# Patient Record
Sex: MALE | Race: WHITE | NOT HISPANIC OR LATINO | Employment: OTHER | URBAN - METROPOLITAN AREA
[De-identification: names, ages, dates, MRNs, and addresses within clinical notes are randomized per-mention and may not be internally consistent; named-entity substitution may affect disease eponyms.]

---

## 2023-06-26 ENCOUNTER — OFFICE VISIT (OUTPATIENT)
Dept: PRIMARY CARE | Facility: CLINIC | Age: 73
End: 2023-06-26
Payer: MEDICARE

## 2023-06-26 VITALS
HEIGHT: 77 IN | OXYGEN SATURATION: 98 % | BODY MASS INDEX: 29.4 KG/M2 | SYSTOLIC BLOOD PRESSURE: 129 MMHG | DIASTOLIC BLOOD PRESSURE: 76 MMHG | WEIGHT: 249 LBS | HEART RATE: 73 BPM | RESPIRATION RATE: 16 BRPM

## 2023-06-26 DIAGNOSIS — E78.49 OTHER HYPERLIPIDEMIA: Chronic | ICD-10-CM

## 2023-06-26 DIAGNOSIS — Z00.00 MEDICARE ANNUAL WELLNESS VISIT, SUBSEQUENT: Primary | ICD-10-CM

## 2023-06-26 DIAGNOSIS — R97.20 PSA ELEVATION: ICD-10-CM

## 2023-06-26 DIAGNOSIS — E03.9 ACQUIRED HYPOTHYROIDISM: Chronic | ICD-10-CM

## 2023-06-26 DIAGNOSIS — E11.9 CONTROLLED TYPE 2 DIABETES MELLITUS WITHOUT COMPLICATION, WITHOUT LONG-TERM CURRENT USE OF INSULIN (MULTI): Chronic | ICD-10-CM

## 2023-06-26 DIAGNOSIS — Z23 NEED FOR PROPHYLACTIC VACCINATION AGAINST STREPTOCOCCUS PNEUMONIAE (PNEUMOCOCCUS): ICD-10-CM

## 2023-06-26 DIAGNOSIS — I48.0 AF (PAROXYSMAL ATRIAL FIBRILLATION) (MULTI): Chronic | ICD-10-CM

## 2023-06-26 DIAGNOSIS — J30.2 SEASONAL ALLERGIES: ICD-10-CM

## 2023-06-26 PROBLEM — E78.5 HYPERLIPIDEMIA: Status: ACTIVE | Noted: 2023-06-26

## 2023-06-26 PROBLEM — E78.5 HYPERLIPIDEMIA: Chronic | Status: ACTIVE | Noted: 2023-06-26

## 2023-06-26 LAB
ALANINE AMINOTRANSFERASE (SGPT) (U/L) IN SER/PLAS: 17 U/L (ref 10–52)
ALBUMIN (G/DL) IN SER/PLAS: 4 G/DL (ref 3.4–5)
ALBUMIN (MG/L) IN URINE: 12.8 MG/L
ALBUMIN/CREATININE (UG/MG) IN URINE: 14.4 UG/MG CRT (ref 0–30)
ALKALINE PHOSPHATASE (U/L) IN SER/PLAS: 87 U/L (ref 33–136)
ANION GAP IN SER/PLAS: 11 MMOL/L (ref 10–20)
ANION GAP IN SER/PLAS: 9 MMOL/L (ref 10–20)
ASPARTATE AMINOTRANSFERASE (SGOT) (U/L) IN SER/PLAS: 14 U/L (ref 9–39)
BILIRUBIN TOTAL (MG/DL) IN SER/PLAS: 0.8 MG/DL (ref 0–1.2)
CALCIUM (MG/DL) IN SER/PLAS: 8.7 MG/DL (ref 8.6–10.3)
CALCIUM (MG/DL) IN SER/PLAS: 8.8 MG/DL (ref 8.6–10.3)
CARBON DIOXIDE, TOTAL (MMOL/L) IN SER/PLAS: 27 MMOL/L (ref 21–32)
CARBON DIOXIDE, TOTAL (MMOL/L) IN SER/PLAS: 28 MMOL/L (ref 21–32)
CHLORIDE (MMOL/L) IN SER/PLAS: 107 MMOL/L (ref 98–107)
CHLORIDE (MMOL/L) IN SER/PLAS: 107 MMOL/L (ref 98–107)
CHOLESTEROL (MG/DL) IN SER/PLAS: 115 MG/DL (ref 0–199)
CHOLESTEROL (MG/DL) IN SER/PLAS: 116 MG/DL (ref 0–199)
CHOLESTEROL IN HDL (MG/DL) IN SER/PLAS: 32.3 MG/DL
CHOLESTEROL IN HDL (MG/DL) IN SER/PLAS: 32.8 MG/DL
CHOLESTEROL/HDL RATIO: 3.5
CHOLESTEROL/HDL RATIO: 3.6
CREATININE (MG/DL) IN SER/PLAS: 1.01 MG/DL (ref 0.5–1.3)
CREATININE (MG/DL) IN SER/PLAS: 1.06 MG/DL (ref 0.5–1.3)
CREATININE (MG/DL) IN URINE: 88.7 MG/DL (ref 20–370)
ESTIMATED AVERAGE GLUCOSE FOR HBA1C: 189 MG/DL
ESTIMATED AVERAGE GLUCOSE FOR HBA1C: 192 MG/DL
GFR MALE: 73 ML/MIN/1.73M2
GFR MALE: 77 ML/MIN/1.73M2
GLUCOSE (MG/DL) IN SER/PLAS: 139 MG/DL (ref 74–99)
GLUCOSE (MG/DL) IN SER/PLAS: 140 MG/DL (ref 74–99)
HEMOGLOBIN A1C/HEMOGLOBIN TOTAL IN BLOOD: 8.2 %
HEMOGLOBIN A1C/HEMOGLOBIN TOTAL IN BLOOD: 8.3 %
LDL: 42 MG/DL (ref 0–99)
LDL: 43 MG/DL (ref 0–99)
NON HDL CHOLESTEROL: 83 MG/DL
NON HDL CHOLESTEROL: 83 MG/DL
POTASSIUM (MMOL/L) IN SER/PLAS: 4 MMOL/L (ref 3.5–5.3)
POTASSIUM (MMOL/L) IN SER/PLAS: 4 MMOL/L (ref 3.5–5.3)
PROTEIN TOTAL: 6.4 G/DL (ref 6.4–8.2)
SODIUM (MMOL/L) IN SER/PLAS: 140 MMOL/L (ref 136–145)
SODIUM (MMOL/L) IN SER/PLAS: 141 MMOL/L (ref 136–145)
THYROTROPIN (MIU/L) IN SER/PLAS BY DETECTION LIMIT <= 0.05 MIU/L: 1.8 MIU/L (ref 0.44–3.98)
TRIGLYCERIDE (MG/DL) IN SER/PLAS: 203 MG/DL (ref 0–149)
TRIGLYCERIDE (MG/DL) IN SER/PLAS: 204 MG/DL (ref 0–149)
UREA NITROGEN (MG/DL) IN SER/PLAS: 13 MG/DL (ref 6–23)
UREA NITROGEN (MG/DL) IN SER/PLAS: 14 MG/DL (ref 6–23)
VLDL: 41 MG/DL (ref 0–40)
VLDL: 41 MG/DL (ref 0–40)

## 2023-06-26 PROCEDURE — 1160F RVW MEDS BY RX/DR IN RCRD: CPT | Performed by: STUDENT IN AN ORGANIZED HEALTH CARE EDUCATION/TRAINING PROGRAM

## 2023-06-26 PROCEDURE — 3074F SYST BP LT 130 MM HG: CPT | Performed by: STUDENT IN AN ORGANIZED HEALTH CARE EDUCATION/TRAINING PROGRAM

## 2023-06-26 PROCEDURE — G0439 PPPS, SUBSEQ VISIT: HCPCS | Performed by: STUDENT IN AN ORGANIZED HEALTH CARE EDUCATION/TRAINING PROGRAM

## 2023-06-26 PROCEDURE — 1170F FXNL STATUS ASSESSED: CPT | Performed by: STUDENT IN AN ORGANIZED HEALTH CARE EDUCATION/TRAINING PROGRAM

## 2023-06-26 PROCEDURE — G0009 ADMIN PNEUMOCOCCAL VACCINE: HCPCS | Performed by: STUDENT IN AN ORGANIZED HEALTH CARE EDUCATION/TRAINING PROGRAM

## 2023-06-26 PROCEDURE — 1036F TOBACCO NON-USER: CPT | Performed by: STUDENT IN AN ORGANIZED HEALTH CARE EDUCATION/TRAINING PROGRAM

## 2023-06-26 PROCEDURE — 1159F MED LIST DOCD IN RCRD: CPT | Performed by: STUDENT IN AN ORGANIZED HEALTH CARE EDUCATION/TRAINING PROGRAM

## 2023-06-26 PROCEDURE — 3078F DIAST BP <80 MM HG: CPT | Performed by: STUDENT IN AN ORGANIZED HEALTH CARE EDUCATION/TRAINING PROGRAM

## 2023-06-26 PROCEDURE — 99214 OFFICE O/P EST MOD 30 MIN: CPT | Performed by: STUDENT IN AN ORGANIZED HEALTH CARE EDUCATION/TRAINING PROGRAM

## 2023-06-26 PROCEDURE — 90677 PCV20 VACCINE IM: CPT | Performed by: STUDENT IN AN ORGANIZED HEALTH CARE EDUCATION/TRAINING PROGRAM

## 2023-06-26 PROCEDURE — 99397 PER PM REEVAL EST PAT 65+ YR: CPT | Performed by: STUDENT IN AN ORGANIZED HEALTH CARE EDUCATION/TRAINING PROGRAM

## 2023-06-26 PROCEDURE — 4010F ACE/ARB THERAPY RXD/TAKEN: CPT | Performed by: STUDENT IN AN ORGANIZED HEALTH CARE EDUCATION/TRAINING PROGRAM

## 2023-06-26 RX ORDER — EMPAGLIFLOZIN 10 MG/1
10 TABLET, FILM COATED ORAL DAILY
COMMUNITY
End: 2023-12-04 | Stop reason: ALTCHOICE

## 2023-06-26 RX ORDER — SOTALOL HYDROCHLORIDE 120 MG/1
TABLET ORAL 2 TIMES DAILY
COMMUNITY
End: 2023-08-11 | Stop reason: SDUPTHER

## 2023-06-26 RX ORDER — ATORVASTATIN CALCIUM 40 MG/1
40 TABLET, FILM COATED ORAL NIGHTLY
COMMUNITY
End: 2024-03-08

## 2023-06-26 RX ORDER — INSULIN GLARGINE 100 [IU]/ML
INJECTION, SOLUTION SUBCUTANEOUS
COMMUNITY
End: 2024-02-19

## 2023-06-26 RX ORDER — GLIPIZIDE 10 MG/1
10 TABLET ORAL 2 TIMES DAILY
COMMUNITY
End: 2024-02-19

## 2023-06-26 RX ORDER — METFORMIN HYDROCHLORIDE 750 MG/1
750 TABLET, EXTENDED RELEASE ORAL 2 TIMES DAILY
COMMUNITY
End: 2023-11-29

## 2023-06-26 RX ORDER — LISINOPRIL 2.5 MG/1
2.5 TABLET ORAL DAILY
COMMUNITY
Start: 2023-04-21 | End: 2023-10-25 | Stop reason: SDUPTHER

## 2023-06-26 RX ORDER — LEVOTHYROXINE SODIUM 150 UG/1
150 TABLET ORAL DAILY
COMMUNITY
End: 2024-05-24

## 2023-06-26 RX ORDER — PIOGLITAZONEHYDROCHLORIDE 45 MG/1
45 TABLET ORAL DAILY
COMMUNITY
End: 2023-11-28 | Stop reason: SDUPTHER

## 2023-06-26 ASSESSMENT — ENCOUNTER SYMPTOMS
FATIGUE: 0
UNEXPECTED WEIGHT CHANGE: 0
OCCASIONAL FEELINGS OF UNSTEADINESS: 0
APPETITE CHANGE: 0
SHORTNESS OF BREATH: 0
COUGH: 0
DYSPHORIC MOOD: 0
PALPITATIONS: 0
LOSS OF SENSATION IN FEET: 0
DIZZINESS: 0
CONFUSION: 1
DEPRESSION: 0
ACTIVITY CHANGE: 0
LIGHT-HEADEDNESS: 0
ARTHRALGIAS: 1
DYSURIA: 0
WHEEZING: 0

## 2023-06-26 ASSESSMENT — ACTIVITIES OF DAILY LIVING (ADL)
MANAGING_FINANCES: INDEPENDENT
BATHING: INDEPENDENT
DOING_HOUSEWORK: INDEPENDENT
GROCERY_SHOPPING: INDEPENDENT
DRESSING: INDEPENDENT
TAKING_MEDICATION: INDEPENDENT

## 2023-06-26 ASSESSMENT — PATIENT HEALTH QUESTIONNAIRE - PHQ9
2. FEELING DOWN, DEPRESSED OR HOPELESS: NOT AT ALL
1. LITTLE INTEREST OR PLEASURE IN DOING THINGS: NOT AT ALL
SUM OF ALL RESPONSES TO PHQ9 QUESTIONS 1 AND 2: 0

## 2023-06-26 NOTE — ASSESSMENT & PLAN NOTE
PNEUMONIA vaccine- Ordered prevnar 20  SHINGLES vaccine- Believes completed will look up results  Screening tests:  Colon cancer screening--> Up to date  Prostate Cancer Screening--> Ordered  During the course of the visit the patient was educated and counseled about age appropriate screening and preventive services. Completed preventive screenings were documented in the chart and orders were placed for outstanding screenings/procedures as documented in the Assessment and Plan.  Patient Instructions (the written plan) was given to the patient at check out.

## 2023-06-26 NOTE — ASSESSMENT & PLAN NOTE
Est with endocrinology   On ACE  On Statin  GLipizide 10mg, jardiance 10mg, Lantus, metformin 750mg BID, Actos  Eye exam ordered  Will update labs

## 2023-06-26 NOTE — ASSESSMENT & PLAN NOTE
Dr. Marsh 2022-recommendations ILR, could not get insurance coverage  On sotalol  Negative Echocardiogram  Not on anticoagulation

## 2023-06-26 NOTE — PATIENT INSTRUCTIONS

## 2023-06-26 NOTE — PROGRESS NOTES
"Subjective   Reason for Visit: Juan Varela is an 73 y.o. male here for a Medicare Wellness visit.     Past Medical, Surgical, and Family History reviewed and updated in chart.    Reviewed all medications by prescribing practitioner or clinical pharmacist (such as prescriptions, OTCs, herbal therapies and supplements) and documented in the medical record.    HPI    74 yo male presents to [facility] care     Has been having some dizziness in the last year with some falls  Went to cardiology and endo for workup  Echo normal  Holter normal  Carotid US normal   Started on lisinopril with improvement in symptoms.    Lives with son and grandson    No real concerns today     Patient Care Team:  Roseanna Durbin DO as PCP - General (Family Medicine)  ABBY Anders as PCP - [employer] PCP     Review of Systems   Constitutional:  Negative for activity change, appetite change, fatigue and unexpected weight change.   Eyes:  Negative for visual disturbance.   Respiratory:  Negative for cough, shortness of breath and wheezing.    Cardiovascular:  Negative for chest pain, palpitations and leg swelling.   Genitourinary:  Negative for dysuria.   Musculoskeletal:  Positive for arthralgias.   Allergic/Immunologic: Negative for immunocompromised state.   Neurological:  Negative for dizziness and light-headedness.   Psychiatric/Behavioral:  Positive for confusion. Negative for dysphoric mood.      Objective   Vitals:  /76   Pulse 73   Resp 16   Ht 1.956 m (6' 5\")   Wt 113 kg (249 lb)   SpO2 98%   BMI 29.53 kg/m²       Physical Exam  Constitutional:       General: He is not in acute distress.     Appearance: Normal appearance. He is not ill-appearing.   HENT:      Head: Normocephalic and atraumatic.      Right Ear: Hearing normal.      Left Ear: Hearing normal.      Mouth/Throat:      Pharynx: No oropharyngeal exudate or posterior oropharyngeal erythema.   Eyes:      Extraocular Movements: Extraocular " movements intact.   Cardiovascular:      Rate and Rhythm: Normal rate and regular rhythm.   Pulmonary:      Effort: Pulmonary effort is normal. No respiratory distress.   Abdominal:      Tenderness: There is no abdominal tenderness.   Musculoskeletal:      Cervical back: No tenderness.      Right lower leg: No edema.      Left lower leg: No edema.   Skin:     General: Skin is warm and dry.   Neurological:      General: No focal deficit present.      Mental Status: He is alert and oriented to person, place, and time.   Psychiatric:         Mood and Affect: Mood normal.         Behavior: Behavior normal.     Assessment/Plan   Problem List Items Addressed This Visit       AF (paroxysmal atrial fibrillation) (CMS/[facility]) (Chronic)    Current Assessment & Plan     Dr. Marsh 2022-recommendations ILR, could not get insurance coverage  On sotalol  Negative Echocardiogram  Not on anticoagulation         Relevant Medications    sotalol ([place]) 120 mg tablet    Diabetes type 2, controlled (CMS/[facility]) (Chronic)    Current Assessment & Plan     Est with endocrinology   On ACE  On Statin  GLipizide 10mg, jardiance 10mg, Lantus, metformin 750mg BID, Actos  Eye exam ordered  Will update labs         Relevant Orders    Hemoglobin A1C    Lipid Panel    Albumin , Urine Random    Comprehensive Metabolic Panel    Referral to Ophthalmology    Hyperlipidemia (Chronic)    Relevant Orders    Lipid Panel    Hypothyroidism (Chronic)    Current Assessment & Plan     Continue levothyroxine, labs up to date          Relevant Orders    Lipid Panel    PSA elevation    Relevant Orders    PSA, total and free    Medicare annual wellness visit, subsequent - Primary    Current Assessment & Plan     PNEUMONIA vaccine- Ordered prevnar 20  SHINGLES vaccine- Believes completed will look up results  Screening tests:  Colon cancer screening--> Up to date  Prostate Cancer Screening--> Ordered  During the course of the visit the patient was educated and  counseled about age appropriate screening and preventive services. Completed preventive screenings were documented in the chart and orders were placed for outstanding screenings/procedures as documented in the Assessment and Plan.  Patient Instructions (the written plan) was given to the patient at check out.           Relevant Orders    Follow Up In [facility] - PCP    Seasonal allergies    Current Assessment & Plan     OTC medications, avoidance of triggers          Other Visit Diagnoses       Need for prophylactic vaccination against Streptococcus pneumoniae (pneumococcus)        Relevant Orders    Pneumococcal conjugate vaccine, 20-valent, adult (PREVNAR 20) (Completed)             Patient was identified as a fall risk. Risk prevention instructions provided.

## 2023-08-11 DIAGNOSIS — E78.49 OTHER HYPERLIPIDEMIA: Primary | Chronic | ICD-10-CM

## 2023-08-11 RX ORDER — SOTALOL HYDROCHLORIDE 120 MG/1
120 TABLET ORAL 2 TIMES DAILY
Qty: 60 TABLET | Refills: 1 | Status: SHIPPED | OUTPATIENT
Start: 2023-08-11 | End: 2023-12-04 | Stop reason: SDUPTHER

## 2023-09-05 DIAGNOSIS — E78.49 OTHER HYPERLIPIDEMIA: Chronic | ICD-10-CM

## 2023-09-06 RX ORDER — SOTALOL HYDROCHLORIDE 120 MG/1
120 TABLET ORAL 2 TIMES DAILY
Qty: 60 TABLET | Refills: 1 | OUTPATIENT
Start: 2023-09-06

## 2023-10-25 DIAGNOSIS — R80.9 MICROALBUMINURIA: Primary | ICD-10-CM

## 2023-10-27 RX ORDER — LISINOPRIL 2.5 MG/1
2.5 TABLET ORAL DAILY
Qty: 90 TABLET | Refills: 1 | Status: SHIPPED | OUTPATIENT
Start: 2023-10-27 | End: 2024-04-26

## 2023-10-27 NOTE — TELEPHONE ENCOUNTER
Patient called to follow up on the status of this request. Patient states he has been out of medication since 10/25/23 and his pharmacy has made several attempts to request refills.    Patient has been advised to called office directly as we do not fill request from pharmacy to avoid error.

## 2023-11-28 DIAGNOSIS — E11.9 CONTROLLED TYPE 2 DIABETES MELLITUS WITHOUT COMPLICATION, WITHOUT LONG-TERM CURRENT USE OF INSULIN (MULTI): Primary | Chronic | ICD-10-CM

## 2023-11-28 RX ORDER — PIOGLITAZONEHYDROCHLORIDE 45 MG/1
45 TABLET ORAL DAILY
Qty: 90 TABLET | Refills: 1 | Status: SHIPPED | OUTPATIENT
Start: 2023-11-28 | End: 2024-05-24

## 2023-11-29 DIAGNOSIS — E11.9 TYPE 2 DIABETES MELLITUS WITHOUT COMPLICATIONS (MULTI): ICD-10-CM

## 2023-11-29 RX ORDER — METFORMIN HYDROCHLORIDE 750 MG/1
750 TABLET, EXTENDED RELEASE ORAL 2 TIMES DAILY
Qty: 180 TABLET | Refills: 1 | Status: SHIPPED | OUTPATIENT
Start: 2023-11-29 | End: 2024-06-03

## 2023-12-04 ENCOUNTER — OFFICE VISIT (OUTPATIENT)
Dept: ENDOCRINOLOGY | Facility: CLINIC | Age: 73
End: 2023-12-04
Payer: MEDICARE

## 2023-12-04 VITALS
HEIGHT: 77 IN | HEART RATE: 80 BPM | WEIGHT: 244 LBS | DIASTOLIC BLOOD PRESSURE: 84 MMHG | SYSTOLIC BLOOD PRESSURE: 130 MMHG | BODY MASS INDEX: 28.81 KG/M2

## 2023-12-04 DIAGNOSIS — E11.9 TYPE 2 DIABETES MELLITUS WITHOUT COMPLICATION, WITHOUT LONG-TERM CURRENT USE OF INSULIN (MULTI): ICD-10-CM

## 2023-12-04 DIAGNOSIS — E03.9 ACQUIRED HYPOTHYROIDISM: Chronic | ICD-10-CM

## 2023-12-04 DIAGNOSIS — E11.9 TYPE 2 DIABETES MELLITUS WITHOUT COMPLICATION, UNSPECIFIED WHETHER LONG TERM INSULIN USE (MULTI): Primary | ICD-10-CM

## 2023-12-04 DIAGNOSIS — E78.49 OTHER HYPERLIPIDEMIA: Chronic | ICD-10-CM

## 2023-12-04 LAB
POC FINGERSTICK BLOOD GLUCOSE: 194 MG/DL (ref 70–100)
POC HEMOGLOBIN A1C: 8.2 % (ref 4.2–6.5)

## 2023-12-04 PROCEDURE — 3052F HG A1C>EQUAL 8.0%<EQUAL 9.0%: CPT | Performed by: INTERNAL MEDICINE

## 2023-12-04 PROCEDURE — 99214 OFFICE O/P EST MOD 30 MIN: CPT | Performed by: INTERNAL MEDICINE

## 2023-12-04 PROCEDURE — 1160F RVW MEDS BY RX/DR IN RCRD: CPT | Performed by: INTERNAL MEDICINE

## 2023-12-04 PROCEDURE — 83036 HEMOGLOBIN GLYCOSYLATED A1C: CPT | Performed by: INTERNAL MEDICINE

## 2023-12-04 PROCEDURE — 4010F ACE/ARB THERAPY RXD/TAKEN: CPT | Performed by: INTERNAL MEDICINE

## 2023-12-04 PROCEDURE — 3079F DIAST BP 80-89 MM HG: CPT | Performed by: INTERNAL MEDICINE

## 2023-12-04 PROCEDURE — 3075F SYST BP GE 130 - 139MM HG: CPT | Performed by: INTERNAL MEDICINE

## 2023-12-04 PROCEDURE — 1159F MED LIST DOCD IN RCRD: CPT | Performed by: INTERNAL MEDICINE

## 2023-12-04 PROCEDURE — 82962 GLUCOSE BLOOD TEST: CPT | Performed by: INTERNAL MEDICINE

## 2023-12-04 PROCEDURE — 1036F TOBACCO NON-USER: CPT | Performed by: INTERNAL MEDICINE

## 2023-12-04 RX ORDER — SOTALOL HYDROCHLORIDE 120 MG/1
60 TABLET ORAL 2 TIMES DAILY
Qty: 90 TABLET | Refills: 0 | Status: SHIPPED | OUTPATIENT
Start: 2023-12-04 | End: 2024-03-06

## 2023-12-04 NOTE — TELEPHONE ENCOUNTER
----- Message from Karly Riddle sent at 12/4/2023 10:13 AM EST -----  Needs a refill of sotalol, 120 mg tablets. He's looking for another year of refills. Please send to [facility] in [place].

## 2023-12-04 NOTE — PROGRESS NOTES
"Subjective   Juan Varela is a 73 y.o. male who presents for follow-up of Type 2 diabetes mellitus.     He has not had any major changes to his health since his last appointment.  He gets blurred vision occasionally.      Known complications due to diabetes included     Cardiovascular risk factors include advanced age (older than 55 for men, 65 for women), diabetes mellitus, male gender, and microalbuminuria. The patient is on an ACE inhibitor or angiotensin II receptor blocker.  The patient has not been previously hospitalized due to diabetic ketoacidosis.     Current symptoms/problems include none. His clinical course has been stable.     The patient is not currently checking the blood glucose.      Hypoglycemia frequency: Denies symptoms of hypoglycemia      Current Medication Regimen  Lantus 50 units SQ bedtime   Glipizide 10mg twice daily   Pioglitazone 45mg once daily   Metformin 750mg twice daily   Jardiance 10mg once daily   Atorvastatin 40mg once daily   Lisinopril 2.5 mg once daily     MEALS:   Brunch - breakfast items   Dinner - frozen dinner  Snacks - too many sweets, ice cream   Beverages - diet beverages, water, diet Snapple peach iced tea, apricot juice a few ounces,      Denies exercise regimen     Review of Systems   Constitutional:  Positive for fatigue. Diaphoresis: With exertion.  Genitourinary:         Nocturia x 1       Objective   /84 (BP Location: Left arm, Patient Position: Sitting, BP Cuff Size: Adult)   Pulse 80   Ht 1.956 m (6' 5\")   Wt 111 kg (244 lb)   BMI 28.93 kg/m²   Physical Exam  Vitals and nursing note reviewed.   Constitutional:       General: He is not in acute distress.     Appearance: Normal appearance. He is normal weight.   HENT:      Head: Normocephalic and atraumatic.      Nose: Nose normal.      Mouth/Throat:      Mouth: Mucous membranes are moist.   Eyes:      Extraocular Movements: Extraocular movements intact.   Cardiovascular:      Rate and Rhythm: " Normal rate and regular rhythm.   Pulmonary:      Effort: Pulmonary effort is normal.      Breath sounds: Normal breath sounds.   Musculoskeletal:         General: Normal range of motion.   Skin:     General: Skin is warm.   Neurological:      Mental Status: He is alert and oriented to person, place, and time.   Psychiatric:         Mood and Affect: Mood normal.       Lab Review  Glucose (mg/dL)   Date Value   06/26/2023 139 (H)   06/26/2023 140 (H)   01/09/2023 144 (H)     Hemoglobin A1C (%)   Date Value   06/26/2023 8.3 (A)   06/26/2023 8.2 (A)   08/09/2022 8.2 (A)     Bicarbonate (mmol/L)   Date Value   06/26/2023 27   06/26/2023 28   01/09/2023 28     Urea Nitrogen (mg/dL)   Date Value   06/26/2023 13   06/26/2023 14   01/09/2023 15     Creatinine (mg/dL)   Date Value   06/26/2023 1.01   06/26/2023 1.06   01/09/2023 0.97       Health Maintenance:   Foot Exam:  June 6, 2023  Eye Exam:  2022  Lipid Panel:  June 26, 2023  Urine Albumin: June 26, 2023    Assessment/Plan     73-year-old male presents for follow up for type 2 diabetes mellitus. His hemoglobin A1c was found to be 8.2% as of today. He does not check his blood sugars as insurance was not paying for testing supplies historically. His blood pressure is at goal. He is noted to be on a statin. He is noted to be on an ACE inhibitor as he was found to have microalbuminuria.     Type 2 diabetes mellitus (CMS/[facility])  To continue Glipizide 10mg twice daily   To increase Jardiance to 25mg once daily   To continue Metformin 750mg twie daily   To continue pioglitazone 45mg once daily   To continue Lantus 50 units SQ bedtime   Counseled that the goal A1C should be 7% or less  Counseled glycemic control is warranted to prevent microvascular complications      Hypothyroidism  To continue Levothyroxine 150mcg po daily   Take levothyroxine on an empty stomach with water alone, 30-60 minutes before eating or taking other medications, 4 hours before any calcium or iron  supplement.      Long-term insulin use (CMS/HCC)  Please rotate insulin injection sites    For follow up in 6 months

## 2023-12-04 NOTE — PATIENT INSTRUCTIONS
Thank you for choosing [facility]  for your health care needs.  If you have any questions, concerns or medical needs, please feel free to contact our office at [phone].    Please ensure you complete your blood work one week before the next scheduled appointment.    To continue Glipizide 10mg twice daily   To increase Jardiance to 25mg once daily   To continue Metformin 750mg twie daily   To continue pioglitazone 45mg once daily   To continue Lantus 50 units SQ bedtime   To continue Levothyroxine 150mcg po daily   Take levothyroxine on an empty stomach with water alone, 30-60 minutes before eating or taking other medications, 4 hours before any calcium or iron supplement.  For follow up in 6 months

## 2023-12-04 NOTE — TELEPHONE ENCOUNTER
Last appointment: 23 with Dr. Reynolds  Next appointment: 24 with Dr. Reynolds  Labs labs:   Hasn't seen APRN    Sotolol labs:     BMP: 23  CBC: 22  AST/ALT: 23  EK23    Ok to order EKG in office and above labs to be drawn since due every 6 months?    I called and spoke with patient to confirm how he is taking the medication bc last appt with Dr. Reynolds it showed 0.5 tab BID, but PCP office was refilling at 1 tab BID. He said he has been taking Sotalol 120 mg 1 tab PO once daily for years.

## 2023-12-06 PROBLEM — Z79.4 LONG-TERM INSULIN USE (MULTI): Status: ACTIVE | Noted: 2023-12-06

## 2023-12-06 ASSESSMENT — ENCOUNTER SYMPTOMS: FATIGUE: 1

## 2023-12-07 NOTE — ASSESSMENT & PLAN NOTE
To continue Glipizide 10mg twice daily   To increase Jardiance to 25mg once daily   To continue Metformin 750mg twie daily   To continue pioglitazone 45mg once daily   To continue Lantus 50 units SQ bedtime   Counseled that the goal A1C should be 7% or less  Counseled glycemic control is warranted to prevent microvascular complications

## 2023-12-07 NOTE — ASSESSMENT & PLAN NOTE
To continue Levothyroxine 150mcg po daily   Take levothyroxine on an empty stomach with water alone, 30-60 minutes before eating or taking other medications, 4 hours before any calcium or iron supplement.

## 2024-02-18 DIAGNOSIS — E11.9 TYPE 2 DIABETES MELLITUS WITHOUT COMPLICATIONS (MULTI): ICD-10-CM

## 2024-02-19 DIAGNOSIS — E11.9 TYPE 2 DIABETES MELLITUS WITHOUT COMPLICATIONS (MULTI): ICD-10-CM

## 2024-02-19 RX ORDER — GLIPIZIDE 10 MG/1
10 TABLET ORAL 2 TIMES DAILY
Qty: 180 TABLET | Refills: 2 | Status: SHIPPED | OUTPATIENT
Start: 2024-02-19

## 2024-02-19 RX ORDER — INSULIN GLARGINE 100 [IU]/ML
50 INJECTION, SOLUTION SUBCUTANEOUS NIGHTLY
Qty: 15 ML | Refills: 5 | Status: SHIPPED | OUTPATIENT
Start: 2024-02-19 | End: 2024-05-29

## 2024-02-27 DIAGNOSIS — E78.49 OTHER HYPERLIPIDEMIA: Chronic | ICD-10-CM

## 2024-03-06 RX ORDER — SOTALOL HYDROCHLORIDE 120 MG/1
TABLET ORAL
Qty: 90 TABLET | Refills: 1 | Status: SHIPPED | OUTPATIENT
Start: 2024-03-06

## 2024-03-08 DIAGNOSIS — E78.5 HYPERLIPIDEMIA, UNSPECIFIED: ICD-10-CM

## 2024-03-08 RX ORDER — ATORVASTATIN CALCIUM 40 MG/1
40 TABLET, FILM COATED ORAL NIGHTLY
Qty: 90 TABLET | Refills: 2 | Status: SHIPPED | OUTPATIENT
Start: 2024-03-08

## 2024-04-26 DIAGNOSIS — R80.9 MICROALBUMINURIA: ICD-10-CM

## 2024-04-26 RX ORDER — LISINOPRIL 2.5 MG/1
2.5 TABLET ORAL DAILY
Qty: 90 TABLET | Refills: 1 | Status: SHIPPED | OUTPATIENT
Start: 2024-04-26

## 2024-05-24 DIAGNOSIS — E03.9 HYPOTHYROIDISM, UNSPECIFIED: ICD-10-CM

## 2024-05-24 DIAGNOSIS — E11.9 CONTROLLED TYPE 2 DIABETES MELLITUS WITHOUT COMPLICATION, WITHOUT LONG-TERM CURRENT USE OF INSULIN (MULTI): Chronic | ICD-10-CM

## 2024-05-24 RX ORDER — PIOGLITAZONEHYDROCHLORIDE 45 MG/1
45 TABLET ORAL DAILY
Qty: 90 TABLET | Refills: 1 | Status: SHIPPED | OUTPATIENT
Start: 2024-05-24 | End: 2024-11-20

## 2024-05-24 RX ORDER — LEVOTHYROXINE SODIUM 150 UG/1
150 TABLET ORAL DAILY
Qty: 90 TABLET | Refills: 1 | Status: SHIPPED | OUTPATIENT
Start: 2024-05-24

## 2024-05-29 DIAGNOSIS — E11.9 TYPE 2 DIABETES MELLITUS WITHOUT COMPLICATIONS (MULTI): ICD-10-CM

## 2024-05-29 RX ORDER — INSULIN GLARGINE 100 [IU]/ML
50 INJECTION, SOLUTION SUBCUTANEOUS NIGHTLY
Qty: 15 ML | Refills: 5 | Status: SHIPPED | OUTPATIENT
Start: 2024-05-29 | End: 2024-06-04 | Stop reason: SDUPTHER

## 2024-05-31 ENCOUNTER — LAB (OUTPATIENT)
Dept: LAB | Facility: LAB | Age: 74
End: 2024-05-31
Payer: MEDICARE

## 2024-05-31 DIAGNOSIS — E03.9 ACQUIRED HYPOTHYROIDISM: Chronic | ICD-10-CM

## 2024-05-31 DIAGNOSIS — R97.20 PSA ELEVATION: ICD-10-CM

## 2024-05-31 DIAGNOSIS — E11.9 CONTROLLED TYPE 2 DIABETES MELLITUS WITHOUT COMPLICATION, WITHOUT LONG-TERM CURRENT USE OF INSULIN (MULTI): Chronic | ICD-10-CM

## 2024-05-31 DIAGNOSIS — E78.49 OTHER HYPERLIPIDEMIA: Chronic | ICD-10-CM

## 2024-05-31 LAB
ALBUMIN SERPL BCP-MCNC: 4.1 G/DL (ref 3.4–5)
ALP SERPL-CCNC: 83 U/L (ref 33–136)
ALT SERPL W P-5'-P-CCNC: 16 U/L (ref 10–52)
ANION GAP SERPL CALC-SCNC: 12 MMOL/L (ref 10–20)
AST SERPL W P-5'-P-CCNC: 13 U/L (ref 9–39)
BILIRUB SERPL-MCNC: 0.7 MG/DL (ref 0–1.2)
BUN SERPL-MCNC: 16 MG/DL (ref 6–23)
CALCIUM SERPL-MCNC: 8.7 MG/DL (ref 8.6–10.3)
CHLORIDE SERPL-SCNC: 105 MMOL/L (ref 98–107)
CHOLEST SERPL-MCNC: 111 MG/DL (ref 0–199)
CHOLESTEROL/HDL RATIO: 3.1
CO2 SERPL-SCNC: 28 MMOL/L (ref 21–32)
CREAT SERPL-MCNC: 1.02 MG/DL (ref 0.5–1.3)
EGFRCR SERPLBLD CKD-EPI 2021: 76 ML/MIN/1.73M*2
EST. AVERAGE GLUCOSE BLD GHB EST-MCNC: 180 MG/DL
GLUCOSE SERPL-MCNC: 164 MG/DL (ref 74–99)
HBA1C MFR BLD: 7.9 %
HDLC SERPL-MCNC: 36.1 MG/DL
LDLC SERPL CALC-MCNC: 48 MG/DL
NON HDL CHOLESTEROL: 75 MG/DL (ref 0–149)
POTASSIUM SERPL-SCNC: 4 MMOL/L (ref 3.5–5.3)
PROT SERPL-MCNC: 6.4 G/DL (ref 6.4–8.2)
SODIUM SERPL-SCNC: 141 MMOL/L (ref 136–145)
T4 SERPL-MCNC: 8.6 UG/DL (ref 4.5–11.1)
TRIGL SERPL-MCNC: 137 MG/DL (ref 0–149)
TSH SERPL-ACNC: 3.69 MIU/L (ref 0.44–3.98)
VLDL: 27 MG/DL (ref 0–40)

## 2024-05-31 PROCEDURE — 84443 ASSAY THYROID STIM HORMONE: CPT

## 2024-05-31 PROCEDURE — 80053 COMPREHEN METABOLIC PANEL: CPT

## 2024-05-31 PROCEDURE — 83036 HEMOGLOBIN GLYCOSYLATED A1C: CPT

## 2024-05-31 PROCEDURE — 84436 ASSAY OF TOTAL THYROXINE: CPT

## 2024-05-31 PROCEDURE — 36415 COLL VENOUS BLD VENIPUNCTURE: CPT

## 2024-05-31 PROCEDURE — 84153 ASSAY OF PSA TOTAL: CPT

## 2024-05-31 PROCEDURE — 84154 ASSAY OF PSA FREE: CPT

## 2024-05-31 PROCEDURE — 80061 LIPID PANEL: CPT

## 2024-06-02 DIAGNOSIS — E11.9 TYPE 2 DIABETES MELLITUS WITHOUT COMPLICATIONS (MULTI): ICD-10-CM

## 2024-06-02 LAB
PSA FREE MFR SERPL: 23 %
PSA FREE SERPL-MCNC: 1.4 NG/ML
PSA SERPL IA-MCNC: 6.1 NG/ML (ref 0–4)

## 2024-06-03 ENCOUNTER — TELEPHONE (OUTPATIENT)
Dept: PRIMARY CARE | Facility: CLINIC | Age: 74
End: 2024-06-03
Payer: MEDICARE

## 2024-06-03 RX ORDER — METFORMIN HYDROCHLORIDE 750 MG/1
750 TABLET, EXTENDED RELEASE ORAL 2 TIMES DAILY
Qty: 180 TABLET | Refills: 1 | Status: SHIPPED | OUTPATIENT
Start: 2024-06-03

## 2024-06-03 NOTE — TELEPHONE ENCOUNTER
----- Message from Roseanna Durbin DO sent at 6/3/2024  7:45 AM EDT -----  PSA is continuing to rise, referral is placed for urology.   A1c is starting to improve we went from 8.3 to 7.9

## 2024-06-04 ENCOUNTER — OFFICE VISIT (OUTPATIENT)
Dept: ENDOCRINOLOGY | Facility: CLINIC | Age: 74
End: 2024-06-04
Payer: MEDICARE

## 2024-06-04 VITALS
HEIGHT: 77 IN | WEIGHT: 238 LBS | SYSTOLIC BLOOD PRESSURE: 132 MMHG | BODY MASS INDEX: 28.1 KG/M2 | DIASTOLIC BLOOD PRESSURE: 80 MMHG | HEART RATE: 74 BPM

## 2024-06-04 DIAGNOSIS — E11.9 TYPE 2 DIABETES MELLITUS WITHOUT COMPLICATION, UNSPECIFIED WHETHER LONG TERM INSULIN USE (MULTI): ICD-10-CM

## 2024-06-04 DIAGNOSIS — E11.9 TYPE 2 DIABETES MELLITUS WITHOUT COMPLICATIONS (MULTI): ICD-10-CM

## 2024-06-04 DIAGNOSIS — E03.9 ACQUIRED HYPOTHYROIDISM: ICD-10-CM

## 2024-06-04 DIAGNOSIS — Z79.4 TYPE 2 DIABETES MELLITUS WITHOUT COMPLICATION, WITH LONG-TERM CURRENT USE OF INSULIN (MULTI): Primary | ICD-10-CM

## 2024-06-04 DIAGNOSIS — E11.9 TYPE 2 DIABETES MELLITUS WITHOUT COMPLICATION, WITH LONG-TERM CURRENT USE OF INSULIN (MULTI): Primary | ICD-10-CM

## 2024-06-04 LAB — POC FINGERSTICK BLOOD GLUCOSE: 101 MG/DL (ref 70–100)

## 2024-06-04 PROCEDURE — 1159F MED LIST DOCD IN RCRD: CPT | Performed by: INTERNAL MEDICINE

## 2024-06-04 PROCEDURE — 82962 GLUCOSE BLOOD TEST: CPT | Performed by: INTERNAL MEDICINE

## 2024-06-04 PROCEDURE — 1160F RVW MEDS BY RX/DR IN RCRD: CPT | Performed by: INTERNAL MEDICINE

## 2024-06-04 PROCEDURE — 99214 OFFICE O/P EST MOD 30 MIN: CPT | Performed by: INTERNAL MEDICINE

## 2024-06-04 RX ORDER — INSULIN GLARGINE 100 [IU]/ML
50 INJECTION, SOLUTION SUBCUTANEOUS NIGHTLY
Qty: 45 ML | Refills: 1 | Status: SHIPPED | OUTPATIENT
Start: 2024-06-04 | End: 2024-06-04

## 2024-06-04 RX ORDER — INSULIN GLARGINE 100 [IU]/ML
50 INJECTION, SOLUTION SUBCUTANEOUS NIGHTLY
Qty: 45 ML | Refills: 1 | Status: SHIPPED | OUTPATIENT
Start: 2024-06-04 | End: 2024-12-01

## 2024-06-04 RX ORDER — INSULIN GLARGINE 100 [IU]/ML
50 INJECTION, SOLUTION SUBCUTANEOUS NIGHTLY
Qty: 45 ML | Refills: 1 | Status: SHIPPED | OUTPATIENT
Start: 2024-06-04 | End: 2024-06-04 | Stop reason: SDUPTHER

## 2024-06-04 ASSESSMENT — ENCOUNTER SYMPTOMS
SHORTNESS OF BREATH: 0
BACK PAIN: 1

## 2024-06-04 NOTE — PROGRESS NOTES
"Subjective   Juan Varela is a 74 y.o. male who presents for follow-up of Type 2 diabetes mellitus.     He has not had any major changes to his health since his last appointment.  His dizziness has subsided.      Known complications due to diabetes included none.      Cardiovascular risk factors include advanced age (older than 55 for men, 65 for women), diabetes mellitus, male gender, and microalbuminuria. The patient is on an ACE inhibitor or angiotensin II receptor blocker.  The patient has not been previously hospitalized due to diabetic ketoacidosis.     Current symptoms/problems include none. His clinical course has been stable.     The patient is not currently checking the blood glucose.      Hypoglycemia frequency: Denies  Symptoms of hypoglycemia: N/A      Current Medication Regimen  Lantus 50 units SQ bedtime   Glipizide 10mg twice daily   Pioglitazone 45mg once daily   Metformin 750mg twice daily   Jardiance 25mg once daily      MEALS:  decreased quantity; calorie conscious   Brunch - breakfast items   Dinner - frozen dinner  Snacks - too many sweets, ice cream intake has decreased   Beverages - diet beverages, water, diet Snapple peach iced tea, apricot juice a few ounces,      Denies exercise regimen     His maximum weight was 250 lbs     Review of Systems   Respiratory:  Negative for shortness of breath.    Cardiovascular:  Negative for chest pain.   Musculoskeletal:  Positive for back pain.   All other systems reviewed and are negative.      Objective   /80 (BP Location: Left arm, Patient Position: Sitting, BP Cuff Size: Adult)   Pulse 74   Ht 1.956 m (6' 5\")   Wt 108 kg (238 lb)   BMI 28.22 kg/m²   Physical Exam  Vitals and nursing note reviewed.   Constitutional:       General: He is not in acute distress.     Appearance: Normal appearance. He is normal weight.   HENT:      Head: Normocephalic and atraumatic.      Nose: Nose normal.      Mouth/Throat:      Mouth: Mucous membranes are " moist.   Eyes:      Extraocular Movements: Extraocular movements intact.   Cardiovascular:      Rate and Rhythm: Normal rate and regular rhythm.   Pulmonary:      Effort: Pulmonary effort is normal.      Breath sounds: Normal breath sounds.   Musculoskeletal:         General: Normal range of motion.   Skin:     General: Skin is warm.   Neurological:      Mental Status: He is alert and oriented to person, place, and time.   Psychiatric:         Mood and Affect: Mood normal.       Lab Review  Glucose (mg/dL)   Date Value   05/31/2024 164 (H)   06/26/2023 139 (H)   06/26/2023 140 (H)   01/09/2023 144 (H)     POC HEMOGLOBIN A1c (%)   Date Value   12/04/2023 8.2 (A)     Hemoglobin A1C (%)   Date Value   05/31/2024 7.9 (H)   06/26/2023 8.3 (A)   06/26/2023 8.2 (A)   08/09/2022 8.2 (A)     Bicarbonate (mmol/L)   Date Value   05/31/2024 28   06/26/2023 27   06/26/2023 28   01/09/2023 28     Urea Nitrogen (mg/dL)   Date Value   05/31/2024 16   06/26/2023 13   06/26/2023 14   01/09/2023 15     Creatinine (mg/dL)   Date Value   05/31/2024 1.02   06/26/2023 1.01   06/26/2023 1.06   01/09/2023 0.97     Lab Results   Component Value Date    CHOL 111 05/31/2024    CHOL 116 06/26/2023    CHOL 115 06/26/2023     Lab Results   Component Value Date    HDL 36.1 05/31/2024    HDL 32.8 (A) 06/26/2023    HDL 32.3 (A) 06/26/2023     Lab Results   Component Value Date    LDLCALC 48 05/31/2024     Lab Results   Component Value Date    TRIG 137 05/31/2024    TRIG 203 (H) 06/26/2023    TRIG 204 (H) 06/26/2023     Lab Results   Component Value Date    TSH 3.69 05/31/2024    A8KXSFX 8.6 05/31/2024    THYROIDPAB >1000 (A) 01/09/2023       Health Maintenance:   Foot Exam:  June 6, 2023  Eye Exam:  2022  Urine Albumin: June 26, 2023    Assessment/Plan     74-year-old male presents for follow up for type 2 diabetes mellitus. He does not check his blood sugars as insurance was not paying for testing supplies historically. His blood pressure is at  goal. He is noted to be on a statin. He is noted to be on an ACE inhibitor as he was found to have microalbuminuria.     He has Hashimoto's thyroiditis.      Type 2 diabetes mellitus (Multi)  To continue Glipizide 10mg twice daily   To continue Jardiance 25mg once daily   To continue Metformin 750mg twie daily   To continue pioglitazone 45mg once daily   To continue Lantus 50 units SQ bedtime   Keep up with dietary diligence   To obtain blood tests before the next appointment     Long-term insulin use (Multi)  Please rotate insulin injection sites        Hypothyroidism  To continue Levothyroxine 150mcg po daily   Take levothyroxine on an empty stomach with water alone, 30-60 minutes before eating or taking other medications, 4 hours before any calcium or iron supplement    For follow up in 6 months

## 2024-06-04 NOTE — PATIENT INSTRUCTIONS
Thank you for choosing [facility]  for your health care needs.  If you have any questions, concerns or medical needs, please feel free to contact our office at [phone].    Please ensure you complete your blood work one week before the next scheduled appointment.    To continue Glipizide 10mg twice daily   To continue Jardiance 25mg once daily   To continue Metformin 750mg twie daily   To continue pioglitazone 45mg once daily   To continue Lantus 50 units SQ bedtime   Keep up with dietary diligence   To continue Levothyroxine 150mcg po daily   Take levothyroxine on an empty stomach with water alone, 30-60 minutes before eating or taking other medications, 4 hours before any calcium or iron supplement.  For follow up in 6 months

## 2024-06-07 NOTE — ASSESSMENT & PLAN NOTE
To continue Glipizide 10mg twice daily   To continue Jardiance 25mg once daily   To continue Metformin 750mg twie daily   To continue pioglitazone 45mg once daily   To continue Lantus 50 units SQ bedtime   Keep up with dietary diligence   To obtain blood tests before the next appointment

## 2024-06-07 NOTE — ASSESSMENT & PLAN NOTE
To continue Levothyroxine 150mcg po daily   Take levothyroxine on an empty stomach with water alone, 30-60 minutes before eating or taking other medications, 4 hours before any calcium or iron supplement    For follow up in 6 months

## 2024-06-22 NOTE — PROGRESS NOTES
Counseling:  The patient was counseled regarding diagnostic results, instructions for management, risk factor reductions, prognosis, patient and family education, impressions, risks and benefits of treatment options and importance of compliance with treatment.      Chief Complaint:   The patient presents today for annual followup of a-fib.     History Of Present Illness:    Juan Varela is a 74 year old male patient who presents today for annual followup of a-fib. His PMH is significant for atrial fibrillation, DM type 2, hyperlipidemia and hypothyroidism. Over the past year, the patient states that he has done well from a cardiac standpoint. He denies any CP, chest discomfort, SOB or palpitations. He denies any recurrences of a-fib. BP has been stable. EKG today shows that the patient is maintaining NSR. The patient is compliant with his prescribed medications.      Last Recorded Vitals:  Vitals:    06/24/24 1501   BP: 118/60   Pulse: 72   Weight: 109 kg (241 lb)       Past Surgical History:  He has a past surgical history that includes Other surgical history (09/02/2021).      Social History:  He reports that he has never smoked. He has never been exposed to tobacco smoke. He has never used smokeless tobacco. He reports that he does not drink alcohol and does not use drugs.    Family History:  No family history on file.     Allergies:  Patient has no known allergies.    Outpatient Medications:  Current Outpatient Medications   Medication Instructions    atorvastatin (LIPITOR) 40 mg, oral, Nightly    empagliflozin (JARDIANCE) 25 mg, oral, Daily    glipiZIDE (GLUCOTROL) 10 mg, oral, 2 times daily    Lantus U-100 Insulin 50 Units, subcutaneous, Nightly    levothyroxine (SYNTHROID, LEVOXYL) 150 mcg, oral, Daily, as directed    lisinopril 2.5 mg, oral, Daily    metFORMIN XR (GLUCOPHAGE-XR) 750 mg, oral, 2 times daily    pioglitazone (ACTOS) 45 mg, oral, Daily    sotalol (Betapace) 120 mg tablet TAKE 1/2 TABLET (60  MG) BY MOUTH 2 TIMES A DAY.     Review of Systems   All other systems reviewed and are negative.     Physical Exam:  Constitutional:       Appearance: Healthy appearance. Not in distress.   Neck:      Vascular: No JVR. JVD normal.   Pulmonary:      Effort: Pulmonary effort is normal.      Breath sounds: Normal breath sounds. No wheezing. No rhonchi. No rales.   Chest:      Chest wall: Not tender to palpatation.   Cardiovascular:      PMI at left midclavicular line. Normal rate. Regular rhythm. Normal S1. Normal S2.       Murmurs: There is no murmur.      No gallop.  No click. No rub.   Pulses:     Intact distal pulses.   Edema:     Peripheral edema absent.   Abdominal:      General: Bowel sounds are normal.      Palpations: Abdomen is soft.      Tenderness: There is no abdominal tenderness.   Musculoskeletal: Normal range of motion.         General: No tenderness. Skin:     General: Skin is warm and dry.   Neurological:      General: No focal deficit present.      Mental Status: Alert and oriented to person, place and time.          Last Labs:  CBC -  Lab Results   Component Value Date    WBC 6.3 08/09/2022    HGB 15.1 08/09/2022    HCT 43.6 08/09/2022    MCV 84 08/09/2022     08/09/2022       CMP -  Lab Results   Component Value Date    CALCIUM 8.7 05/31/2024    PROT 6.4 05/31/2024    ALBUMIN 4.1 05/31/2024    AST 13 05/31/2024    ALT 16 05/31/2024    ALKPHOS 83 05/31/2024    BILITOT 0.7 05/31/2024       LIPID PANEL -   Lab Results   Component Value Date    CHOL 111 05/31/2024    TRIG 137 05/31/2024    HDL 36.1 05/31/2024    CHHDL 3.1 05/31/2024    LDLF 43 06/26/2023    VLDL 27 05/31/2024    NHDL 75 05/31/2024       RENAL FUNCTION PANEL -   Lab Results   Component Value Date    GLUCOSE 164 (H) 05/31/2024     05/31/2024    K 4.0 05/31/2024     05/31/2024    CO2 28 05/31/2024    ANIONGAP 12 05/31/2024    BUN 16 05/31/2024    CREATININE 1.02 05/31/2024    GFRMALE 77 06/26/2023    CALCIUM 8.7  "05/31/2024    ALBUMIN 4.1 05/31/2024        Lab Results   Component Value Date    HGBA1C 7.9 (H) 05/31/2024    HGBA1C 8.2 (A) 12/04/2023       Last Cardiology Tests:  08/30/2022 - TTE  1. Left ventricular systolic function is normal.  2. Spectral Doppler shows an impaired relaxation pattern of left ventricular diastolic filling.  3. Moderately enlarged right ventricle.  4. There is low normal right ventricular systolic function.     08/30/2022 - Vascular Lab Carotid Artery Duplex U/S   1. Right Carotid: Findings are consistent with less than 50% stenosis of the right proximal ICA. Laminar flow seen by color Doppler. Right external carotid artery appears patent with no evidence of stenosis. No evidence of hemodynamically significant stenosis of the right common carotid artery. The right vertebral artery is patent with antegrade flow. No evidence of hemodynamically significant stenosis in the right subclavian.  2. Left Carotid: Findings are consistent with less than 50% stenosis of the left proximal ICA. Laminar flow seen by color Doppler. Left external carotid artery appears patent with no evidence of stenosis. No evidence of hemodynamically significant stenosis of the left common carotid artery. The left vertebral artery is patent with antegrade flow. No evidence of hemodynamically significant stenosis in the left subclavian.    Lab review: I have personally reviewed the laboratory result(s).    Assessment/Plan   1) Dizziness and Lightheadedness  Negative Carotid Doppler  Holter negative for pauses  Started on lisinopril by endocrinologist for management of dizziness and balance issues with \"90-95%\" improvement in symptoms.     2) Paroxysmal A-Fib  On Sotalol 120 mg daily  Maintaining NSR  QTc ok  Echocardiogram negative  Saw Dr. Marsh 09/26/2022 who recommended ILR - insurance would not cover procedure   Denies CP, chest discomfort or SOB  Denies palpitations or episodes of a-fib  Continue current medical " Rx  F/U 1 year       Scribe Attestation  By signing my name below, I, Dilan Maloney   attest that this documentation has been prepared under the direction and in the presence of Rick Reynolds MD.    Topical Retinoid counseling:  Patient advised to apply a pea-sized amount only at bedtime and wait 30 minutes after washing their face before applying.  If too drying, patient may add a non-comedogenic moisturizer. The patient verbalized understanding of the proper use and possible adverse effects of retinoids.  All of the patient's questions and concerns were addressed.

## 2024-06-24 ENCOUNTER — OFFICE VISIT (OUTPATIENT)
Dept: CARDIOLOGY | Facility: CLINIC | Age: 74
End: 2024-06-24
Payer: MEDICARE

## 2024-06-24 VITALS
WEIGHT: 241 LBS | BODY MASS INDEX: 28.58 KG/M2 | SYSTOLIC BLOOD PRESSURE: 118 MMHG | DIASTOLIC BLOOD PRESSURE: 60 MMHG | HEART RATE: 72 BPM

## 2024-06-24 DIAGNOSIS — I48.0 AF (PAROXYSMAL ATRIAL FIBRILLATION) (MULTI): Primary | Chronic | ICD-10-CM

## 2024-06-24 PROBLEM — L30.9 ECZEMA OF HAND: Status: ACTIVE | Noted: 2024-06-24

## 2024-06-24 PROBLEM — E66.9 OBESITY: Status: ACTIVE | Noted: 2024-06-24

## 2024-06-24 PROBLEM — R26.89 IMPAIRMENT OF BALANCE: Status: ACTIVE | Noted: 2024-06-24

## 2024-06-24 PROBLEM — R55 PRE-SYNCOPE: Status: ACTIVE | Noted: 2024-06-24

## 2024-06-24 PROCEDURE — 99213 OFFICE O/P EST LOW 20 MIN: CPT | Performed by: INTERNAL MEDICINE

## 2024-06-24 PROCEDURE — 1159F MED LIST DOCD IN RCRD: CPT | Performed by: INTERNAL MEDICINE

## 2024-06-24 PROCEDURE — 1160F RVW MEDS BY RX/DR IN RCRD: CPT | Performed by: INTERNAL MEDICINE

## 2024-06-24 PROCEDURE — 93000 ELECTROCARDIOGRAM COMPLETE: CPT | Performed by: INTERNAL MEDICINE

## 2024-06-24 NOTE — LETTER
June 24, 2024     Roseanna Durbin DO  [address]  [place] [address]    Patient: Juan Varela   YOB: 1950   Date of Visit: 6/24/2024       Dear Dr. Roseanna Durbin DO:    Thank you for referring Juan Varela to me for evaluation. Below are my notes for this consultation.  If you have questions, please do not hesitate to call me. I look forward to following your patient along with you.       Sincerely,     Rick Reynolds MD      CC: No Recipients  ______________________________________________________________________________________    Counseling:  The patient was counseled regarding diagnostic results, instructions for management, risk factor reductions, prognosis, patient and family education, impressions, risks and benefits of treatment options and importance of compliance with treatment.      Chief Complaint:   The patient presents today for annual followup of a-fib.     History Of Present Illness:    Juan Varela is a 74 year old male patient who presents today for annual followup of a-fib. His PMH is significant for atrial fibrillation, DM type 2, hyperlipidemia and hypothyroidism. Over the past year, the patient states that he has done well from a cardiac standpoint. He denies any CP, chest discomfort, SOB or palpitations. He denies any recurrences of a-fib. BP has been stable. EKG today shows that the patient is maintaining NSR. The patient is compliant with his prescribed medications.      Last Recorded Vitals:  Vitals:    06/24/24 1501   BP: 118/60   Pulse: 72   Weight: 109 kg (241 lb)       Past Surgical History:  He has a past surgical history that includes Other surgical history (09/02/2021).      Social History:  He reports that he has never smoked. He has never been exposed to tobacco smoke. He has never used smokeless tobacco. He reports that he does not drink alcohol and does not use drugs.    Family History:  No family history on  file.     Allergies:  Patient has no known allergies.    Outpatient Medications:  Current Outpatient Medications   Medication Instructions   • atorvastatin (LIPITOR) 40 mg, oral, Nightly   • empagliflozin (JARDIANCE) 25 mg, oral, Daily   • glipiZIDE (GLUCOTROL) 10 mg, oral, 2 times daily   • Lantus U-100 Insulin 50 Units, subcutaneous, Nightly   • levothyroxine (SYNTHROID, LEVOXYL) 150 mcg, oral, Daily, as directed   • lisinopril 2.5 mg, oral, Daily   • metFORMIN XR (GLUCOPHAGE-XR) 750 mg, oral, 2 times daily   • pioglitazone (ACTOS) 45 mg, oral, Daily   • sotalol (Betapace) 120 mg tablet TAKE 1/2 TABLET (60 MG) BY MOUTH 2 TIMES A DAY.     Review of Systems   All other systems reviewed and are negative.     Physical Exam:  Constitutional:       Appearance: Healthy appearance. Not in distress.   Neck:      Vascular: No JVR. JVD normal.   Pulmonary:      Effort: Pulmonary effort is normal.      Breath sounds: Normal breath sounds. No wheezing. No rhonchi. No rales.   Chest:      Chest wall: Not tender to palpatation.   Cardiovascular:      PMI at left midclavicular line. Normal rate. Regular rhythm. Normal S1. Normal S2.       Murmurs: There is no murmur.      No gallop.  No click. No rub.   Pulses:     Intact distal pulses.   Edema:     Peripheral edema absent.   Abdominal:      General: Bowel sounds are normal.      Palpations: Abdomen is soft.      Tenderness: There is no abdominal tenderness.   Musculoskeletal: Normal range of motion.         General: No tenderness. Skin:     General: Skin is warm and dry.   Neurological:      General: No focal deficit present.      Mental Status: Alert and oriented to person, place and time.          Last Labs:  CBC -  Lab Results   Component Value Date    WBC 6.3 08/09/2022    HGB 15.1 08/09/2022    HCT 43.6 08/09/2022    MCV 84 08/09/2022     08/09/2022       CMP -  Lab Results   Component Value Date    CALCIUM 8.7 05/31/2024    PROT 6.4 05/31/2024    ALBUMIN 4.1  05/31/2024    AST 13 05/31/2024    ALT 16 05/31/2024    ALKPHOS 83 05/31/2024    BILITOT 0.7 05/31/2024       LIPID PANEL -   Lab Results   Component Value Date    CHOL 111 05/31/2024    TRIG 137 05/31/2024    HDL 36.1 05/31/2024    CHHDL 3.1 05/31/2024    LDLF 43 06/26/2023    VLDL 27 05/31/2024    NHDL 75 05/31/2024       RENAL FUNCTION PANEL -   Lab Results   Component Value Date    GLUCOSE 164 (H) 05/31/2024     05/31/2024    K 4.0 05/31/2024     05/31/2024    CO2 28 05/31/2024    ANIONGAP 12 05/31/2024    BUN 16 05/31/2024    CREATININE 1.02 05/31/2024    GFRMALE 77 06/26/2023    CALCIUM 8.7 05/31/2024    ALBUMIN 4.1 05/31/2024        Lab Results   Component Value Date    HGBA1C 7.9 (H) 05/31/2024    HGBA1C 8.2 (A) 12/04/2023       Last Cardiology Tests:  08/30/2022 - TTE  1. Left ventricular systolic function is normal.  2. Spectral Doppler shows an impaired relaxation pattern of left ventricular diastolic filling.  3. Moderately enlarged right ventricle.  4. There is low normal right ventricular systolic function.     08/30/2022 - Vascular Lab Carotid Artery Duplex U/S   1. Right Carotid: Findings are consistent with less than 50% stenosis of the right proximal ICA. Laminar flow seen by color Doppler. Right external carotid artery appears patent with no evidence of stenosis. No evidence of hemodynamically significant stenosis of the right common carotid artery. The right vertebral artery is patent with antegrade flow. No evidence of hemodynamically significant stenosis in the right subclavian.  2. Left Carotid: Findings are consistent with less than 50% stenosis of the left proximal ICA. Laminar flow seen by color Doppler. Left external carotid artery appears patent with no evidence of stenosis. No evidence of hemodynamically significant stenosis of the left common carotid artery. The left vertebral artery is patent with antegrade flow. No evidence of hemodynamically significant stenosis in the  "left subclavian.    Lab review: I have personally reviewed the laboratory result(s).    Assessment/Plan  1) Dizziness and Lightheadedness  Negative Carotid Doppler  Holter negative for pauses  Started on lisinopril by endocrinologist for management of dizziness and balance issues with \"90-95%\" improvement in symptoms.     2) Paroxysmal A-Fib  On Sotalol 120 mg daily  Maintaining NSR  QTc ok  Echocardiogram negative  Saw Dr. Marsh 09/26/2022 who recommended ILR - insurance would not cover procedure   Denies CP, chest discomfort or SOB  Denies palpitations or episodes of a-fib  Continue current medical Rx  F/U 1 year       Scribe Attestation  By signing my name below, I, Dilan Maloney   attest that this documentation has been prepared under the direction and in the presence of Rick Reynolds MD.     "

## 2024-06-26 ENCOUNTER — APPOINTMENT (OUTPATIENT)
Dept: PRIMARY CARE | Facility: CLINIC | Age: 74
End: 2024-06-26
Payer: MEDICARE

## 2024-06-26 VITALS
WEIGHT: 241 LBS | HEART RATE: 64 BPM | DIASTOLIC BLOOD PRESSURE: 80 MMHG | HEIGHT: 77 IN | OXYGEN SATURATION: 97 % | BODY MASS INDEX: 28.46 KG/M2 | SYSTOLIC BLOOD PRESSURE: 126 MMHG

## 2024-06-26 DIAGNOSIS — R97.20 PSA ELEVATION: Chronic | ICD-10-CM

## 2024-06-26 DIAGNOSIS — Z00.00 MEDICARE ANNUAL WELLNESS VISIT, SUBSEQUENT: Primary | ICD-10-CM

## 2024-06-26 DIAGNOSIS — E11.9 TYPE 2 DIABETES MELLITUS WITHOUT COMPLICATION, WITHOUT LONG-TERM CURRENT USE OF INSULIN (MULTI): Chronic | ICD-10-CM

## 2024-06-26 DIAGNOSIS — I48.0 AF (PAROXYSMAL ATRIAL FIBRILLATION) (MULTI): Chronic | ICD-10-CM

## 2024-06-26 PROCEDURE — 99397 PER PM REEVAL EST PAT 65+ YR: CPT | Performed by: STUDENT IN AN ORGANIZED HEALTH CARE EDUCATION/TRAINING PROGRAM

## 2024-06-26 PROCEDURE — 1158F ADVNC CARE PLAN TLK DOCD: CPT | Performed by: STUDENT IN AN ORGANIZED HEALTH CARE EDUCATION/TRAINING PROGRAM

## 2024-06-26 PROCEDURE — G0439 PPPS, SUBSEQ VISIT: HCPCS | Performed by: STUDENT IN AN ORGANIZED HEALTH CARE EDUCATION/TRAINING PROGRAM

## 2024-06-26 PROCEDURE — 1123F ACP DISCUSS/DSCN MKR DOCD: CPT | Performed by: STUDENT IN AN ORGANIZED HEALTH CARE EDUCATION/TRAINING PROGRAM

## 2024-06-26 PROCEDURE — 1159F MED LIST DOCD IN RCRD: CPT | Performed by: STUDENT IN AN ORGANIZED HEALTH CARE EDUCATION/TRAINING PROGRAM

## 2024-06-26 PROCEDURE — 99214 OFFICE O/P EST MOD 30 MIN: CPT | Performed by: STUDENT IN AN ORGANIZED HEALTH CARE EDUCATION/TRAINING PROGRAM

## 2024-06-26 PROCEDURE — 1160F RVW MEDS BY RX/DR IN RCRD: CPT | Performed by: STUDENT IN AN ORGANIZED HEALTH CARE EDUCATION/TRAINING PROGRAM

## 2024-06-26 PROCEDURE — 1036F TOBACCO NON-USER: CPT | Performed by: STUDENT IN AN ORGANIZED HEALTH CARE EDUCATION/TRAINING PROGRAM

## 2024-06-26 PROCEDURE — 1170F FXNL STATUS ASSESSED: CPT | Performed by: STUDENT IN AN ORGANIZED HEALTH CARE EDUCATION/TRAINING PROGRAM

## 2024-06-26 ASSESSMENT — ENCOUNTER SYMPTOMS
PALPITATIONS: 0
SHORTNESS OF BREATH: 0
DEPRESSION: 0
OCCASIONAL FEELINGS OF UNSTEADINESS: 0
FEVER: 0
WHEEZING: 0
LOSS OF SENSATION IN FEET: 0
FATIGUE: 0
DECREASED CONCENTRATION: 0
CONFUSION: 0
COUGH: 0

## 2024-06-26 ASSESSMENT — ACTIVITIES OF DAILY LIVING (ADL)
DOING_HOUSEWORK: INDEPENDENT
TAKING_MEDICATION: INDEPENDENT
GROCERY_SHOPPING: INDEPENDENT
MANAGING_FINANCES: INDEPENDENT
DRESSING: INDEPENDENT
BATHING: INDEPENDENT

## 2024-06-26 ASSESSMENT — PATIENT HEALTH QUESTIONNAIRE - PHQ9
1. LITTLE INTEREST OR PLEASURE IN DOING THINGS: NOT AT ALL
SUM OF ALL RESPONSES TO PHQ9 QUESTIONS 1 AND 2: 0
2. FEELING DOWN, DEPRESSED OR HOPELESS: NOT AT ALL

## 2024-06-26 NOTE — ASSESSMENT & PLAN NOTE
PNEUMONIA vaccine- Completed  SHINGLES vaccine- Recommend at pharmacy   Screening tests:  Colon cancer screening--> Not indicated  Prostate Cancer Screening--> Elevated, UTD, setting up with urology  During the course of the visit the patient was educated and counseled about age appropriate screening and preventive services. Completed preventive screenings were documented in the chart and orders were placed for outstanding screenings/procedures as documented in the Assessment and Plan.    Patient Instructions (the written plan) was given to the patient at check out that include any community based lifestyle interventions.    Other risk factors and conditions for which interventions are recommended are addressed as the other tagged diagnoses in this encounter.

## 2024-06-26 NOTE — ASSESSMENT & PLAN NOTE
Patient declines Urology referral, states his issue is his nasal spray  He is agreeable to rechecking in 6 months, if still high will go see specialist

## 2024-06-26 NOTE — ASSESSMENT & PLAN NOTE
A1c improving 8.3 to 7.9  Seeing Dr. Jamee Stover 25mg, Glipizide 10mg BID, Metformin 750mg BID, actos 45mg, Lantus 50 units at bedtime  He is on an ACE and statin  Recommend annual eye exam - he declines referral, will shop around looking for new provider

## 2024-06-26 NOTE — PROGRESS NOTES
Subjective   Reason for Visit: Juan Varela is an 74 y.o. male here for a Medicare Wellness visit.     Past Medical, Surgical, and Family History reviewed and updated in chart.    Reviewed all medications by prescribing practitioner or clinical pharmacist (such as prescriptions, OTCs, herbal therapies and supplements) and documented in the medical record.    HPI    73 yo male presents for follow up and medicare   No questions or concerns today   A1c 7.9 from 8.3 improving   PSA increasing 4.8 to 6.1    Patient Care Team:  Roseanna Durbin DO as PCP - General (Family Medicine)  Genesis Mancuso MD as Endocrinologist (Endocrinology)  Rick Reynolds MD as Consulting Physician (Cardiology)     History reviewed. No pertinent past medical history.  Past Surgical History:   Procedure Laterality Date    OTHER SURGICAL HISTORY  09/02/2021    Cholecystectomy     Family History   Family history unknown: Yes     Body mass index is 28.58 kg/m².    Tobacco/Alcohol/Opioid use, as well as Illicit Drug Use was screened for/reviewed and documented in Social History section and medication list as appropriate    Medications and Supplements  prescribed by me and other practitioners or clinical pharmacist (such as prescriptions, OTC's, herbal therapies and supplements) were reviewed and documented in the medical record.    Tobacco/Alcohol/Opioid use, as well as Illicit Drug Use was screened for/reviewed and documented in Social History section and medication list as appropriate    Review of Systems   Constitutional:  Negative for fatigue and fever.   Eyes:  Negative for visual disturbance.   Respiratory:  Negative for cough, shortness of breath and wheezing.    Cardiovascular:  Negative for chest pain, palpitations and leg swelling.   Musculoskeletal:  Negative for gait problem.   Allergic/Immunologic: Positive for environmental allergies.   Psychiatric/Behavioral:  Negative for confusion and decreased concentration.   "    Objective   Vitals:  /80 (BP Location: Left arm, Patient Position: Sitting)   Pulse 64   Ht 1.956 m (6' 5\")   Wt 109 kg (241 lb)   SpO2 97%   BMI 28.58 kg/m²       Physical Exam  Constitutional:       General: He is not in acute distress.     Appearance: Normal appearance. He is not ill-appearing.   HENT:      Head: Normocephalic and atraumatic.      Right Ear: Hearing normal.      Left Ear: Hearing normal.      Mouth/Throat:      Pharynx: No oropharyngeal exudate or posterior oropharyngeal erythema.   Eyes:      Extraocular Movements: Extraocular movements intact.   Cardiovascular:      Rate and Rhythm: Normal rate and regular rhythm.   Pulmonary:      Effort: Pulmonary effort is normal. No respiratory distress.   Abdominal:      Tenderness: There is no abdominal tenderness.   Musculoskeletal:      Cervical back: No tenderness.      Right lower leg: No edema.      Left lower leg: No edema.   Skin:     General: Skin is warm and dry.   Neurological:      General: No focal deficit present.      Mental Status: He is alert and oriented to person, place, and time.   Psychiatric:         Mood and Affect: Mood normal.         Behavior: Behavior normal.     Assessment/Plan   Problem List Items Addressed This Visit       AF (paroxysmal atrial fibrillation) (Multi) (Chronic)    Current Assessment & Plan     Anticoagulation: No   Any concerns for bleeding: no  Rate Control: No  Rhythm Control: Yes, describe: Sotalol  Est with Dr. Marsh              Type 2 diabetes mellitus (Multi) (Chronic)    Current Assessment & Plan     A1c improving 8.3 to 7.9  Seeing Dr. Jamee Stover 25mg, Glipizide 10mg BID, Metformin 750mg BID, actos 45mg, Lantus 50 units at bedtime  He is on an ACE and statin  Recommend annual eye exam - he declines referral, will shop around looking for new provider          Relevant Orders    Follow Up In [facility] - PCP - Established    PSA elevation (Chronic)    Current " Assessment & Plan     Patient declines Urology referral, states his issue is his nasal spray  He is agreeable to rechecking in 6 months, if still high will go see specialist           Relevant Orders    PSA    Medicare annual wellness visit, subsequent - Primary    Current Assessment & Plan     PNEUMONIA vaccine- Completed  SHINGLES vaccine- Recommend at pharmacy   Screening tests:  Colon cancer screening--> Not indicated  Prostate Cancer Screening--> Elevated, UTD, setting up with urology  During the course of the visit the patient was educated and counseled about age appropriate screening and preventive services. Completed preventive screenings were documented in the chart and orders were placed for outstanding screenings/procedures as documented in the Assessment and Plan.    Patient Instructions (the written plan) was given to the patient at check out that include any community based lifestyle interventions.    Other risk factors and conditions for which interventions are recommended are addressed as the other tagged diagnoses in this encounter.

## 2024-06-26 NOTE — ASSESSMENT & PLAN NOTE
Anticoagulation: No   Any concerns for bleeding: no  Rate Control: No  Rhythm Control: Yes, describe: Sotalol  Est with Dr. Marsh

## 2024-06-27 ENCOUNTER — HOSPITAL ENCOUNTER (OUTPATIENT)
Facility: HOSPITAL | Age: 74
Setting detail: OBSERVATION
Discharge: HOME | End: 2024-06-28
Attending: EMERGENCY MEDICINE | Admitting: INTERNAL MEDICINE
Payer: MEDICARE

## 2024-06-27 ENCOUNTER — APPOINTMENT (OUTPATIENT)
Dept: RADIOLOGY | Facility: HOSPITAL | Age: 74
End: 2024-06-27
Payer: MEDICARE

## 2024-06-27 ENCOUNTER — APPOINTMENT (OUTPATIENT)
Dept: CARDIOLOGY | Facility: HOSPITAL | Age: 74
End: 2024-06-27
Payer: MEDICARE

## 2024-06-27 DIAGNOSIS — E78.49 OTHER HYPERLIPIDEMIA: Chronic | ICD-10-CM

## 2024-06-27 DIAGNOSIS — I48.0 AF (PAROXYSMAL ATRIAL FIBRILLATION) (MULTI): Chronic | ICD-10-CM

## 2024-06-27 DIAGNOSIS — Z79.4 TYPE 2 DIABETES MELLITUS WITH HYPOGLYCEMIA WITHOUT COMA, WITH LONG-TERM CURRENT USE OF INSULIN (MULTI): Chronic | ICD-10-CM

## 2024-06-27 DIAGNOSIS — R55 NEAR SYNCOPE: ICD-10-CM

## 2024-06-27 DIAGNOSIS — R55 SYNCOPE AND COLLAPSE: ICD-10-CM

## 2024-06-27 DIAGNOSIS — R97.20 PSA ELEVATION: Chronic | ICD-10-CM

## 2024-06-27 DIAGNOSIS — J30.2 SEASONAL ALLERGIES: ICD-10-CM

## 2024-06-27 DIAGNOSIS — L30.9 ECZEMA OF HAND: ICD-10-CM

## 2024-06-27 DIAGNOSIS — Z79.4 LONG-TERM INSULIN USE (MULTI): ICD-10-CM

## 2024-06-27 DIAGNOSIS — E11.649 TYPE 2 DIABETES MELLITUS WITH HYPOGLYCEMIA WITHOUT COMA, WITH LONG-TERM CURRENT USE OF INSULIN (MULTI): Chronic | ICD-10-CM

## 2024-06-27 DIAGNOSIS — E03.9 ACQUIRED HYPOTHYROIDISM: Chronic | ICD-10-CM

## 2024-06-27 DIAGNOSIS — Z00.00 MEDICARE ANNUAL WELLNESS VISIT, SUBSEQUENT: ICD-10-CM

## 2024-06-27 DIAGNOSIS — R26.89 IMPAIRMENT OF BALANCE: ICD-10-CM

## 2024-06-27 DIAGNOSIS — E16.2 HYPOGLYCEMIA: Primary | ICD-10-CM

## 2024-06-27 LAB
ALBUMIN SERPL BCP-MCNC: 4.1 G/DL (ref 3.4–5)
ALP SERPL-CCNC: 70 U/L (ref 33–136)
ALT SERPL W P-5'-P-CCNC: 15 U/L (ref 10–52)
ANION GAP SERPL CALC-SCNC: 9 MMOL/L (ref 10–20)
AST SERPL W P-5'-P-CCNC: 17 U/L (ref 9–39)
BASOPHILS # BLD AUTO: 0.05 X10*3/UL (ref 0–0.1)
BASOPHILS NFR BLD AUTO: 0.5 %
BILIRUB SERPL-MCNC: 0.5 MG/DL (ref 0–1.2)
BUN SERPL-MCNC: 18 MG/DL (ref 6–23)
CALCIUM SERPL-MCNC: 8.6 MG/DL (ref 8.6–10.3)
CARDIAC TROPONIN I PNL SERPL HS: 4 NG/L (ref 0–20)
CARDIAC TROPONIN I PNL SERPL HS: 5 NG/L (ref 0–20)
CHLORIDE SERPL-SCNC: 107 MMOL/L (ref 98–107)
CO2 SERPL-SCNC: 27 MMOL/L (ref 21–32)
CREAT SERPL-MCNC: 0.99 MG/DL (ref 0.5–1.3)
EGFRCR SERPLBLD CKD-EPI 2021: 79 ML/MIN/1.73M*2
EOSINOPHIL # BLD AUTO: 0.17 X10*3/UL (ref 0–0.4)
EOSINOPHIL NFR BLD AUTO: 1.7 %
ERYTHROCYTE [DISTWIDTH] IN BLOOD BY AUTOMATED COUNT: 13.3 % (ref 11.5–14.5)
GLUCOSE BLD MANUAL STRIP-MCNC: 151 MG/DL (ref 74–99)
GLUCOSE BLD MANUAL STRIP-MCNC: 198 MG/DL (ref 74–99)
GLUCOSE BLD MANUAL STRIP-MCNC: 218 MG/DL (ref 74–99)
GLUCOSE BLD MANUAL STRIP-MCNC: 222 MG/DL (ref 74–99)
GLUCOSE BLD MANUAL STRIP-MCNC: 79 MG/DL (ref 74–99)
GLUCOSE BLD MANUAL STRIP-MCNC: 90 MG/DL (ref 74–99)
GLUCOSE BLD MANUAL STRIP-MCNC: 98 MG/DL (ref 74–99)
GLUCOSE SERPL-MCNC: 119 MG/DL (ref 74–99)
HCT VFR BLD AUTO: 41.1 % (ref 41–52)
HGB BLD-MCNC: 13.6 G/DL (ref 13.5–17.5)
IMM GRANULOCYTES # BLD AUTO: 0.04 X10*3/UL (ref 0–0.5)
IMM GRANULOCYTES NFR BLD AUTO: 0.4 % (ref 0–0.9)
LYMPHOCYTES # BLD AUTO: 0.94 X10*3/UL (ref 0.8–3)
LYMPHOCYTES NFR BLD AUTO: 9.2 %
MAGNESIUM SERPL-MCNC: 1.84 MG/DL (ref 1.6–2.4)
MCH RBC QN AUTO: 27.2 PG (ref 26–34)
MCHC RBC AUTO-ENTMCNC: 33.1 G/DL (ref 32–36)
MCV RBC AUTO: 82 FL (ref 80–100)
MONOCYTES # BLD AUTO: 0.69 X10*3/UL (ref 0.05–0.8)
MONOCYTES NFR BLD AUTO: 6.8 %
NEUTROPHILS # BLD AUTO: 8.31 X10*3/UL (ref 1.6–5.5)
NEUTROPHILS NFR BLD AUTO: 81.4 %
NRBC BLD-RTO: 0 /100 WBCS (ref 0–0)
PHOSPHATE SERPL-MCNC: 2.9 MG/DL (ref 2.5–4.9)
PLATELET # BLD AUTO: 188 X10*3/UL (ref 150–450)
POTASSIUM SERPL-SCNC: 4.3 MMOL/L (ref 3.5–5.3)
PROT SERPL-MCNC: 6.6 G/DL (ref 6.4–8.2)
RBC # BLD AUTO: 5 X10*6/UL (ref 4.5–5.9)
SODIUM SERPL-SCNC: 139 MMOL/L (ref 136–145)
WBC # BLD AUTO: 10.2 X10*3/UL (ref 4.4–11.3)

## 2024-06-27 PROCEDURE — 93005 ELECTROCARDIOGRAM TRACING: CPT

## 2024-06-27 PROCEDURE — 71045 X-RAY EXAM CHEST 1 VIEW: CPT

## 2024-06-27 PROCEDURE — 2500000004 HC RX 250 GENERAL PHARMACY W/ HCPCS (ALT 636 FOR OP/ED): Performed by: NURSE PRACTITIONER

## 2024-06-27 PROCEDURE — 36415 COLL VENOUS BLD VENIPUNCTURE: CPT | Performed by: EMERGENCY MEDICINE

## 2024-06-27 PROCEDURE — 84100 ASSAY OF PHOSPHORUS: CPT | Performed by: EMERGENCY MEDICINE

## 2024-06-27 PROCEDURE — 2500000001 HC RX 250 WO HCPCS SELF ADMINISTERED DRUGS (ALT 637 FOR MEDICARE OP): Performed by: INTERNAL MEDICINE

## 2024-06-27 PROCEDURE — 99222 1ST HOSP IP/OBS MODERATE 55: CPT | Performed by: INTERNAL MEDICINE

## 2024-06-27 PROCEDURE — 82947 ASSAY GLUCOSE BLOOD QUANT: CPT

## 2024-06-27 PROCEDURE — 80053 COMPREHEN METABOLIC PANEL: CPT | Performed by: EMERGENCY MEDICINE

## 2024-06-27 PROCEDURE — 84484 ASSAY OF TROPONIN QUANT: CPT | Mod: 91 | Performed by: EMERGENCY MEDICINE

## 2024-06-27 PROCEDURE — 83735 ASSAY OF MAGNESIUM: CPT | Performed by: EMERGENCY MEDICINE

## 2024-06-27 PROCEDURE — 85025 COMPLETE CBC W/AUTO DIFF WBC: CPT | Performed by: EMERGENCY MEDICINE

## 2024-06-27 PROCEDURE — 99285 EMERGENCY DEPT VISIT HI MDM: CPT

## 2024-06-27 PROCEDURE — G0378 HOSPITAL OBSERVATION PER HR: HCPCS

## 2024-06-27 PROCEDURE — 84484 ASSAY OF TROPONIN QUANT: CPT | Performed by: EMERGENCY MEDICINE

## 2024-06-27 PROCEDURE — 99223 1ST HOSP IP/OBS HIGH 75: CPT | Performed by: NURSE PRACTITIONER

## 2024-06-27 PROCEDURE — 82947 ASSAY GLUCOSE BLOOD QUANT: CPT | Mod: 91

## 2024-06-27 PROCEDURE — 71045 X-RAY EXAM CHEST 1 VIEW: CPT | Mod: FOREIGN READ | Performed by: RADIOLOGY

## 2024-06-27 PROCEDURE — 96360 HYDRATION IV INFUSION INIT: CPT

## 2024-06-27 PROCEDURE — 96361 HYDRATE IV INFUSION ADD-ON: CPT

## 2024-06-27 RX ORDER — ENOXAPARIN SODIUM 100 MG/ML
40 INJECTION SUBCUTANEOUS EVERY 24 HOURS
Status: DISCONTINUED | OUTPATIENT
Start: 2024-06-27 | End: 2024-06-28 | Stop reason: HOSPADM

## 2024-06-27 RX ORDER — DEXTROSE 50 % IN WATER (D50W) INTRAVENOUS SYRINGE
12.5
Status: DISCONTINUED | OUTPATIENT
Start: 2024-06-27 | End: 2024-06-28 | Stop reason: HOSPADM

## 2024-06-27 RX ORDER — PANTOPRAZOLE SODIUM 40 MG/1
40 TABLET, DELAYED RELEASE ORAL
Status: DISCONTINUED | OUTPATIENT
Start: 2024-06-28 | End: 2024-06-28 | Stop reason: HOSPADM

## 2024-06-27 RX ORDER — PANTOPRAZOLE SODIUM 40 MG/10ML
40 INJECTION, POWDER, LYOPHILIZED, FOR SOLUTION INTRAVENOUS
Status: DISCONTINUED | OUTPATIENT
Start: 2024-06-28 | End: 2024-06-28 | Stop reason: HOSPADM

## 2024-06-27 RX ORDER — DEXTROSE 50 % IN WATER (D50W) INTRAVENOUS SYRINGE
12.5 ONCE
Status: DISCONTINUED | OUTPATIENT
Start: 2024-06-27 | End: 2024-06-28 | Stop reason: HOSPADM

## 2024-06-27 RX ORDER — DEXTROSE 50 % IN WATER (D50W) INTRAVENOUS SYRINGE
25
Status: DISCONTINUED | OUTPATIENT
Start: 2024-06-27 | End: 2024-06-28 | Stop reason: HOSPADM

## 2024-06-27 RX ORDER — LEVOTHYROXINE SODIUM 75 UG/1
150 TABLET ORAL DAILY
Status: DISCONTINUED | OUTPATIENT
Start: 2024-06-27 | End: 2024-06-28 | Stop reason: HOSPADM

## 2024-06-27 RX ORDER — SOTALOL HYDROCHLORIDE 120 MG/1
60 TABLET ORAL EVERY 12 HOURS
Status: DISCONTINUED | OUTPATIENT
Start: 2024-06-27 | End: 2024-06-28 | Stop reason: HOSPADM

## 2024-06-27 RX ORDER — DEXTROSE, SODIUM CHLORIDE, SODIUM LACTATE, POTASSIUM CHLORIDE, AND CALCIUM CHLORIDE 5; .6; .31; .03; .02 G/100ML; G/100ML; G/100ML; G/100ML; G/100ML
75 INJECTION, SOLUTION INTRAVENOUS CONTINUOUS
Status: ACTIVE | OUTPATIENT
Start: 2024-06-27 | End: 2024-06-28

## 2024-06-27 RX ORDER — POLYETHYLENE GLYCOL 3350 17 G/17G
17 POWDER, FOR SOLUTION ORAL DAILY
Status: DISCONTINUED | OUTPATIENT
Start: 2024-06-27 | End: 2024-06-28 | Stop reason: HOSPADM

## 2024-06-27 RX ORDER — LISINOPRIL 2.5 MG/1
2.5 TABLET ORAL DAILY
Status: DISCONTINUED | OUTPATIENT
Start: 2024-06-27 | End: 2024-06-28 | Stop reason: HOSPADM

## 2024-06-27 RX ORDER — TALC
3 POWDER (GRAM) TOPICAL NIGHTLY PRN
Status: DISCONTINUED | OUTPATIENT
Start: 2024-06-27 | End: 2024-06-28 | Stop reason: HOSPADM

## 2024-06-27 RX ORDER — SOTALOL HYDROCHLORIDE 80 MG/1
120 TABLET ORAL DAILY
Status: CANCELLED | OUTPATIENT
Start: 2024-06-27

## 2024-06-27 RX ORDER — ATORVASTATIN CALCIUM 40 MG/1
40 TABLET, FILM COATED ORAL NIGHTLY
Status: DISCONTINUED | OUTPATIENT
Start: 2024-06-27 | End: 2024-06-28 | Stop reason: HOSPADM

## 2024-06-27 SDOH — ECONOMIC STABILITY: INCOME INSECURITY: IN THE LAST 12 MONTHS, WAS THERE A TIME WHEN YOU WERE NOT ABLE TO PAY THE MORTGAGE OR RENT ON TIME?: NO

## 2024-06-27 SDOH — ECONOMIC STABILITY: TRANSPORTATION INSECURITY
IN THE PAST 12 MONTHS, HAS LACK OF TRANSPORTATION KEPT YOU FROM MEETINGS, WORK, OR FROM GETTING THINGS NEEDED FOR DAILY LIVING?: NO

## 2024-06-27 SDOH — ECONOMIC STABILITY: HOUSING INSECURITY: IN THE LAST 12 MONTHS, HOW MANY PLACES HAVE YOU LIVED?: 1

## 2024-06-27 SDOH — ECONOMIC STABILITY: HOUSING INSECURITY
IN THE LAST 12 MONTHS, WAS THERE A TIME WHEN YOU DID NOT HAVE A STEADY PLACE TO SLEEP OR SLEPT IN A SHELTER (INCLUDING NOW)?: NO

## 2024-06-27 SDOH — SOCIAL STABILITY: SOCIAL INSECURITY: ABUSE: ADULT

## 2024-06-27 SDOH — ECONOMIC STABILITY: TRANSPORTATION INSECURITY
IN THE PAST 12 MONTHS, HAS THE LACK OF TRANSPORTATION KEPT YOU FROM MEDICAL APPOINTMENTS OR FROM GETTING MEDICATIONS?: NO

## 2024-06-27 SDOH — ECONOMIC STABILITY: INCOME INSECURITY: HOW HARD IS IT FOR YOU TO PAY FOR THE VERY BASICS LIKE FOOD, HOUSING, MEDICAL CARE, AND HEATING?: NOT VERY HARD

## 2024-06-27 SDOH — SOCIAL STABILITY: SOCIAL INSECURITY: WERE YOU ABLE TO COMPLETE ALL THE BEHAVIORAL HEALTH SCREENINGS?: YES

## 2024-06-27 SDOH — SOCIAL STABILITY: SOCIAL INSECURITY: HAVE YOU HAD THOUGHTS OF HARMING ANYONE ELSE?: NO

## 2024-06-27 ASSESSMENT — ENCOUNTER SYMPTOMS
HEMATURIA: 0
VOICE CHANGE: 0
LIGHT-HEADEDNESS: 0
DYSPHORIC MOOD: 0
HEADACHES: 1
NERVOUS/ANXIOUS: 0
SHORTNESS OF BREATH: 0
ABDOMINAL PAIN: 0
TREMORS: 0
ARTHRALGIAS: 0
CHILLS: 0
BRUISES/BLEEDS EASILY: 0
FREQUENCY: 0
SEIZURES: 0
DIAPHORESIS: 1
ADENOPATHY: 0
MYALGIAS: 0
WOUND: 0
WHEEZING: 0
VOMITING: 0
EYE DISCHARGE: 0
NUMBNESS: 0
NAUSEA: 0
FATIGUE: 1
PALPITATIONS: 0
TROUBLE SWALLOWING: 0
NECK PAIN: 0
DIZZINESS: 1
PHOTOPHOBIA: 0
SINUS PAIN: 0
FLANK PAIN: 0
DIFFICULTY URINATING: 0
BACK PAIN: 0
COLOR CHANGE: 0
POLYPHAGIA: 0
POLYDIPSIA: 0
BLOOD IN STOOL: 0
NECK STIFFNESS: 0
SPEECH DIFFICULTY: 0
CHEST TIGHTNESS: 0
CHOKING: 0
EYE PAIN: 0
CONFUSION: 0
APPETITE CHANGE: 0
ABDOMINAL DISTENTION: 0
FEVER: 0
SLEEP DISTURBANCE: 0
CONSTIPATION: 0
HALLUCINATIONS: 0
UNEXPECTED WEIGHT CHANGE: 0
APNEA: 0
SORE THROAT: 0
EYE ITCHING: 0
DIARRHEA: 0
DYSURIA: 0
WEAKNESS: 0
COUGH: 0

## 2024-06-27 ASSESSMENT — LIFESTYLE VARIABLES
TOTAL SCORE: 0
HOW OFTEN DO YOU HAVE 6 OR MORE DRINKS ON ONE OCCASION: NEVER
EVER FELT BAD OR GUILTY ABOUT YOUR DRINKING: NO
EVER HAD A DRINK FIRST THING IN THE MORNING TO STEADY YOUR NERVES TO GET RID OF A HANGOVER: NO
HOW MANY STANDARD DRINKS CONTAINING ALCOHOL DO YOU HAVE ON A TYPICAL DAY: PATIENT DOES NOT DRINK
AUDIT-C TOTAL SCORE: 0
HOW OFTEN DO YOU HAVE A DRINK CONTAINING ALCOHOL: NEVER
HAVE PEOPLE ANNOYED YOU BY CRITICIZING YOUR DRINKING: NO
SKIP TO QUESTIONS 9-10: 1
HAVE YOU EVER FELT YOU SHOULD CUT DOWN ON YOUR DRINKING: NO
AUDIT-C TOTAL SCORE: 0

## 2024-06-27 ASSESSMENT — PAIN SCALES - GENERAL
PAINLEVEL_OUTOF10: 0 - NO PAIN
PAINLEVEL_OUTOF10: 0 - NO PAIN

## 2024-06-27 ASSESSMENT — PAIN - FUNCTIONAL ASSESSMENT
PAIN_FUNCTIONAL_ASSESSMENT: 0-10

## 2024-06-27 ASSESSMENT — ACTIVITIES OF DAILY LIVING (ADL)
DRESSING YOURSELF: INDEPENDENT
JUDGMENT_ADEQUATE_SAFELY_COMPLETE_DAILY_ACTIVITIES: YES
ADEQUATE_TO_COMPLETE_ADL: YES
WALKS IN HOME: INDEPENDENT
HEARING - LEFT EAR: FUNCTIONAL
BATHING: INDEPENDENT
HEARING - RIGHT EAR: FUNCTIONAL
PATIENT'S MEMORY ADEQUATE TO SAFELY COMPLETE DAILY ACTIVITIES?: YES
GROOMING: INDEPENDENT
TOILETING: INDEPENDENT
FEEDING YOURSELF: INDEPENDENT

## 2024-06-27 ASSESSMENT — COGNITIVE AND FUNCTIONAL STATUS - GENERAL
PATIENT BASELINE BEDBOUND: NO
DAILY ACTIVITIY SCORE: 24
MOBILITY SCORE: 24

## 2024-06-27 ASSESSMENT — COLUMBIA-SUICIDE SEVERITY RATING SCALE - C-SSRS
6. HAVE YOU EVER DONE ANYTHING, STARTED TO DO ANYTHING, OR PREPARED TO DO ANYTHING TO END YOUR LIFE?: NO
1. IN THE PAST MONTH, HAVE YOU WISHED YOU WERE DEAD OR WISHED YOU COULD GO TO SLEEP AND NOT WAKE UP?: NO
2. HAVE YOU ACTUALLY HAD ANY THOUGHTS OF KILLING YOURSELF?: NO

## 2024-06-27 ASSESSMENT — PATIENT HEALTH QUESTIONNAIRE - PHQ9
SUM OF ALL RESPONSES TO PHQ9 QUESTIONS 1 & 2: 0
1. LITTLE INTEREST OR PLEASURE IN DOING THINGS: NOT AT ALL
2. FEELING DOWN, DEPRESSED OR HOPELESS: NOT AT ALL

## 2024-06-27 NOTE — PROGRESS NOTES
Juan Varela is a 74 y.o. male admitted for Hypoglycemia. Pharmacy reviewed the patient's [id number] medications and allergies for accuracy.    The list below reflects the PTA list prior to pharmacy medication history. A summary a changes to the PTA medication list has been listed below. Please review each medication in order reconciliation for additional clarification and justification.    Source of information:  PATIENT    Medications added:    Medications modified:    Medications to be removed:    Medications of concern:      Prior to Admission Medications   Prescriptions Last Dose Informant Patient Reported? Taking?   atorvastatin (Lipitor) 40 mg tablet   No No   Sig: TAKE 1 TABLET BY MOUTH EVERYDAY AT BEDTIME   empagliflozin (Jardiance) 25 mg   No No   Sig: Take 1 tablet (25 mg) by mouth once daily.   glipiZIDE (Glucotrol) 10 mg tablet   No No   Sig: TAKE 1 TABLET BY MOUTH TWICE A DAY   insulin glargine (Lantus U-100 Insulin) 100 unit/mL injection   No No   Sig: Inject 50 Units under the skin once daily at bedtime.   levothyroxine (Synthroid, Levoxyl) 150 mcg tablet   No No   Sig: TAKE 1 TABLET BY MOUTH EVERY DAY AS DIRECTED   lisinopril 2.5 mg tablet   No No   Sig: TAKE 1 TABLET BY MOUTH ONCE DAILY.   metFORMIN XR (Glucophage-XR) 750 mg 24 hr tablet   No No   Sig: TAKE 1 TABLET BY MOUTH TWICE A DAY   pioglitazone (Actos) 45 mg tablet   No No   Sig: TAKE 1 TABLET (45 MG) BY MOUTH ONCE DAILY   sotalol (Betapace) 120 mg tablet   No No   Sig: TAKE 1/2 TABLET (60 MG) BY MOUTH 2 TIMES A DAY.      Facility-Administered Medications: None       Brooklyn Mcconnell

## 2024-06-27 NOTE — CONSULTS
[facility]    Patient Name: Juan Varela  Patient : 1950  Room/Bed: 2331/2331-A    Date: 24  Time: 3:05 PM    Referred by Dr. Soares ref. provider found for Hypoglycemia     History Of Present Illness:    Juan Varela is a 74 y.o. male who presented to the emergency department after a near syncopal episode.  Patient states that early this morning he went to use the toilet to have a bowel movement and shortly thereafter became very diaphoretic, lightheaded/weak, confused, and nearly lost consciousness.  Patient states that his symptoms lasted in total for 20 to 30 minutes.  Family called 911 and gave him some sublingual glucose with improvement of his symptoms.  BMP shows a serum sodium 139, serum potassium of 4.3, serum creatinine of 0.99, serum magnesium was 1.84, troponin was 5-4, CBC showed a hemoglobin of 13.6.  Chest x-ray showed no acute findings.  ECG showed sinus rhythm with a heart rate of 67 bpm and a QTc of 513 ms.  Echocardiogram done in 2022 showed normal left ventricular systolic function, low normal right ventricular systolic function, no significant valve abnormalities.  During my exam patient was resting comfortably in bed.    Assessment/Plan:   1.  Near syncope  The patient had a near syncopal episode questionably related to hypoglycemia as symptoms improved after receiving glucose.  As patient was on the toilet having a bowel movement vasovagal episode also possible.  Will update echocardiogram.  Continue to monitor on telemetry.    2.  Paroxysmal atrial fibrillation/prolonged QTc  The patient has a history of paroxysmal atrial fibrillation and is on sotalol to reduce his burden of atrial fibrillation.  ECG showing sinus rhythm with a heart rate of 67 bpm and a QTc of 513 ms.  Would continue current dose of sotalol for now.  Recheck ECG in the morning.  Update echocardiogram.  Patient not currently on oral anticoagulation for unclear  reasons.  He does have an elevated CHADS-VASc score placing him at increased risk for thromboembolic complications.  I would recommend long-term oral anticoagulation.  Will defer final decision for initiation of oral anticoagulation to his primary cardiologist.    3.  Dyslipidemia  Continue statin therapy    4.  Diabetes mellitus  Patient presented with a near syncopal episode possibly related to hypoglycemia  Endocrinology consulted for additional recommendations.    Past Medical History:  He has a past medical history of Atrial fibrillation (Multi), Diabetes mellitus (Multi), and Hypertension.    Past Surgical History:  He has a past surgical history that includes Other surgical history (09/02/2021).      Social History:  He reports that he has never smoked. He has never been exposed to tobacco smoke. He has never used smokeless tobacco. He reports that he does not drink alcohol and does not use drugs.    Family History:  Family History   Family history unknown: Yes        Allergies:  Patient has no known allergies.    Outpatient Medications:  Current Outpatient Medications   Medication Instructions    atorvastatin (LIPITOR) 40 mg, oral, Nightly    empagliflozin (JARDIANCE) 25 mg, oral, Daily    glipiZIDE (GLUCOTROL) 10 mg, oral, 2 times daily    Lantus U-100 Insulin 50 Units, subcutaneous, Nightly    levothyroxine (SYNTHROID, LEVOXYL) 150 mcg, oral, Daily, as directed    lisinopril 2.5 mg, oral, Daily    metFORMIN XR (GLUCOPHAGE-XR) 750 mg, oral, 2 times daily    pioglitazone (ACTOS) 45 mg, oral, Daily    sotalol (Betapace) 120 mg tablet TAKE 1/2 TABLET (60 MG) BY MOUTH 2 TIMES A DAY.        ROS:  A 14 point review of systems was done and is negative other than as stated in HPI    Vitals:  Vitals:    06/27/24 1230 06/27/24 1300 06/27/24 1400 06/27/24 1431   BP: 165/83 152/82 140/75 143/72   BP Location:    Left arm   Patient Position:    Sitting   Pulse: 63 65 74 74   Resp: 16 17 17 20   Temp:    35.5 °C (95.9  °F)   TempSrc:    Temporal   SpO2: 100% 100% 98% 100%   Weight:       Height:           Physical Exam:     Constitutional: Cooperative, in no acute distress, alert, appears stated age.  Skin: Skin color, texture, turgor normal. No rashes or lesions.  Head: Normocephalic. No masses, lesions, tenderness or abnormalities  Eyes: Extraocular movements are grossly intact.  Mouth and throat: Mucous membranes moist  Neck: Neck supple, no carotid bruits, no JVD  Respiratory: Lungs clear to auscultation, no wheezing or rhonchi, no use of accessory muscles  Chest wall: No scars, normal excursion with respiration  Cardiovascular: Regular rhythm without murmur  Gastrointestinal: Abdomen soft, nontender. Bowel sounds normal.  Musculoskeletal: Strength equal in upper extremities  Extremities: No pitting edema  Neurologic: Sensation grossly intact, alert and oriented ×3    Intake/Output:   No intake/output data recorded.    Outpatient Medications  No current facility-administered medications on file prior to encounter.     Current Outpatient Medications on File Prior to Encounter   Medication Sig Dispense Refill    atorvastatin (Lipitor) 40 mg tablet TAKE 1 TABLET BY MOUTH EVERYDAY AT BEDTIME 90 tablet 2    empagliflozin (Jardiance) 25 mg Take 1 tablet (25 mg) by mouth once daily. 30 tablet 5    glipiZIDE (Glucotrol) 10 mg tablet TAKE 1 TABLET BY MOUTH TWICE A  tablet 2    insulin glargine (Lantus U-100 Insulin) 100 unit/mL injection Inject 50 Units under the skin once daily at bedtime. 45 mL 1    levothyroxine (Synthroid, Levoxyl) 150 mcg tablet TAKE 1 TABLET BY MOUTH EVERY DAY AS DIRECTED 90 tablet 1    lisinopril 2.5 mg tablet TAKE 1 TABLET BY MOUTH ONCE DAILY. 90 tablet 1    metFORMIN XR (Glucophage-XR) 750 mg 24 hr tablet TAKE 1 TABLET BY MOUTH TWICE A  tablet 1    pioglitazone (Actos) 45 mg tablet TAKE 1 TABLET (45 MG) BY MOUTH ONCE DAILY 90 tablet 1    sotalol (Betapace) 120 mg tablet TAKE 1/2 TABLET (60 MG) BY  MOUTH 2 TIMES A DAY. 90 tablet 1       Scheduled medications  atorvastatin, 40 mg, oral, Nightly  dextrose, 12.5 g, intravenous, Once  enoxaparin, 40 mg, subcutaneous, q24h  levothyroxine, 150 mcg, oral, Daily  lisinopril, 2.5 mg, oral, Daily  [START ON 6/28/2024] pantoprazole, 40 mg, oral, Daily before breakfast   Or  [START ON 6/28/2024] pantoprazole, 40 mg, intravenous, Daily before breakfast  polyethylene glycol, 17 g, oral, Daily  [Held by provider] sotalol, 60 mg, oral, q12h      Continuous medications  dextrose 5 % and lactated Ringer's, 75 mL/hr      PRN medications  PRN medications: dextrose, dextrose, glucagon, glucagon, melatonin   Medications Prior to Admission   Medication Sig Dispense Refill Last Dose    atorvastatin (Lipitor) 40 mg tablet TAKE 1 TABLET BY MOUTH EVERYDAY AT BEDTIME 90 tablet 2     empagliflozin (Jardiance) 25 mg Take 1 tablet (25 mg) by mouth once daily. 30 tablet 5     glipiZIDE (Glucotrol) 10 mg tablet TAKE 1 TABLET BY MOUTH TWICE A  tablet 2     insulin glargine (Lantus U-100 Insulin) 100 unit/mL injection Inject 50 Units under the skin once daily at bedtime. 45 mL 1     levothyroxine (Synthroid, Levoxyl) 150 mcg tablet TAKE 1 TABLET BY MOUTH EVERY DAY AS DIRECTED 90 tablet 1     lisinopril 2.5 mg tablet TAKE 1 TABLET BY MOUTH ONCE DAILY. 90 tablet 1     metFORMIN XR (Glucophage-XR) 750 mg 24 hr tablet TAKE 1 TABLET BY MOUTH TWICE A  tablet 1     pioglitazone (Actos) 45 mg tablet TAKE 1 TABLET (45 MG) BY MOUTH ONCE DAILY 90 tablet 1     sotalol (Betapace) 120 mg tablet TAKE 1/2 TABLET (60 MG) BY MOUTH 2 TIMES A DAY. 90 tablet 1        Recent Labs: (past 2 days)  Recent Results (from the past 48 hour(s))   POCT GLUCOSE    Collection Time: 06/27/24 11:59 AM   Result Value Ref Range    POCT Glucose 90 74 - 99 mg/dL   CBC and Auto Differential    Collection Time: 06/27/24 12:18 PM   Result Value Ref Range    WBC 10.2 4.4 - 11.3 x10*3/uL    nRBC 0.0 0.0 - 0.0 /100 WBCs     RBC 5.00 4.50 - 5.90 x10*6/uL    Hemoglobin 13.6 13.5 - 17.5 g/dL    Hematocrit 41.1 41.0 - 52.0 %    MCV 82 80 - 100 fL    MCH 27.2 26.0 - 34.0 pg    MCHC 33.1 32.0 - 36.0 g/dL    RDW 13.3 11.5 - 14.5 %    Platelets 188 150 - 450 x10*3/uL    Neutrophils % 81.4 40.0 - 80.0 %    Immature Granulocytes %, Automated 0.4 0.0 - 0.9 %    Lymphocytes % 9.2 13.0 - 44.0 %    Monocytes % 6.8 2.0 - 10.0 %    Eosinophils % 1.7 0.0 - 6.0 %    Basophils % 0.5 0.0 - 2.0 %    Neutrophils Absolute 8.31 (H) 1.60 - 5.50 x10*3/uL    Immature Granulocytes Absolute, Automated 0.04 0.00 - 0.50 x10*3/uL    Lymphocytes Absolute 0.94 0.80 - 3.00 x10*3/uL    Monocytes Absolute 0.69 0.05 - 0.80 x10*3/uL    Eosinophils Absolute 0.17 0.00 - 0.40 x10*3/uL    Basophils Absolute 0.05 0.00 - 0.10 x10*3/uL   Phosphorus    Collection Time: 06/27/24 12:18 PM   Result Value Ref Range    Phosphorus 2.9 2.5 - 4.9 mg/dL   Magnesium    Collection Time: 06/27/24 12:18 PM   Result Value Ref Range    Magnesium 1.84 1.60 - 2.40 mg/dL   Comprehensive metabolic panel    Collection Time: 06/27/24 12:18 PM   Result Value Ref Range    Glucose 119 (H) 74 - 99 mg/dL    Sodium 139 136 - 145 mmol/L    Potassium 4.3 3.5 - 5.3 mmol/L    Chloride 107 98 - 107 mmol/L    Bicarbonate 27 21 - 32 mmol/L    Anion Gap 9 (L) 10 - 20 mmol/L    Urea Nitrogen 18 6 - 23 mg/dL    Creatinine 0.99 0.50 - 1.30 mg/dL    eGFR 79 >60 mL/min/1.73m*2    Calcium 8.6 8.6 - 10.3 mg/dL    Albumin 4.1 3.4 - 5.0 g/dL    Alkaline Phosphatase 70 33 - 136 U/L    Total Protein 6.6 6.4 - 8.2 g/dL    AST 17 9 - 39 U/L    Bilirubin, Total 0.5 0.0 - 1.2 mg/dL    ALT 15 10 - 52 U/L   Troponin I, High Sensitivity, Initial    Collection Time: 06/27/24 12:18 PM   Result Value Ref Range    Troponin I, High Sensitivity 5 0 - 20 ng/L   ECG 12 lead    Collection Time: 06/27/24 12:20 PM   Result Value Ref Range    Ventricular Rate 67 BPM    Atrial Rate 68 BPM    MA Interval 129 ms    QRS Duration 98 ms    QT Interval  486 ms    QTC Calculation(Bazett) 513 ms    P Axis 30 degrees    R Axis -31 degrees    T Axis 36 degrees    QRS Count 10 beats    Q Onset 251 ms    T Offset 494 ms    QTC Fredericia 504 ms   POCT GLUCOSE    Collection Time: 06/27/24  1:03 PM   Result Value Ref Range    POCT Glucose 79 74 - 99 mg/dL   Troponin, High Sensitivity, 1 Hour    Collection Time: 06/27/24  1:09 PM   Result Value Ref Range    Troponin I, High Sensitivity 4 0 - 20 ng/L   POCT GLUCOSE    Collection Time: 06/27/24  1:52 PM   Result Value Ref Range    POCT Glucose 98 74 - 99 mg/dL       CV Studies:  EKG:  Encounter Date: 06/27/24   ECG 12 lead   Result Value    Ventricular Rate 67    Atrial Rate 68    VT Interval 129    QRS Duration 98    QT Interval 486    QTC Calculation(Bazett) 513    P Axis 30    R Axis -31    T Axis 36    QRS Count 10    Q Onset 251    T Offset 494    QTC Fredericia 504    Narrative    Pacemaker spikes or artifacts  Sinus rhythm  Left axis deviation  Prolonged QT interval  Artifact in lead(s) I,II,aVR,aVL,aVF,V1     Echocardiogram:   [place] [address]  Phone [phone] Fax [phone]    TRANSTHORACIC ECHOCARDIOGRAM REPORT      Patient Name:     BARON COOK Reading Physician:   [id number] Martín Huang DO  Study Date:       8/30/2022        Referring Physician: [id number] TRACY  MRN/PID:          [id number]         PCP:  Accession/Order#: [id number]     Department Location: [facility]  YOB: 1950        Fellow:  Gender:           M                Nurse:  Admit Date:                        Sonographer:         Julissa Alamo  Admission Status: Outpatient       Additional Staff:  Height:           195.58 cm        CC Report to:  Weight:           113.40 kg        Study Type:          Echocardiogram  BSA:              2.46 m2  Blood Pressure: 124 /86 mmHg    Diagnosis/ICD: I48.91-Unspecified atrial  fibrillation  Indication:    Afib, Hyperlipidemia  Procedure/CPT: Echo Complete w Full Doppler-[id number]  Study Detail: The following Echo studies were performed: 2D, M-Mode, Doppler and  color flow.      PHYSICIAN INTERPRETATION:  Left Ventricle: Left ventricular systolic function is normal. There are no regional wall motion abnormalities. The left ventricular cavity size is normal. Spectral Doppler shows an impaired relaxation pattern of left ventricular diastolic filling.  Left Atrium: The left atrium is normal in size.  Right Ventricle: The right ventricle is moderately enlarged. There is low normal right ventricular systolic function.  Right Atrium: The right atrium is upper limits of normal in size.  Aortic Valve: The aortic valve is probably trileaflet. There is trace to mild aortic valve regurgitation. The peak instantaneous gradient of the aortic valve is 3.8 mmHg. The mean gradient of the aortic valve is 3.0 mmHg.  Mitral Valve: The mitral valve is normal in structure. There is no evidence of mitral valve regurgitation.  Tricuspid Valve: The tricuspid valve is structurally normal. No evidence of tricuspid regurgitation.  Pulmonic Valve: The pulmonic valve is not well visualized. There is no indication of pulmonic valve regurgitation.  Pericardium: There is no pericardial effusion noted.  Aorta: The aortic root is normal. There is mild dilatation of the ascending aorta. Based of AD/height < 2.43 cm/m indicated lower risk of dissection.      CONCLUSIONS:  1. Left ventricular systolic function is normal.  2. Spectral Doppler shows an impaired relaxation pattern of left ventricular diastolic filling.  3. Moderately enlarged right ventricle.  4. There is low normal right ventricular systolic function.    QUANTITATIVE DATA SUMMARY:  2D MEASUREMENTS:  Normal Ranges:  Ao Root d:     3.50 cm   (2.0-3.7cm)  LAs:           3.00 cm   (2.7-4.0cm)  IVSd:          1.10 cm   (0.6-1.1cm)  LVPWd:         1.10 cm    (0.6-1.1cm)  LVIDd:         3.30 cm   (3.9-5.9cm)  LVIDs:         2.60 cm  LV Mass Index: 44.4 g/m2  LV % FS        21.2 %    LA VOLUME:  Normal Ranges:  LA Vol A4C:        25.2 ml    (22+/-6mL/m2)  LA Vol A2C:        29.2 ml  LA Vol BP:         27.6 ml  LA Vol Index A4C:  10.2ml/m2  LA Vol Index A2C:  11.9 ml/m2  LA Vol Index BP:   11.2 ml/m2  LA Area A4C:       11.6 cm2  LA Area A2C:       12.3 cm2  LA Major Axis A4C: 4.5 cm  LA Major Axis A2C: 4.4 cm  LA Volume Index:   10.6 ml/m2  LA Vol A4C:        24.0 ml  LA Vol A2C:        28.0 ml    RA VOLUME BY A/L METHOD:  Normal Ranges:  RA Area A4C: 19.2 cm2    AORTA MEASUREMENTS:  Normal Ranges:  Ao Sinus, d: 3.50 cm (2.1-3.5cm)  Ao STJ, d:   3.07 cm (1.7-3.4cm)  Asc Ao, d:   4.20 cm (2.1-3.4cm)    LV SYSTOLIC FUNCTION BY 2D PLANIMETRY (MOD):  Normal Ranges:  EF-A4C View: 58.5 % (>55%)  EF-A2C View: 61.3 %  EF-Biplane:  59.4 %    LV DIASTOLIC FUNCTION:  Normal Ranges:  MV Peak E:    0.44 m/s    (0.7-1.2 m/s)  MV Peak A:    0.70 m/s    (0.42-0.7 m/s)  E/A Ratio:    0.64        (1.0-2.2)  MV e'         0.06 m/s    (>8.0)  MV lateral e' 0.06 m/s  MV medial e'  0.06 m/s  MV A Dur:     187.00 msec  E/e' Ratio:   7.40        (<8.0)    MITRAL VALVE:  Normal Ranges:  MV DT: 331 msec (150-240msec)    AORTIC VALVE:  Normal Ranges:  AoV Vmax:                0.98 m/s (<1.7m/s)  AoV Peak PG:             3.8 mmHg (<20mmHg)  AoV Mean PG:             3.0 mmHg (1.7-11.5mmHg)  LVOT Max Melchor:            0.68 m/s (<1.1m/s)  AoV VTI:                 23.80 cm (18-25cm)  LVOT VTI:                14.30 cm  LVOT Diameter:           2.10 cm  (1.8-2.4cm)  AoV Area, VTI:           2.08 cm2 (2.5-5.5cm2)  AoV Area,Vmax:           2.42 cm2 (2.5-4.5cm2)  AoV Dimensionless Index: 0.60    RIGHT VENTRICLE:  RV 1   4.44 cm  RV 2   3.12 cm  RV 3   6.80 cm  TAPSE: 22.9 mm  RV s'  0.12 m/s    TRICUSPID VALVE/RVSP:  Normal Ranges:  TV E Vmax: 0.40 m/s (0.3-0.7m/s)  TV A Vmax: 0.36 m/s  IVC Diam:  1.48  cm      [id number] Martín Huang DO  Electronically signed on 8/30/2022 at 12:23:41 PM         Final     Stress Testing IMGRESULT([id number]:1:1825): No results found for this or any previous visit from the past 1825 days.    Cardiac Catheterization: No results found for this or any previous visit from the past 1825 days.  No results found for this or any previous visit from the past 3650 days.     Cardiac Scoring: No results found for this or any previous visit from the past 1825 days.    AAA : No results found for this or any previous visit from the past 1825 days.    OTHER: No results found for this or any previous visit from the past 1825 days.    LAST IMAGING RESULTS  XR chest 1 view  Narrative: STUDY:  Chest Radiograph;  06/27/2024 12:39PM  INDICATION:  Syncope.  COMPARISON:  None Available  ACCESSION NUMBER(S):  [id number]  ORDERING CLINICIAN:  HELEN MCMAHAN  TECHNIQUE:  Frontal chest was obtained at 12:38 hours.  FINDINGS:  No acute opacities or effusions are visualized.  Heart size is top  normal.  There is no evidence of a pneumothorax.  Impression: No acute findings on single AP view of the chest.  Signed by Aram Diaz MD  ECG 12 lead  Pacemaker spikes or artifacts  Sinus rhythm  Left axis deviation  Prolonged QT interval  Artifact in lead(s) I,II,aVR,aVL,aVF,V1        Martín Huang DO, FACC, FACOI  6/27/2024  3:05 PM

## 2024-06-27 NOTE — H&P
History Obtained From: Patient and family    History Of Present Illness:  Juan Varela is a 74 y.o. male with PMHx s/f type 2 diabetes atrial fibrillation hypertension presenting with upper glycemia.  Patient has longstanding history of insulin-dependent diabetes managed with Lantus and a combination of oral medications including glipizide.  The patient had been in his usual routine went to his primary care provider ate and drank normally went to bed has been doing very well woke up this morning followed his normal routine taking his Lantus and other oral medications he ate his breakfast.  Patient subsequently went to the bathroom on the toilet he became very diaphoretic had headache very shaky and weak he did not lose consciousness but family reports he is very sluggish in his mentation.  The family called 911 they again came and gave him some sublingual glucose patient improved and mentation came into the emergency department, Vital signs on presentation showed temperature 96.8 heart rate 64 respiratory rate 20 blood pressure 167/89 SpO2 99% on room air.  Lab work showed patient with glucose 119 anion gap 9 remainder of his chemistry panel is within normal limits.  CBC shows WBC 10.2 hemoglobin 13.6 hematocrit 41.1 platelets 188 most recent fingerstick glucose is 79.  On entering the room the patient is found alert eating well he denies any recent fevers chills any nausea any diarrhea any urinary symptoms no cough or hemoptysis.  The patient denies a syncopal episode.  He does state he has some history of feeling off balance or lightheaded in the past but had some adjustments of his medications including the addition of lisinopril and has felt much better.  He did not experience any palpitations or chest pain.        ED Course:  ED Course as of 06/27/24 1337   Thu Jun 27, 2024   1217 Endocrinology note from this week was reviewed showing no changes to medications [JH]   1218 POCT Glucose: 90 [JH]   1218  Patient presents with hypoglycemia and syncope. Available chart reviewed. On initial assessment the patient was found non-toxic, no acute distress, vitals hemodynamically stable and afebrile. Initial concern for arrhythmia, MI, ACS, electrolyte abnormality.  Given the patient's on sulfonylurea and experience hypoglycemia and syncope he will need admission for monitoring. []   1236 EKG obtained at 1224 and interpreted by myself shows sinus rhythm, rate 67, OK is 129, QRS is 98 and prolonged QTc of 513, flattening of the T waves, no ST segment changes []   1236 CBC and Auto Differential(!)  CBC shows no leukocytosis, no anemia, no thrombocytopenia []   1242 PHOSPHORUS: 2.9 [JH]   1242 MAGNESIUM: 1.84 [JH]   1242 Comprehensive metabolic panel(!)  Metabolic panel shows glucose of 120 after oral glucose, no Amber abnormalities, normal kidney and liver function []      ED Course User Index  [] Luis Carlos Pruett MD         Diagnoses as of 06/27/24 1337   Hypoglycemia   Syncope and collapse     Relevant Results  Results for orders placed or performed during the hospital encounter of 06/27/24 (from the past 24 hour(s))   POCT GLUCOSE   Result Value Ref Range    POCT Glucose 90 74 - 99 mg/dL   CBC and Auto Differential   Result Value Ref Range    WBC 10.2 4.4 - 11.3 x10*3/uL    nRBC 0.0 0.0 - 0.0 /100 WBCs    RBC 5.00 4.50 - 5.90 x10*6/uL    Hemoglobin 13.6 13.5 - 17.5 g/dL    Hematocrit 41.1 41.0 - 52.0 %    MCV 82 80 - 100 fL    MCH 27.2 26.0 - 34.0 pg    MCHC 33.1 32.0 - 36.0 g/dL    RDW 13.3 11.5 - 14.5 %    Platelets 188 150 - 450 x10*3/uL    Neutrophils % 81.4 40.0 - 80.0 %    Immature Granulocytes %, Automated 0.4 0.0 - 0.9 %    Lymphocytes % 9.2 13.0 - 44.0 %    Monocytes % 6.8 2.0 - 10.0 %    Eosinophils % 1.7 0.0 - 6.0 %    Basophils % 0.5 0.0 - 2.0 %    Neutrophils Absolute 8.31 (H) 1.60 - 5.50 x10*3/uL    Immature Granulocytes Absolute, Automated 0.04 0.00 - 0.50 x10*3/uL    Lymphocytes Absolute 0.94 0.80 -  3.00 x10*3/uL    Monocytes Absolute 0.69 0.05 - 0.80 x10*3/uL    Eosinophils Absolute 0.17 0.00 - 0.40 x10*3/uL    Basophils Absolute 0.05 0.00 - 0.10 x10*3/uL   Phosphorus   Result Value Ref Range    Phosphorus 2.9 2.5 - 4.9 mg/dL   Magnesium   Result Value Ref Range    Magnesium 1.84 1.60 - 2.40 mg/dL   Comprehensive metabolic panel   Result Value Ref Range    Glucose 119 (H) 74 - 99 mg/dL    Sodium 139 136 - 145 mmol/L    Potassium 4.3 3.5 - 5.3 mmol/L    Chloride 107 98 - 107 mmol/L    Bicarbonate 27 21 - 32 mmol/L    Anion Gap 9 (L) 10 - 20 mmol/L    Urea Nitrogen 18 6 - 23 mg/dL    Creatinine 0.99 0.50 - 1.30 mg/dL    eGFR 79 >60 mL/min/1.73m*2    Calcium 8.6 8.6 - 10.3 mg/dL    Albumin 4.1 3.4 - 5.0 g/dL    Alkaline Phosphatase 70 33 - 136 U/L    Total Protein 6.6 6.4 - 8.2 g/dL    AST 17 9 - 39 U/L    Bilirubin, Total 0.5 0.0 - 1.2 mg/dL    ALT 15 10 - 52 U/L   Troponin I, High Sensitivity, Initial   Result Value Ref Range    Troponin I, High Sensitivity 5 0 - 20 ng/L   ECG 12 lead   Result Value Ref Range    Ventricular Rate 67 BPM    Atrial Rate 68 BPM    AR Interval 129 ms    QRS Duration 98 ms    QT Interval 486 ms    QTC Calculation(Bazett) 513 ms    P Axis 30 degrees    R Axis -31 degrees    T Axis 36 degrees    QRS Count 10 beats    Q Onset 251 ms    T Offset 494 ms    QTC Fredericia 504 ms   POCT GLUCOSE   Result Value Ref Range    POCT Glucose 79 74 - 99 mg/dL      XR chest 1 view    Result Date: 6/27/2024  STUDY: Chest Radiograph;  06/27/2024 12:39PM INDICATION: Syncope. COMPARISON: None Available ACCESSION NUMBER(S): [id number] ORDERING CLINICIAN: HELEN MCMAHAN TECHNIQUE:  Frontal chest was obtained at 12:38 hours. FINDINGS: No acute opacities or effusions are visualized.  Heart size is top normal.  There is no evidence of a pneumothorax.    No acute findings on single AP view of the chest. Signed by Aram Diaz MD    ECG 12 lead    Result Date: 6/27/2024  Pacemaker spikes or artifacts Sinus  rhythm Left axis deviation Prolonged QT interval Artifact in lead(s) I,II,aVR,aVL,aVF,V1    ECG 12 lead (Clinic Performed)    Result Date: 6/24/2024  See scanned image      Scheduled medications:    Continuous medications:    PRN medications:        Past Medical History  He has a past medical history of Atrial fibrillation (Multi), Diabetes mellitus (Multi), and Hypertension.    Surgical History  He has a past surgical history that includes Other surgical history (09/02/2021).     Social History  He reports that he has never smoked. He has never been exposed to tobacco smoke. He has never used smokeless tobacco. He reports that he does not drink alcohol and does not use drugs.    Family History  Family History   Family history unknown: Yes        Allergies  Patient has no known allergies.    Code Status  No Order     Review of Systems   Constitutional:  Positive for diaphoresis and fatigue. Negative for appetite change, chills, fever and unexpected weight change.   HENT:  Negative for congestion, ear discharge, ear pain, mouth sores, nosebleeds, sinus pain, sore throat, trouble swallowing and voice change.    Eyes:  Negative for photophobia, pain, discharge, itching and visual disturbance.   Respiratory:  Negative for apnea, cough, choking, chest tightness, shortness of breath and wheezing.    Cardiovascular:  Negative for chest pain, palpitations and leg swelling.   Gastrointestinal:  Negative for abdominal distention, abdominal pain, blood in stool, constipation, diarrhea, nausea and vomiting.   Endocrine: Negative for cold intolerance, heat intolerance, polydipsia, polyphagia and polyuria.   Genitourinary:  Negative for decreased urine volume, difficulty urinating, dysuria, flank pain, frequency, hematuria and urgency.   Musculoskeletal:  Negative for arthralgias, back pain, gait problem, myalgias, neck pain and neck stiffness.   Skin:  Negative for color change, pallor and wound.   Allergic/Immunologic: Negative  for food allergies and immunocompromised state.   Neurological:  Positive for dizziness and headaches. Negative for tremors, seizures, syncope, speech difficulty, weakness, light-headedness and numbness.   Hematological:  Negative for adenopathy. Does not bruise/bleed easily.   Psychiatric/Behavioral:  Negative for confusion, dysphoric mood, hallucinations, sleep disturbance and suicidal ideas. The patient is not nervous/anxious.        Last Recorded Vitals  /82   Pulse 65   Temp 36 °C (96.8 °F)   Resp 17   Wt 109 kg (240 lb)   SpO2 100%      Physical Exam  Vitals reviewed.   Constitutional:       General: He is not in acute distress.  HENT:      Head: Normocephalic and atraumatic.      Right Ear: External ear normal.      Left Ear: External ear normal.      Nose: Nose normal.      Mouth/Throat:      Mouth: Mucous membranes are moist.      Pharynx: Oropharynx is clear.   Eyes:      Extraocular Movements: Extraocular movements intact.      Conjunctiva/sclera: Conjunctivae normal.      Pupils: Pupils are equal, round, and reactive to light.   Cardiovascular:      Rate and Rhythm: Normal rate and regular rhythm.      Pulses: Normal pulses.      Heart sounds: Normal heart sounds. No murmur heard.  Pulmonary:      Effort: Pulmonary effort is normal. No respiratory distress.      Breath sounds: Normal breath sounds. No wheezing, rhonchi or rales.   Chest:      Chest wall: No tenderness.   Abdominal:      General: Bowel sounds are normal. There is no distension.      Palpations: Abdomen is soft. There is no mass.      Tenderness: There is no abdominal tenderness. There is no rebound.   Musculoskeletal:         General: No swelling or deformity. Normal range of motion.      Cervical back: Normal range of motion.      Right lower leg: No edema.      Left lower leg: No edema.   Skin:     General: Skin is warm and dry.      Capillary Refill: Capillary refill takes less than 2 seconds.   Neurological:      General:  No focal deficit present.      Mental Status: He is alert and oriented to person, place, and time.   Psychiatric:         Mood and Affect: Mood normal.         Behavior: Behavior normal.         Assessment/Plan   Principal Problem:    Hypoglycemia    Hypoglycemia  Latest glucose check 79 will give patient half amp D50, continue oral intake  Start patient on D5 lactated Ringer's  Hold hypoglycemic medications for now  Monitor glucose checks every 2 hours hypoglycemic protocol  Request endocrinology consultation  Patient has not been checking blood sugars at home will request social work consult to see if we can facilitate getting him a glucometer and strips    Near syncope  Sounds most consistent w hypoglycemia  Will monitor tele, correct hypoglycemia,     Atrial fibrillation on sotalol in sr but Qtc is slightly prolonged  Will monitor on telemetry, Request cardiology evaluate before resuming sotalol          No new Assessment & Plan notes have been filed under this hospital service since the last note was generated.  Service: Internal Medicine          Jacoby Alfaro, APRN-CNP    Dragon dictation software was used to dictate this note and thus there may be minor errors in translation/transcription including garbled speech or misspellings. Please contact for clarification if needed.

## 2024-06-27 NOTE — ED PROVIDER NOTES
HPI   Chief Complaint   Patient presents with   • Hypoglycemia       Patient presents emergency department for hypoglycemia and syncope.  Patient's never had any complications with hypoglycemia in the past.  Just saw his doctors this week.  Reports that his blood sugar for EMS was in the 60s.  Patient believes he did pass out secondary to the hypoglycemia.  States he typically runs 100-1 20 range.  Patient takes multiple medications including Lantus and glipizide that he took this morning.  Patient received oral glucose by EMS and was brought here for further evaluation.  Patient denies any other symptoms.                          [place] Coma Scale Score: 15                     Patient History   Past Medical History:   Diagnosis Date   • Atrial fibrillation (Multi)    • Diabetes mellitus (Multi)    • Hypertension      Past Surgical History:   Procedure Laterality Date   • OTHER SURGICAL HISTORY  09/02/2021    Cholecystectomy     Family History   Family history unknown: Yes     Social History     Tobacco Use   • Smoking status: Never     Passive exposure: Never   • Smokeless tobacco: Never   Vaping Use   • Vaping status: Never Used   Substance Use Topics   • Alcohol use: Never   • Drug use: Never       Physical Exam   ED Triage Vitals [06/27/24 1154]   Temperature Heart Rate Respirations BP   36 °C (96.8 °F) 64 20 167/89      Pulse Ox Temp src Heart Rate Source Patient Position   99 % -- -- --      BP Location FiO2 (%)     -- --       Physical Exam  Vitals and nursing note reviewed.   Constitutional:       General: He is not in acute distress.     Appearance: He is well-developed.   HENT:      Head: Normocephalic and atraumatic.      Right Ear: External ear normal.      Left Ear: External ear normal.      Mouth/Throat:      Mouth: Mucous membranes are moist.      Pharynx: Oropharynx is clear.   Eyes:      Extraocular Movements: Extraocular movements intact.      Conjunctiva/sclera: Conjunctivae normal.   Neck:       Trachea: Trachea normal.   Cardiovascular:      Rate and Rhythm: Normal rate.   Pulmonary:      Effort: Pulmonary effort is normal. No respiratory distress.      Breath sounds: Normal breath sounds.   Abdominal:      General: Bowel sounds are normal.      Palpations: Abdomen is soft.      Tenderness: There is no abdominal tenderness.   Musculoskeletal:         General: No swelling or tenderness.      Cervical back: No tenderness.   Skin:     General: Skin is warm and dry.      Capillary Refill: Capillary refill takes less than 2 seconds.   Neurological:      General: No focal deficit present.      Mental Status: He is alert and oriented to person, place, and time.      Comments:         Psychiatric:         Mood and Affect: Mood normal.         Thought Content: Thought content does not include homicidal or suicidal ideation.         ED Course & MDM   ED Course as of 06/29/24 2127   Thu Jun 27, 2024   1217 Endocrinology note from this week was reviewed showing no changes to medications [JH]   1218 POCT Glucose: 90 [JH]   1218 Patient presents with hypoglycemia and syncope. Available chart reviewed. On initial assessment the patient was found non-toxic, no acute distress, vitals hemodynamically stable and afebrile. Initial concern for arrhythmia, MI, ACS, electrolyte abnormality.  Given the patient's on sulfonylurea and experience hypoglycemia and syncope he will need admission for monitoring. [JH]   1236 EKG obtained at 1224 and interpreted by myself shows sinus rhythm, rate 67, AR is 129, QRS is 98 and prolonged QTc of 513, flattening of the T waves, no ST segment changes [JH]   1236 CBC and Auto Differential(!)  CBC shows no leukocytosis, no anemia, no thrombocytopenia [JH]   1242 PHOSPHORUS: 2.9 [JH]   1242 MAGNESIUM: 1.84 [JH]   1242 Comprehensive metabolic panel(!)  Metabolic panel shows glucose of 120 after oral glucose, no electrolyte abnormalities, normal kidney and liver function [JH]   1255 Pt admitted [JH]       ED Course User Index  [JH] Luis Carlos Pruett MD         Diagnoses as of 06/29/24 2127   Hypoglycemia   Syncope and collapse       Medical Decision Making      Procedure  Procedures     Luis Carlos Pruett MD  06/29/24 2127

## 2024-06-27 NOTE — CARE PLAN
The clinical goals for the shift include bs > 100      Problem: Recent hospitalization or complication while living with DM  Goal: Patient will effectively self-manage their DM disease process  Outcome: Progressing     Problem: Lack of Diabetes disease process knowledge  Goal: Increase knowledge/understanding of diabetes and how to reduce risk of complications  Outcome: Progressing     Problem: Lack of glucose monitoring and target numbers for before and after meals knowledge  Goal: Increase knowledge/understanding of monitoring devices and interpret data to assist with lifestyle change  Outcome: Progressing     Problem: Lack of knowledge on diet for diabetes  Goal: Discover best plan for balanced nutrition to manage diabetes  Outcome: Progressing     Problem: Address barriers to lifestyle change  Goal: Find workable solutions to meet health goals  Outcome: Progressing     Problem: Lack of knowledge on benefits of activity for meeting blood glucose targets and health goals  Goal: Discover best plan for being active and address any barriers  Outcome: Progressing     Problem: Lack of knowldge regarding diabetes medications  Goal: Increase knowledge via education  Outcome: Progressing     Problem: Lack of knowledge on where to find additional support for diabetes  Goal: Increase knowledge of where support can be found to best address patient's need  Outcome: Progressing

## 2024-06-28 ENCOUNTER — APPOINTMENT (OUTPATIENT)
Dept: CARDIOLOGY | Facility: HOSPITAL | Age: 74
End: 2024-06-28
Payer: MEDICARE

## 2024-06-28 VITALS
OXYGEN SATURATION: 99 % | WEIGHT: 240 LBS | HEART RATE: 68 BPM | HEIGHT: 77 IN | TEMPERATURE: 96.3 F | BODY MASS INDEX: 28.34 KG/M2 | SYSTOLIC BLOOD PRESSURE: 166 MMHG | DIASTOLIC BLOOD PRESSURE: 84 MMHG | RESPIRATION RATE: 16 BRPM

## 2024-06-28 LAB
ANION GAP SERPL CALC-SCNC: 10 MMOL/L (ref 10–20)
AORTIC VALVE MEAN GRADIENT: 2 MMHG
AORTIC VALVE PEAK VELOCITY: 0.92 M/S
AV PEAK GRADIENT: 3.4 MMHG
AVA (PEAK VEL): 2.89 CM2
AVA (VTI): 3.34 CM2
BUN SERPL-MCNC: 15 MG/DL (ref 6–23)
CALCIUM SERPL-MCNC: 8.8 MG/DL (ref 8.6–10.3)
CHLORIDE SERPL-SCNC: 106 MMOL/L (ref 98–107)
CO2 SERPL-SCNC: 28 MMOL/L (ref 21–32)
CREAT SERPL-MCNC: 0.87 MG/DL (ref 0.5–1.3)
EGFRCR SERPLBLD CKD-EPI 2021: 89 ML/MIN/1.73M*2
EJECTION FRACTION APICAL 4 CHAMBER: 62.8
EJECTION FRACTION: 62 %
GLUCOSE BLD MANUAL STRIP-MCNC: 123 MG/DL (ref 74–99)
GLUCOSE BLD MANUAL STRIP-MCNC: 130 MG/DL (ref 74–99)
GLUCOSE BLD MANUAL STRIP-MCNC: 138 MG/DL (ref 74–99)
GLUCOSE BLD MANUAL STRIP-MCNC: 175 MG/DL (ref 74–99)
GLUCOSE BLD MANUAL STRIP-MCNC: 187 MG/DL (ref 74–99)
GLUCOSE SERPL-MCNC: 127 MG/DL (ref 74–99)
LEFT ATRIUM VOLUME AREA LENGTH INDEX BSA: 13.6 ML/M2
LEFT VENTRICLE INTERNAL DIMENSION DIASTOLE: 4 CM (ref 3.5–6)
LEFT VENTRICULAR OUTFLOW TRACT DIAMETER: 2.1 CM
LV EJECTION FRACTION BIPLANE: 62 %
MITRAL VALVE E/A RATIO: 1.22
POTASSIUM SERPL-SCNC: 3.9 MMOL/L (ref 3.5–5.3)
RIGHT VENTRICLE FREE WALL PEAK S': 15.2 CM/S
SODIUM SERPL-SCNC: 140 MMOL/L (ref 136–145)
TRICUSPID ANNULAR PLANE SYSTOLIC EXCURSION: 2.5 CM

## 2024-06-28 PROCEDURE — 93306 TTE W/DOPPLER COMPLETE: CPT

## 2024-06-28 PROCEDURE — 36415 COLL VENOUS BLD VENIPUNCTURE: CPT | Performed by: NURSE PRACTITIONER

## 2024-06-28 PROCEDURE — 99222 1ST HOSP IP/OBS MODERATE 55: CPT | Performed by: INTERNAL MEDICINE

## 2024-06-28 PROCEDURE — 2500000001 HC RX 250 WO HCPCS SELF ADMINISTERED DRUGS (ALT 637 FOR MEDICARE OP): Performed by: INTERNAL MEDICINE

## 2024-06-28 PROCEDURE — 80048 BASIC METABOLIC PNL TOTAL CA: CPT | Performed by: NURSE PRACTITIONER

## 2024-06-28 PROCEDURE — 99239 HOSP IP/OBS DSCHRG MGMT >30: CPT | Performed by: HOSPITALIST

## 2024-06-28 PROCEDURE — 2500000001 HC RX 250 WO HCPCS SELF ADMINISTERED DRUGS (ALT 637 FOR MEDICARE OP): Performed by: NURSE PRACTITIONER

## 2024-06-28 PROCEDURE — G0378 HOSPITAL OBSERVATION PER HR: HCPCS

## 2024-06-28 PROCEDURE — 93306 TTE W/DOPPLER COMPLETE: CPT | Performed by: STUDENT IN AN ORGANIZED HEALTH CARE EDUCATION/TRAINING PROGRAM

## 2024-06-28 PROCEDURE — 2500000002 HC RX 250 W HCPCS SELF ADMINISTERED DRUGS (ALT 637 FOR MEDICARE OP, ALT 636 FOR OP/ED): Performed by: INTERNAL MEDICINE

## 2024-06-28 PROCEDURE — 82947 ASSAY GLUCOSE BLOOD QUANT: CPT | Mod: 91

## 2024-06-28 PROCEDURE — 82947 ASSAY GLUCOSE BLOOD QUANT: CPT

## 2024-06-28 RX ORDER — INSULIN GLARGINE 100 [IU]/ML
40 INJECTION, SOLUTION SUBCUTANEOUS
Status: DISCONTINUED | OUTPATIENT
Start: 2024-06-29 | End: 2024-06-28 | Stop reason: HOSPADM

## 2024-06-28 RX ORDER — PIOGLITAZONEHYDROCHLORIDE 30 MG/1
45 TABLET ORAL DAILY
Status: DISCONTINUED | OUTPATIENT
Start: 2024-06-28 | End: 2024-06-28 | Stop reason: HOSPADM

## 2024-06-28 RX ORDER — INSULIN LISPRO 100 [IU]/ML
0-10 INJECTION, SOLUTION INTRAVENOUS; SUBCUTANEOUS
Status: DISCONTINUED | OUTPATIENT
Start: 2024-06-28 | End: 2024-06-28 | Stop reason: HOSPADM

## 2024-06-28 RX ORDER — INSULIN GLARGINE 100 [IU]/ML
40 INJECTION, SOLUTION SUBCUTANEOUS
Start: 2024-06-29

## 2024-06-28 ASSESSMENT — COGNITIVE AND FUNCTIONAL STATUS - GENERAL
DAILY ACTIVITIY SCORE: 24
MOBILITY SCORE: 24

## 2024-06-28 ASSESSMENT — ACTIVITIES OF DAILY LIVING (ADL): LACK_OF_TRANSPORTATION: NO

## 2024-06-28 NOTE — PROGRESS NOTES
06/28/24 1057   Discharge Planning   Living Arrangements Children;Family members   Support Systems Children;Family members   Assistance Needed Independent   Type of Residence Private residence   Home or Post Acute Services None   Patient expects to be discharged to: Home   Does the patient need discharge transport arranged? No   Financial Resource Strain   How hard is it for you to pay for the very basics like food, housing, medical care, and heating? Not hard   Housing Stability   In the last 12 months, was there a time when you were not able to pay the mortgage or rent on time? N   In the last 12 months, was there a time when you did not have a steady place to sleep or slept in a shelter (including now)? N   Transportation Needs   In the past 12 months, has lack of transportation kept you from medical appointments or from getting medications? no   In the past 12 months, has lack of transportation kept you from meetings, work, or from getting things needed for daily living? No     PCP is Roseanna Durbin DO. Patient is from home with his son and grandson. Patient is independent with ambulation, self care, driving, shopping, and meals.  Patient plans to return home with no needs upon discharge. [facility] will continue to follow for needs if they arise.

## 2024-06-28 NOTE — CONSULTS
Inpatient consult to Endocrinology  Consult performed by: Genesis Mancuso MD  Consult ordered by: Jacoby Alfaro, APRN-CNP  Reason for consult: Hypoglycemia          Reason For Consult  Hypoglycemia     History Of Present Illness  Juan Varela is a 74 y.o. male presenting with hypoglycemia.  He states that he was in his usual state of health yesterday morning.  He went to the bathroom and became diaphoretic on the commode.   His son called EMS and he received sugar.   His mentation subsequently improved.  He was found to be hemodynamically stable.  Initial labs data revealed a glucose value of 119mg/dL.        The patient was last seen in the outpatient setting on June 4, 2024.  He has historically never checked his blood sugars given cost of test strips.       Home Regimen  Lantus 50 units SQ bedtime   Glipizide 10mg twice daily   Pioglitazone 45mg once daily   Metformin 750mg twice daily   Jardiance 25mg once daily      His last A1C was found to be 7.9% as of May 2024.    He is tolerating meals.  He has no complaints this morning.      Past Medical History  He has a past medical history of Atrial fibrillation (Multi), Diabetes mellitus (Multi), and Hypertension.    Surgical History  He has a past surgical history that includes Other surgical history (09/02/2021).     Social History  He reports that he has never smoked. He has never been exposed to tobacco smoke. He has never used smokeless tobacco. He reports that he does not drink alcohol and does not use drugs.    Family History  Family History   Family history unknown: Yes        Allergies  Patient has no known allergies.    Review of Systems   All other systems reviewed and are negative.       Physical Exam  Vitals and nursing note reviewed.   Constitutional:       General: He is not in acute distress.     Appearance: Normal appearance. He is normal weight.   HENT:      Head: Normocephalic and atraumatic.      Nose: Nose normal.       "Mouth/Throat:      Mouth: Mucous membranes are moist.   Eyes:      Extraocular Movements: Extraocular movements intact.   Pulmonary:      Effort: Pulmonary effort is normal.   Musculoskeletal:         General: Normal range of motion.   Neurological:      Mental Status: He is alert and oriented to person, place, and time.   Psychiatric:         Mood and Affect: Mood normal.          Last Recorded Vitals  Blood pressure 167/68, pulse 74, temperature 36.3 °C (97.4 °F), temperature source Temporal, resp. rate 16, height 1.956 m (6' 5\"), weight 109 kg (240 lb), SpO2 98%.    Relevant Results  Scheduled medications  atorvastatin, 40 mg, oral, Nightly  dextrose, 12.5 g, intravenous, Once  enoxaparin, 40 mg, subcutaneous, q24h  insulin regular, 0-10 Units, subcutaneous, With meals & nightly  levothyroxine, 150 mcg, oral, Daily  lisinopril, 2.5 mg, oral, Daily  pantoprazole, 40 mg, oral, Daily before breakfast   Or  pantoprazole, 40 mg, intravenous, Daily before breakfast  polyethylene glycol, 17 g, oral, Daily  sotalol, 60 mg, oral, q12h      Continuous medications     PRN medications  PRN medications: dextrose, dextrose, glucagon, glucagon, melatonin      Results for orders placed or performed during the hospital encounter of 06/27/24 (from the past 96 hour(s))   POCT GLUCOSE   Result Value Ref Range    POCT Glucose 90 74 - 99 mg/dL   CBC and Auto Differential   Result Value Ref Range    WBC 10.2 4.4 - 11.3 x10*3/uL    nRBC 0.0 0.0 - 0.0 /100 WBCs    RBC 5.00 4.50 - 5.90 x10*6/uL    Hemoglobin 13.6 13.5 - 17.5 g/dL    Hematocrit 41.1 41.0 - 52.0 %    MCV 82 80 - 100 fL    MCH 27.2 26.0 - 34.0 pg    MCHC 33.1 32.0 - 36.0 g/dL    RDW 13.3 11.5 - 14.5 %    Platelets 188 150 - 450 x10*3/uL    Neutrophils % 81.4 40.0 - 80.0 %    Immature Granulocytes %, Automated 0.4 0.0 - 0.9 %    Lymphocytes % 9.2 13.0 - 44.0 %    Monocytes % 6.8 2.0 - 10.0 %    Eosinophils % 1.7 0.0 - 6.0 %    Basophils % 0.5 0.0 - 2.0 %    Neutrophils " Absolute 8.31 (H) 1.60 - 5.50 x10*3/uL    Immature Granulocytes Absolute, Automated 0.04 0.00 - 0.50 x10*3/uL    Lymphocytes Absolute 0.94 0.80 - 3.00 x10*3/uL    Monocytes Absolute 0.69 0.05 - 0.80 x10*3/uL    Eosinophils Absolute 0.17 0.00 - 0.40 x10*3/uL    Basophils Absolute 0.05 0.00 - 0.10 x10*3/uL   Phosphorus   Result Value Ref Range    Phosphorus 2.9 2.5 - 4.9 mg/dL   Magnesium   Result Value Ref Range    Magnesium 1.84 1.60 - 2.40 mg/dL   Comprehensive metabolic panel   Result Value Ref Range    Glucose 119 (H) 74 - 99 mg/dL    Sodium 139 136 - 145 mmol/L    Potassium 4.3 3.5 - 5.3 mmol/L    Chloride 107 98 - 107 mmol/L    Bicarbonate 27 21 - 32 mmol/L    Anion Gap 9 (L) 10 - 20 mmol/L    Urea Nitrogen 18 6 - 23 mg/dL    Creatinine 0.99 0.50 - 1.30 mg/dL    eGFR 79 >60 mL/min/1.73m*2    Calcium 8.6 8.6 - 10.3 mg/dL    Albumin 4.1 3.4 - 5.0 g/dL    Alkaline Phosphatase 70 33 - 136 U/L    Total Protein 6.6 6.4 - 8.2 g/dL    AST 17 9 - 39 U/L    Bilirubin, Total 0.5 0.0 - 1.2 mg/dL    ALT 15 10 - 52 U/L   Troponin I, High Sensitivity, Initial   Result Value Ref Range    Troponin I, High Sensitivity 5 0 - 20 ng/L   ECG 12 lead   Result Value Ref Range    Ventricular Rate 67 BPM    Atrial Rate 68 BPM    VA Interval 129 ms    QRS Duration 98 ms    QT Interval 486 ms    QTC Calculation(Bazett) 513 ms    P Axis 30 degrees    R Axis -31 degrees    T Axis 36 degrees    QRS Count 10 beats    Q Onset 251 ms    T Offset 494 ms    QTC Fredericia 504 ms   POCT GLUCOSE   Result Value Ref Range    POCT Glucose 79 74 - 99 mg/dL   Troponin, High Sensitivity, 1 Hour   Result Value Ref Range    Troponin I, High Sensitivity 4 0 - 20 ng/L   POCT GLUCOSE   Result Value Ref Range    POCT Glucose 98 74 - 99 mg/dL   POCT GLUCOSE   Result Value Ref Range    POCT Glucose 222 (H) 74 - 99 mg/dL   POCT GLUCOSE   Result Value Ref Range    POCT Glucose 198 (H) 74 - 99 mg/dL   POCT GLUCOSE   Result Value Ref Range    POCT Glucose 218 (H) 74  - 99 mg/dL   POCT GLUCOSE   Result Value Ref Range    POCT Glucose 151 (H) 74 - 99 mg/dL   POCT GLUCOSE   Result Value Ref Range    POCT Glucose 138 (H) 74 - 99 mg/dL   POCT GLUCOSE   Result Value Ref Range    POCT Glucose 123 (H) 74 - 99 mg/dL   POCT GLUCOSE   Result Value Ref Range    POCT Glucose 130 (H) 74 - 99 mg/dL   Basic metabolic panel   Result Value Ref Range    Glucose 127 (H) 74 - 99 mg/dL    Sodium 140 136 - 145 mmol/L    Potassium 3.9 3.5 - 5.3 mmol/L    Chloride 106 98 - 107 mmol/L    Bicarbonate 28 21 - 32 mmol/L    Anion Gap 10 10 - 20 mmol/L    Urea Nitrogen 15 6 - 23 mg/dL    Creatinine 0.87 0.50 - 1.30 mg/dL    eGFR 89 >60 mL/min/1.73m*2    Calcium 8.8 8.6 - 10.3 mg/dL      XR chest 1 view    Result Date: 6/27/2024  STUDY: Chest Radiograph;  06/27/2024 12:39PM INDICATION: Syncope. COMPARISON: None Available ACCESSION NUMBER(S): [id number] ORDERING CLINICIAN: HELEN MCMAHAN TECHNIQUE:  Frontal chest was obtained at 12:38 hours. FINDINGS: No acute opacities or effusions are visualized.  Heart size is top normal.  There is no evidence of a pneumothorax.    No acute findings on single AP view of the chest. Signed by Aram Diaz MD    ECG 12 lead    Result Date: 6/27/2024  Pacemaker spikes or artifacts Sinus rhythm Left axis deviation Prolonged QT interval Artifact in lead(s) I,II,aVR,aVL,aVF,V1    ECG 12 lead (Clinic Performed)    Result Date: 6/24/2024  See scanned image        Assessment/Plan   IMPRESSION  TYPE 2 DIABETES MELLITUS  Long term insulin use  A1C of 7.9% as of May 2024    Recommendations:  To commence Lantus 40 units subcutaneous every morning  To continue insulin correction scale TID AC   To commence Jardiance 25mg once daily   To commence Pioglitazone 45mg once daily   Diabetic diet as tolerated   Accu-Cheks ACHS    Hypoglycemic protocol   Will continue to follow    Thank you for the courtesy of this consult     Genesis Mancuso MD

## 2024-06-28 NOTE — NURSING NOTE
Order received for discharge to home. Iv d/cd. Home going instructions given to pt and family, stated understanding. Pt ambulated to exit.

## 2024-06-28 NOTE — DISCHARGE SUMMARY
Discharge Summary    Juan Varela     [id number]     6/27/2024 11:54 AM , admit date    6/28/2024, discharge date      .      Discharge Diagnosis:        1.  Hypoglycemia    2.  Near syncope    3.  Atrial fibrillation    4.  Diabetes mellitus insulin requiring    5.  QT prolongation        Problem List Items Addressed This Visit             ICD-10-CM       Cardiac and Vasculature    AF (paroxysmal atrial fibrillation) (Multi) (Chronic) I48.0    Relevant Medications    sotalol (Betapace) tablet 60 mg    Other Relevant Orders    Transthoracic Echo (TTE) Complete (Completed)       ENT    Seasonal allergies J30.2    Relevant Orders    Transthoracic Echo (TTE) Complete (Completed)       Endocrine/Metabolic    Long-term insulin use (Multi) Z79.4    Relevant Orders    Transthoracic Echo (TTE) Complete (Completed)    * (Principal) Hypoglycemia - Primary E16.2    Relevant Orders    Transthoracic Echo (TTE) Complete (Completed)       Genitourinary and Reproductive    PSA elevation (Chronic) R97.20    Relevant Orders    Transthoracic Echo (TTE) Complete (Completed)       Health Encounters    Medicare annual wellness visit, subsequent Z00.00    Relevant Orders    Transthoracic Echo (TTE) Complete (Completed)       Skin    Eczema of hand L30.9    Relevant Orders    Transthoracic Echo (TTE) Complete (Completed)       Symptoms and Signs    Impairment of balance R26.89    Relevant Orders    Transthoracic Echo (TTE) Complete (Completed)    Near syncope R55    Relevant Orders    Transthoracic Echo (TTE) Complete (Completed)     Other Visit Diagnoses         Codes    Syncope and collapse     R55    Relevant Orders    Transthoracic Echo (TTE) Complete (Completed)               Hospital Course:    [age]-year-old  male with notable history of diabetes, atrial fibrillation who presented to the emergency room department with complaint of hypoglycemia.  Patient reported chronic use of sulfonylurea /glipizide as a part of his  medical regiment.He reported feeling very diaphoretic shaky and weak and during episode of hypoglycemia.    The  patient was admitted and treated for hypoglycemia.  Chronic sotalol use prompted consultation to cardiology given QT prolongation.  Was recommended cardiology to continue patient on current dosage of sotalol.  Repeat EKG this a.m. reflected QTc less than 500.  Cardiologist service felt that patient can be discharged home today and continued on his chronic cardiac regiment.  Patient was also seen in consultation by endocrinology.  He was recommended to:To commence Lantus 40 units subcutaneous every morning  To continue insulin correction scale TID AC   To commence Jardiance 25mg once daily   To commence Pioglitazone 45mg once daily.  Glipizide and metformin were stopped.  Recommend outpatient follow-up with gastroenterology patient's    Hypoglycemia resolved patient requested discharge home appeared hemodynamic stable and clinically fit for discharge.  Overall patient appears to responded well to supportive interventions implemented.  Patient was counseled concerning hypoglycemia.    Patient should seek medical attention immediately if condition should worsen, new symptoms develop , no further improvement or recurrence of presenting symptomatology, patient warned.          Progress note on the date of  discharge.    Juan Varela [id number]   Service: Internal Medicine / Hospitalist Date of service: 6/28/2024                                  Full Code           Subjective     History Obtained From: Patient and family     History Of Present Illness:  Juan Varela is a 74 y.o. male with PMHx s/f type 2 diabetes atrial fibrillation hypertension presenting with upper glycemia.  Patient has longstanding history of insulin-dependent diabetes managed with Lantus and a combination of oral medications including glipizide.  The patient had been in his usual routine went to his primary care provider ate and  drank normally went to bed has been doing very well woke up this morning followed his normal routine taking his Lantus and other oral medications he ate his breakfast.  Patient subsequently went to the bathroom on the toilet he became very diaphoretic had headache very shaky and weak he did not lose consciousness but family reports he is very sluggish in his mentation.  The family called 911 they again came and gave him some sublingual glucose patient improved and mentation came into the emergency department, Vital signs on presentation showed temperature 96.8 heart rate 64 respiratory rate 20 blood pressure 167/89 SpO2 99% on room air.  Lab work showed patient with glucose 119 anion gap 9 remainder of his chemistry panel is within normal limits.  CBC shows WBC 10.2 hemoglobin 13.6 hematocrit 41.1 platelets 188 most recent fingerstick glucose is 79.  On entering the room the patient is found alert eating well he denies any recent fevers chills any nausea any diarrhea any urinary symptoms no cough or hemoptysis.  The patient denies a syncopal episode.  He does state he has some history of feeling off balance or lightheaded in the past but had some adjustments of his medications including the addition of lisinopril and has felt much better.  He did not experience any palpitations or chest pain.     6/28..............No reported : chest  pain, headaches, chest pains, nausea, vomiting, diaphoresis, fevers or chills           Review of Systems:   Review of system otherwise negative if not aforementioned above in subjective.     Objective           Physical Exam      Constitutional:       Appearance: Patient appeared in no acute cardiopulmonary distress.     Comments: Patient alert and oriented to person place time and situation.  HEENT:      Head: Normocephalic and atraumatic.Trachea midline      Nose:No observed congestion or rhinorrhea.     Mouth/Throat: Mucous membranes Moist, Trachea appeared  midline.  Eyes:       Extraocular Movements: Extraocular movements intact.      Pupils: Pupils are equal, round, and reactive to light.      Comments: No scleral icterus or conjunctival injection appreciated.   Cardiovascular:      Rate and Rhythm: Normal rate and regular rhythm. No clicks rubs or gallops, normal S1 and S2.No peripheral stigmata of endocarditis appreciated.     Pulmonary:      Lungs appeared clear to auscultation, no adventitious sound appreciated.  Abdominal:      General: Abdomen soft, nontender, active bowel sounds, no involuntary guarding or rebound tenderness appreciated.     Comments: None   Musculoskeletal:       Patient appeared to have full active range of motion for upper and lower extremities, no acute apparent joint deformity appreciated on examination.   No pitting edema or cyanosis appreciated.       Lymphadenopathy:      No appreciable palpable lymphadenopathy  Skin:     General: Skin is warm.      Coloration:  No jaundice     Findings: No abnormal appearing skin rashes or lesions that appeared acute noted on unclothed area of the skin..   Neurological:      General: No focal sensory or motor deficits appreciated, no meningeal signs or dysmetria noted.      Cranial Nerves: Cranial nerves II to XII appearing grossly intact.     Genitals:  Deferred  Psychiatric:         The patient appears to be displaying normal mood and affect at the time of evaluation.     Labs:           Lab Results   Component Value Date     GLUCOSE 127 (H) 06/28/2024     CALCIUM 8.8 06/28/2024      06/28/2024     K 3.9 06/28/2024     CO2 28 06/28/2024      06/28/2024     BUN 15 06/28/2024     CREATININE 0.87 06/28/2024            Lab Results   Component Value Date     WBC 10.2 06/27/2024     HGB 13.6 06/27/2024     HCT 41.1 06/27/2024     MCV 82 06/27/2024      06/27/2024      [unfilled]   [unfilled]   No results found for the last 90 days.                  X-rays/ Images     [unfilled]   Procedure  Component Value Units Date/Time   XR chest 1 view [[id number]] Collected: 06/27/24 1252   Order Status: Completed Updated: 06/27/24 1332   Narrative:     STUDY:  Chest Radiograph;  06/27/2024 12:39PM  INDICATION:  Syncope.  COMPARISON:  None Available  ACCESSION NUMBER(S):  [id number]  ORDERING CLINICIAN:  HELEN MCMAHAN  TECHNIQUE:  Frontal chest was obtained at 12:38 hours.  FINDINGS:  No acute opacities or effusions are visualized.  Heart size is top  normal.  There is no evidence of a pneumothorax.   Impression:     No acute findings on single AP view of the chest.  Signed by Aram Diaz MD                  Medical Problems         Problem List         * (Principal) Hypoglycemia     AF (paroxysmal atrial fibrillation) (Multi) (Chronic)     Type 2 diabetes mellitus (Multi) (Chronic)     Hyperlipidemia (Chronic)     Hypothyroidism (Chronic)     PSA elevation (Chronic)     Medicare annual wellness visit, subsequent     Seasonal allergies     Long-term insulin use (Multi)     Eczema of hand     Impairment of balance     Obesity     Near syncope                  Above medical problems may be reflective of historical medical problems that may have resolved and may not related to acute clinical condition/medical problems.     Clinical impression/plan:       Principal Problem:    Hypoglycemia     Hypoglycemia  Latest glucose check 79 will give patient half amp D50, continue oral intake  Start patient on D5 lactated Ringer's  Hold hypoglycemic medications for now  Monitor glucose checks every 2 hours hypoglycemic protocol  Request endocrinology consultation  Patient has not been checking blood sugars at home will request social work consult to see if we can facilitate getting him a glucometer and strips     Near syncope  Sounds most consistent w hypoglycemia  Will monitor tele, correct hypoglycemia,      Atrial fibrillation on sotalol in sr but Qtc is slightly prolonged  Will monitor on telemetry, Request cardiology  evaluate before resuming sotalol              Disposition/additional care plan/interventions: 6/28/2024     Continue stewardship as per cardiology     Sotalol continue at current dose     Hyperglycemia improved blood glucose 127     Will stop sulfonylurea if okay with endocrinology.     QT prolongation on presentation, improved QTc on a.m. EKG, QTc less than 500           The patient was informed of differential diagnosis , work up , plan of care and possible sequelae of clinical disposition.Patient in agreement with plan of care. Further recommendations forthcoming in accordance with patient's clinical disposition and response to care.     Discharge planning: Hypoglycemia resolved, patient requesting discharge home today.  Patient should seek medical attention immediately if condition should worsen, new symptoms develop , no further improvement or recurrence of presenting symptomatology, patient warned..  Endocrinology and cardiology okay with discharge.     Care time: > 55 mins              Dictation performed with assistance of voice recognition device therefore transcription errors are possible.       Consultants    Endocrinology and Cardiology           Surgeries/Procedures:    None                Your medication list        ASK your doctor about these medications        Instructions Last Dose Given Next Dose Due   atorvastatin 40 mg tablet  Commonly known as: Lipitor      TAKE 1 TABLET BY MOUTH EVERYDAY AT BEDTIME       empagliflozin 25 mg  Commonly known as: Jardiance      Take 1 tablet (25 mg) by mouth once daily.       glipiZIDE 10 mg tablet  Commonly known as: Glucotrol      TAKE 1 TABLET BY MOUTH TWICE A DAY       Lantus U-100 Insulin 100 unit/mL injection  Generic drug: insulin glargine      Inject 50 Units under the skin once daily at bedtime.       levothyroxine 150 mcg tablet  Commonly known as: Synthroid, Levoxyl      TAKE 1 TABLET BY MOUTH EVERY DAY AS DIRECTED       lisinopril 2.5 mg tablet       TAKE 1 TABLET BY MOUTH ONCE DAILY.       metFORMIN  mg 24 hr tablet  Commonly known as: Glucophage-XR      TAKE 1 TABLET BY MOUTH TWICE A DAY       pioglitazone 45 mg tablet  Commonly known as: Actos      TAKE 1 TABLET (45 MG) BY MOUTH ONCE DAILY       sotalol 120 mg tablet  Commonly known as: Betapace      TAKE 1/2 TABLET (60 MG) BY MOUTH 2 TIMES A DAY.                   Disposition;    Patient requested discharge home.  Patient in agreement with discharge.  Patient should seek medical attention immediately if condition should worsen, new symptoms develop , no further improvement or recurrence of presenting symptomatology, patient warned.    Follow-up:    Follow -up with family physician within one week. Follow-up with cardiology and endocrinology recommended.      Discharge Time : 35 mins      Patient should seek medical attention immediately if condition should worsen , presenting symptoms/conditions do not resolve or new symptoms should developed,patient warned.     Dictation performed with assistance of voice recognition device therefore transcription errors are possible.

## 2024-06-28 NOTE — DISCHARGE INSTRUCTIONS
Monitor for hypoglycemia    Patient should seek medical attention immediately if condition should worsen, new symptoms develop , no further improvement or recurrence of presenting symptomatology, patient warned.

## 2024-06-28 NOTE — PROGRESS NOTES
Juan Varela [id number]   Service: Internal Medicine / Hospitalist Date of service: 6/28/2024                          Full Code                    Subjective    History Obtained From: Patient and family     History Of Present Illness:  Juan Varela is a 74 y.o. male with PMHx s/f type 2 diabetes atrial fibrillation hypertension presenting with upper glycemia.  Patient has longstanding history of insulin-dependent diabetes managed with Lantus and a combination of oral medications including glipizide.  The patient had been in his usual routine went to his primary care provider ate and drank normally went to bed has been doing very well woke up this morning followed his normal routine taking his Lantus and other oral medications he ate his breakfast.  Patient subsequently went to the bathroom on the toilet he became very diaphoretic had headache very shaky and weak he did not lose consciousness but family reports he is very sluggish in his mentation.  The family called 911 they again came and gave him some sublingual glucose patient improved and mentation came into the emergency department, Vital signs on presentation showed temperature 96.8 heart rate 64 respiratory rate 20 blood pressure 167/89 SpO2 99% on room air.  Lab work showed patient with glucose 119 anion gap 9 remainder of his chemistry panel is within normal limits.  CBC shows WBC 10.2 hemoglobin 13.6 hematocrit 41.1 platelets 188 most recent fingerstick glucose is 79.  On entering the room the patient is found alert eating well he denies any recent fevers chills any nausea any diarrhea any urinary symptoms no cough or hemoptysis.  The patient denies a syncopal episode.  He does state he has some history of feeling off balance or lightheaded in the past but had some adjustments of his medications including the addition of lisinopril and has felt much better.  He did not experience any palpitations or chest pain.    6/28..............No reported :  chest  pain, headaches, chest pains, nausea, vomiting, diaphoresis, fevers or chills        Review of Systems:   Review of system otherwise negative if not aforementioned above in subjective.    Objective        Physical Exam     Constitutional:       Appearance: Patient appeared in no acute cardiopulmonary distress.     Comments: Patient alert and oriented to person place time and situation.  HEENT:      Head: Normocephalic and atraumatic.Trachea midline      Nose:No observed congestion or rhinorrhea.     Mouth/Throat: Mucous membranes Moist, Trachea appeared  midline.  Eyes:      Extraocular Movements: Extraocular movements intact.      Pupils: Pupils are equal, round, and reactive to light.      Comments: No scleral icterus or conjunctival injection appreciated.   Cardiovascular:      Rate and Rhythm: Normal rate and regular rhythm. No clicks rubs or gallops, normal S1 and S2.No peripheral stigmata of endocarditis appreciated.     Pulmonary:      Lungs appeared clear to auscultation, no adventitious sound appreciated.  Abdominal:      General: Abdomen soft, nontender, active bowel sounds, no involuntary guarding or rebound tenderness appreciated.     Comments: None   Musculoskeletal:       Patient appeared to have full active range of motion for upper and lower extremities, no acute apparent joint deformity appreciated on examination.   No pitting edema or cyanosis appreciated.       Lymphadenopathy:      No appreciable palpable lymphadenopathy  Skin:     General: Skin is warm.      Coloration:  No jaundice     Findings: No abnormal appearing skin rashes or lesions that appeared acute noted on unclothed area of the skin..   Neurological:      General: No focal sensory or motor deficits appreciated, no meningeal signs or dysmetria noted.      Cranial Nerves: Cranial nerves II to XII appearing grossly intact.     Genitals:  Deferred  Psychiatric:         The patient appears to be displaying normal mood and affect  at the time of evaluation.    Labs:     Lab Results   Component Value Date    GLUCOSE 127 (H) 06/28/2024    CALCIUM 8.8 06/28/2024     06/28/2024    K 3.9 06/28/2024    CO2 28 06/28/2024     06/28/2024    BUN 15 06/28/2024    CREATININE 0.87 06/28/2024      Lab Results   Component Value Date    WBC 10.2 06/27/2024    HGB 13.6 06/27/2024    HCT 41.1 06/27/2024    MCV 82 06/27/2024     06/27/2024      [unfilled]   [unfilled]   No results found for the last 90 days.              X-rays/ Images    [unfilled]   Procedure Component Value Units Date/Time   XR chest 1 view [[id number]] Collected: 06/27/24 1252   Order Status: Completed Updated: 06/27/24 1332   Narrative:     STUDY:  Chest Radiograph;  06/27/2024 12:39PM  INDICATION:  Syncope.  COMPARISON:  None Available  ACCESSION NUMBER(S):  [id number]  ORDERING CLINICIAN:  HELEN MCMAHAN  TECHNIQUE:  Frontal chest was obtained at 12:38 hours.  FINDINGS:  No acute opacities or effusions are visualized.  Heart size is top  normal.  There is no evidence of a pneumothorax.   Impression:     No acute findings on single AP view of the chest.  Signed by Aarm Diaz MD             Medical Problems       Problem List       * (Principal) Hypoglycemia    AF (paroxysmal atrial fibrillation) (Multi) (Chronic)    Type 2 diabetes mellitus (Multi) (Chronic)    Hyperlipidemia (Chronic)    Hypothyroidism (Chronic)    PSA elevation (Chronic)    Medicare annual wellness visit, subsequent    Seasonal allergies    Long-term insulin use (Multi)    Eczema of hand    Impairment of balance    Obesity    Near syncope               Above medical problems may be reflective of historical medical problems that may have resolved and may not related to acute clinical condition/medical problems.    Clinical impression/plan:      Principal Problem:    Hypoglycemia     Hypoglycemia  Latest glucose check 79 will give patient half amp D50, continue oral intake  Start patient on  D5 lactated Ringer's  Hold hypoglycemic medications for now  Monitor glucose checks every 2 hours hypoglycemic protocol  Request endocrinology consultation  Patient has not been checking blood sugars at home will request social work consult to see if we can facilitate getting him a glucometer and strips     Near syncope  Sounds most consistent w hypoglycemia  Will monitor tele, correct hypoglycemia,      Atrial fibrillation on sotalol in sr but Qtc is slightly prolonged  Will monitor on telemetry, Request cardiology evaluate before resuming sotalol           Disposition/additional care plan/interventions: 6/28/2024    Continue stewardship as per cardiology    Sotalol continue at current dose    Hyperglycemia improved blood glucose 127    Will stop sulfonylurea if okay with endocrinology.    QT prolongation on presentation, improved QTc on a.m. EKG, QTc less than 500        The patient was informed of differential diagnosis , work up , plan of care and possible sequelae of clinical disposition.Patient in agreement with plan of care. Further recommendations forthcoming in accordance with patient's clinical disposition and response to care.    Discharge planning: Hypoglycemia resolved, patient requesting discharge home today.  Patient should seek medical attention immediately if condition should worsen, new symptoms develop , no further improvement or recurrence of presenting symptomatology, patient warned..  Endocrinology and cardiology okay with discharge.    Care time: > 55 mins           Dictation performed with assistance of voice recognition device therefore transcription errors are possible.

## 2024-06-28 NOTE — PROGRESS NOTES
SW consult placed for financial concerns stating pt having a hard time obtaining glucose strips. NICO discussed with REYNA Gardner PharmD via secure chat regarding resources/assistance available to assist pt, per REYNA Gardner after looking into pt's insurance it shows a co-pay of $0. SW attempted to meet with pt to inform him however pt had already discharged. No other needs identified at this time, SW signing off,  pt and care team aware of SW availability while inpt.     Rogelio Ryder, MSW, LSW ([phone])   Care Transitions

## 2024-06-29 LAB
ATRIAL RATE: 68 BPM
P AXIS: 30 DEGREES
PR INTERVAL: 129 MS
Q ONSET: 251 MS
QRS COUNT: 10 BEATS
QRS DURATION: 98 MS
QT INTERVAL: 486 MS
QTC CALCULATION(BAZETT): 513 MS
QTC FREDERICIA: 504 MS
R AXIS: -31 DEGREES
T AXIS: 36 DEGREES
T OFFSET: 494 MS
VENTRICULAR RATE: 67 BPM

## 2024-07-01 ENCOUNTER — PATIENT OUTREACH (OUTPATIENT)
Dept: CARE COORDINATION | Facility: CLINIC | Age: 74
End: 2024-07-01
Payer: MEDICARE

## 2024-07-01 ENCOUNTER — TELEPHONE (OUTPATIENT)
Dept: PRIMARY CARE | Facility: CLINIC | Age: 74
End: 2024-07-01
Payer: MEDICARE

## 2024-07-01 NOTE — TELEPHONE ENCOUNTER
----- Message from Stacey Bland LPN sent at 7/1/2024 11:10 AM EDT -----  Regarding: TCM  Patient declined to make an appt with PCP during outreach for TCM call. Patient states they prefer to   (have office outreach to schedule at a later time/contact the office on their own time to schedule follow up).    Can you please contact pt to schedule hosp follow up within 14 days of discharge.  Discharge Facility:  [facility]   Discharge Diagnosis: 1.  Hypoglycemia     2.  Near syncope     3.  Atrial fibrillation     4.  Diabetes mellitus insulin requiring     5.  QT prolongation     Admission Date: 6-27-24   Discharge Date: 6-28-24     Thank you

## 2024-07-01 NOTE — PROGRESS NOTES
Discharge Facility: [facility]   Discharge Diagnosis: 1.  Hypoglycemia     2.  Near syncope     3.  Atrial fibrillation     4.  Diabetes mellitus insulin requiring     5.  QT prolongation     Admission Date: 6-27-24   Discharge Date: 6-28-24     PCP Appointment Date: Message routed to office for scheduling     Specialist Appointment Date:    DEC 5 Established Patient with Genesis Mancuso Thursday Dec 5, 2024 10:45 AM   Hospital Encounter and Summary: Linked  Stacey Bland LPN

## 2024-07-07 ENCOUNTER — APPOINTMENT (OUTPATIENT)
Dept: RADIOLOGY | Facility: HOSPITAL | Age: 74
End: 2024-07-07
Payer: MEDICARE

## 2024-07-07 ENCOUNTER — APPOINTMENT (OUTPATIENT)
Dept: CARDIOLOGY | Facility: HOSPITAL | Age: 74
End: 2024-07-07
Payer: MEDICARE

## 2024-07-07 ENCOUNTER — HOSPITAL ENCOUNTER (EMERGENCY)
Facility: HOSPITAL | Age: 74
Discharge: HOME | End: 2024-07-08
Attending: STUDENT IN AN ORGANIZED HEALTH CARE EDUCATION/TRAINING PROGRAM
Payer: MEDICARE

## 2024-07-07 VITALS
SYSTOLIC BLOOD PRESSURE: 151 MMHG | HEART RATE: 18 BPM | BODY MASS INDEX: 28.34 KG/M2 | WEIGHT: 240 LBS | OXYGEN SATURATION: 99 % | RESPIRATION RATE: 76 BRPM | DIASTOLIC BLOOD PRESSURE: 97 MMHG | HEIGHT: 77 IN | TEMPERATURE: 98.2 F

## 2024-07-07 DIAGNOSIS — K76.9 LIVER LESION: Primary | ICD-10-CM

## 2024-07-07 DIAGNOSIS — R19.7 DIARRHEA, UNSPECIFIED TYPE: ICD-10-CM

## 2024-07-07 DIAGNOSIS — R55 VASOVAGAL NEAR SYNCOPE: ICD-10-CM

## 2024-07-07 LAB
ALBUMIN SERPL BCP-MCNC: 4.6 G/DL (ref 3.4–5)
ALP SERPL-CCNC: 96 U/L (ref 33–136)
ALT SERPL W P-5'-P-CCNC: 25 U/L (ref 10–52)
ANION GAP SERPL CALC-SCNC: 14 MMOL/L (ref 10–20)
APPEARANCE UR: CLEAR
AST SERPL W P-5'-P-CCNC: 18 U/L (ref 9–39)
BASOPHILS # BLD AUTO: 0.04 X10*3/UL (ref 0–0.1)
BASOPHILS NFR BLD AUTO: 0.3 %
BILIRUB SERPL-MCNC: 1.1 MG/DL (ref 0–1.2)
BILIRUB UR STRIP.AUTO-MCNC: NEGATIVE MG/DL
BNP SERPL-MCNC: 51 PG/ML (ref 0–99)
BUN SERPL-MCNC: 21 MG/DL (ref 6–23)
C DIF TOX TCDA+TCDB STL QL NAA+PROBE: NOT DETECTED
CALCIUM SERPL-MCNC: 9.8 MG/DL (ref 8.6–10.3)
CARDIAC TROPONIN I PNL SERPL HS: 5 NG/L (ref 0–20)
CARDIAC TROPONIN I PNL SERPL HS: 5 NG/L (ref 0–20)
CHLORIDE SERPL-SCNC: 104 MMOL/L (ref 98–107)
CO2 SERPL-SCNC: 24 MMOL/L (ref 21–32)
COLOR UR: ABNORMAL
CREAT SERPL-MCNC: 1.27 MG/DL (ref 0.5–1.3)
D DIMER PPP FEU-MCNC: 3474 NG/ML FEU
EGFRCR SERPLBLD CKD-EPI 2021: 59 ML/MIN/1.73M*2
EOSINOPHIL # BLD AUTO: 0.11 X10*3/UL (ref 0–0.4)
EOSINOPHIL NFR BLD AUTO: 0.8 %
ERYTHROCYTE [DISTWIDTH] IN BLOOD BY AUTOMATED COUNT: 13.9 % (ref 11.5–14.5)
GLUCOSE BLD MANUAL STRIP-MCNC: 222 MG/DL (ref 74–99)
GLUCOSE SERPL-MCNC: 315 MG/DL (ref 74–99)
GLUCOSE UR STRIP.AUTO-MCNC: ABNORMAL MG/DL
HCT VFR BLD AUTO: 47.1 % (ref 41–52)
HGB BLD-MCNC: 15.8 G/DL (ref 13.5–17.5)
IMM GRANULOCYTES # BLD AUTO: 0.04 X10*3/UL (ref 0–0.5)
IMM GRANULOCYTES NFR BLD AUTO: 0.3 % (ref 0–0.9)
KETONES UR STRIP.AUTO-MCNC: ABNORMAL MG/DL
LACTATE SERPL-SCNC: 1.3 MMOL/L (ref 0.4–2)
LEUKOCYTE ESTERASE UR QL STRIP.AUTO: NEGATIVE
LIPASE SERPL-CCNC: 24 U/L (ref 9–82)
LYMPHOCYTES # BLD AUTO: 1.1 X10*3/UL (ref 0.8–3)
LYMPHOCYTES NFR BLD AUTO: 8.4 %
MAGNESIUM SERPL-MCNC: 2.38 MG/DL (ref 1.6–2.4)
MCH RBC QN AUTO: 27.5 PG (ref 26–34)
MCHC RBC AUTO-ENTMCNC: 33.5 G/DL (ref 32–36)
MCV RBC AUTO: 82 FL (ref 80–100)
MONOCYTES # BLD AUTO: 0.74 X10*3/UL (ref 0.05–0.8)
MONOCYTES NFR BLD AUTO: 5.7 %
NEUTROPHILS # BLD AUTO: 11.03 X10*3/UL (ref 1.6–5.5)
NEUTROPHILS NFR BLD AUTO: 84.5 %
NITRITE UR QL STRIP.AUTO: NEGATIVE
NRBC BLD-RTO: 0 /100 WBCS (ref 0–0)
PH UR STRIP.AUTO: 5 [PH]
PLATELET # BLD AUTO: 243 X10*3/UL (ref 150–450)
POTASSIUM SERPL-SCNC: 4.7 MMOL/L (ref 3.5–5.3)
PROT SERPL-MCNC: 7.6 G/DL (ref 6.4–8.2)
PROT UR STRIP.AUTO-MCNC: NEGATIVE MG/DL
RBC # BLD AUTO: 5.74 X10*6/UL (ref 4.5–5.9)
RBC # UR STRIP.AUTO: NEGATIVE /UL
SODIUM SERPL-SCNC: 137 MMOL/L (ref 136–145)
SP GR UR STRIP.AUTO: 1.04
T4 FREE SERPL-MCNC: 1.17 NG/DL (ref 0.61–1.12)
TSH SERPL-ACNC: 5.89 MIU/L (ref 0.44–3.98)
UROBILINOGEN UR STRIP.AUTO-MCNC: NORMAL MG/DL
WBC # BLD AUTO: 13.1 X10*3/UL (ref 4.4–11.3)

## 2024-07-07 PROCEDURE — 85379 FIBRIN DEGRADATION QUANT: CPT | Performed by: STUDENT IN AN ORGANIZED HEALTH CARE EDUCATION/TRAINING PROGRAM

## 2024-07-07 PROCEDURE — 84439 ASSAY OF FREE THYROXINE: CPT | Performed by: STUDENT IN AN ORGANIZED HEALTH CARE EDUCATION/TRAINING PROGRAM

## 2024-07-07 PROCEDURE — 87493 C DIFF AMPLIFIED PROBE: CPT | Performed by: EMERGENCY MEDICINE

## 2024-07-07 PROCEDURE — 84443 ASSAY THYROID STIM HORMONE: CPT | Performed by: STUDENT IN AN ORGANIZED HEALTH CARE EDUCATION/TRAINING PROGRAM

## 2024-07-07 PROCEDURE — 80053 COMPREHEN METABOLIC PANEL: CPT | Performed by: STUDENT IN AN ORGANIZED HEALTH CARE EDUCATION/TRAINING PROGRAM

## 2024-07-07 PROCEDURE — 81003 URINALYSIS AUTO W/O SCOPE: CPT | Performed by: STUDENT IN AN ORGANIZED HEALTH CARE EDUCATION/TRAINING PROGRAM

## 2024-07-07 PROCEDURE — 36415 COLL VENOUS BLD VENIPUNCTURE: CPT | Performed by: STUDENT IN AN ORGANIZED HEALTH CARE EDUCATION/TRAINING PROGRAM

## 2024-07-07 PROCEDURE — 2550000001 HC RX 255 CONTRASTS: Performed by: STUDENT IN AN ORGANIZED HEALTH CARE EDUCATION/TRAINING PROGRAM

## 2024-07-07 PROCEDURE — 71275 CT ANGIOGRAPHY CHEST: CPT

## 2024-07-07 PROCEDURE — 83880 ASSAY OF NATRIURETIC PEPTIDE: CPT | Performed by: STUDENT IN AN ORGANIZED HEALTH CARE EDUCATION/TRAINING PROGRAM

## 2024-07-07 PROCEDURE — 83735 ASSAY OF MAGNESIUM: CPT | Performed by: STUDENT IN AN ORGANIZED HEALTH CARE EDUCATION/TRAINING PROGRAM

## 2024-07-07 PROCEDURE — 71046 X-RAY EXAM CHEST 2 VIEWS: CPT | Performed by: RADIOLOGY

## 2024-07-07 PROCEDURE — 74176 CT ABD & PELVIS W/O CONTRAST: CPT

## 2024-07-07 PROCEDURE — 84484 ASSAY OF TROPONIN QUANT: CPT | Performed by: STUDENT IN AN ORGANIZED HEALTH CARE EDUCATION/TRAINING PROGRAM

## 2024-07-07 PROCEDURE — 74176 CT ABD & PELVIS W/O CONTRAST: CPT | Performed by: RADIOLOGY

## 2024-07-07 PROCEDURE — 82947 ASSAY GLUCOSE BLOOD QUANT: CPT

## 2024-07-07 PROCEDURE — 99285 EMERGENCY DEPT VISIT HI MDM: CPT | Mod: 25

## 2024-07-07 PROCEDURE — 2500000004 HC RX 250 GENERAL PHARMACY W/ HCPCS (ALT 636 FOR OP/ED): Performed by: STUDENT IN AN ORGANIZED HEALTH CARE EDUCATION/TRAINING PROGRAM

## 2024-07-07 PROCEDURE — 93005 ELECTROCARDIOGRAM TRACING: CPT

## 2024-07-07 PROCEDURE — 83605 ASSAY OF LACTIC ACID: CPT | Performed by: STUDENT IN AN ORGANIZED HEALTH CARE EDUCATION/TRAINING PROGRAM

## 2024-07-07 PROCEDURE — 83690 ASSAY OF LIPASE: CPT | Performed by: STUDENT IN AN ORGANIZED HEALTH CARE EDUCATION/TRAINING PROGRAM

## 2024-07-07 PROCEDURE — 85025 COMPLETE CBC W/AUTO DIFF WBC: CPT | Performed by: STUDENT IN AN ORGANIZED HEALTH CARE EDUCATION/TRAINING PROGRAM

## 2024-07-07 PROCEDURE — 96361 HYDRATE IV INFUSION ADD-ON: CPT

## 2024-07-07 PROCEDURE — 96374 THER/PROPH/DIAG INJ IV PUSH: CPT

## 2024-07-07 PROCEDURE — 71275 CT ANGIOGRAPHY CHEST: CPT | Performed by: STUDENT IN AN ORGANIZED HEALTH CARE EDUCATION/TRAINING PROGRAM

## 2024-07-07 PROCEDURE — 71046 X-RAY EXAM CHEST 2 VIEWS: CPT

## 2024-07-07 RX ORDER — ONDANSETRON HYDROCHLORIDE 2 MG/ML
4 INJECTION, SOLUTION INTRAVENOUS ONCE
Status: COMPLETED | OUTPATIENT
Start: 2024-07-07 | End: 2024-07-07

## 2024-07-07 ASSESSMENT — PAIN SCALES - GENERAL
PAINLEVEL_OUTOF10: 4
PAINLEVEL_OUTOF10: 2

## 2024-07-07 ASSESSMENT — LIFESTYLE VARIABLES
HAVE PEOPLE ANNOYED YOU BY CRITICIZING YOUR DRINKING: NO
TOTAL SCORE: 0
HAVE YOU EVER FELT YOU SHOULD CUT DOWN ON YOUR DRINKING: NO
EVER HAD A DRINK FIRST THING IN THE MORNING TO STEADY YOUR NERVES TO GET RID OF A HANGOVER: NO
EVER FELT BAD OR GUILTY ABOUT YOUR DRINKING: NO

## 2024-07-07 ASSESSMENT — PAIN DESCRIPTION - LOCATION: LOCATION: ABDOMEN

## 2024-07-07 ASSESSMENT — PAIN DESCRIPTION - PAIN TYPE: TYPE: ACUTE PAIN

## 2024-07-07 ASSESSMENT — PAIN - FUNCTIONAL ASSESSMENT: PAIN_FUNCTIONAL_ASSESSMENT: 0-10

## 2024-07-07 NOTE — ED TRIAGE NOTES
Pt to ER with c/o syncopal event when trying to have a bowel movement today around noon. Pt alert and oriented x 3, follows commands appropriately.

## 2024-07-08 ENCOUNTER — TELEPHONE (OUTPATIENT)
Dept: HEMATOLOGY/ONCOLOGY | Facility: HOSPITAL | Age: 74
End: 2024-07-08
Payer: MEDICARE

## 2024-07-08 DIAGNOSIS — N40.0 PROSTATE ENLARGEMENT: ICD-10-CM

## 2024-07-08 DIAGNOSIS — K76.9 LIVER LESION: Primary | ICD-10-CM

## 2024-07-08 DIAGNOSIS — E11.649 TYPE 2 DIABETES MELLITUS WITH HYPOGLYCEMIA WITHOUT COMA, WITH LONG-TERM CURRENT USE OF INSULIN (MULTI): Primary | Chronic | ICD-10-CM

## 2024-07-08 DIAGNOSIS — Z79.4 TYPE 2 DIABETES MELLITUS WITH HYPOGLYCEMIA WITHOUT COMA, WITH LONG-TERM CURRENT USE OF INSULIN (MULTI): Primary | Chronic | ICD-10-CM

## 2024-07-08 LAB — HOLD SPECIMEN: NORMAL

## 2024-07-08 NOTE — TELEPHONE ENCOUNTER
Referral placed to [facility] by Dr. Wagoner, [place] [facility], for multiple ill-defined liver lesions, discovered incidentally on CT imaging 7/7/24. I called the patient and discussed his imaging results and the referral. Advised lab work (PSA) & dedicated liver MRI for further evaluation. Patient is agreeable. All questions answered. I placed orders & sent a scheduling request to [facility] scheduling pool. We will follow-up with patient via virtual telephone visit to discuss results and next steps.  CLAUDETTE Barajas.CNP  [facility]     Orders Placed This Encounter   Procedures    MR liver w and wo IV contrast    Prostate Specific Antigen

## 2024-07-08 NOTE — PROGRESS NOTES
This progress note represents a emergency department transition note for signout of care.    Patient care was signed out to me. Please see the previous provider's notes for the full history and physical. Briefly, Juan Varela is 74 y.o. male who had presented to the emergency department for what seemed like a vasovagal near syncope as he was attempting to stand up after having a bowel movement.  On reevaluation, the patient had been complaining of abdominal cramping.  Patient was signed out pending CT imaging results. I reviewed patient's workup.  The patient had negative troponins.  D-dimer had been elevated which was why a CT PE study had been ordered.  That CT scan was negative other than age-indeterminate and superior endplate fracture of L1.  Patient CT imaging however did find multiple possible critical findings.  There was ill-defined lesions in the liver concerning for malignancy and metastases.  There is also rectal wall thickening which could be neoplasm versus proctocolitis.  Patient was having continued stool which would be consistent with possible colitis.  The patient was provided a printout of the results.  Did ask for urine and stool testing.  C. difficile test was negative.  Patient informed of findings.  Was advised strict return precautions and need for follow-up.  Was advised supportive care instructions.  The patient verbalized understanding, agreed to plan, patient was discharged home.  Was provided GI follow-up in addition to [facility] cancer follow-up.        Rodolfo Wagoner DO  Emergency Medicine    ED Course as of 07/08/24 0741   Sun Jul 07, 2024   [id number]     IMPRESSION:  1.  No evidence of acute cardiopulmonary process.       [CF]   6949 IMPRESSION:  1.  No pulmonary emboli or acute chest pathology. Moderate coronary  artery atherosclerotic calcifications.  2. Partially visualized age-indeterminate possibly chronic height  loss of the superior endplate of L1 vertebral body. Correlation  with  point tenderness recommended.   [CF]   2109 Reviewed patient discharge summary from June 28, 2024.    Diabetes, A-fib, diabetes in addition to echocardiogram performed June 27 showing EF of 62%. [WJ]   2155 Lactate: 1.3 [WJ]   2351 C. difficile, PCR: Not Detected [WJ]      ED Course User Index  [CF] Vianey Cardenas MD  [WJ] Gabo Wagoner DO         Diagnoses as of 07/08/24 0741   Liver lesion   Diarrhea, unspecified type   Vasovagal near syncope

## 2024-07-13 LAB
ATRIAL RATE: 67 BPM
P AXIS: 60 DEGREES
PR INTERVAL: 61 MS
Q ONSET: 252 MS
QRS COUNT: 11 BEATS
QRS DURATION: 99 MS
QT INTERVAL: 462 MS
QTC CALCULATION(BAZETT): 481 MS
QTC FREDERICIA: 474 MS
R AXIS: -45 DEGREES
T AXIS: 60 DEGREES
T OFFSET: 483 MS
VENTRICULAR RATE: 65 BPM

## 2024-07-26 ENCOUNTER — HOSPITAL ENCOUNTER (OUTPATIENT)
Dept: RADIOLOGY | Facility: HOSPITAL | Age: 74
Discharge: HOME | End: 2024-07-26
Payer: MEDICARE

## 2024-07-26 DIAGNOSIS — K76.9 LIVER LESION: ICD-10-CM

## 2024-07-26 PROCEDURE — 2550000001 HC RX 255 CONTRASTS: Performed by: NURSE PRACTITIONER

## 2024-07-26 PROCEDURE — 74183 MRI ABD W/O CNTR FLWD CNTR: CPT

## 2024-07-26 PROCEDURE — A9575 INJ GADOTERATE MEGLUMI 0.1ML: HCPCS | Performed by: NURSE PRACTITIONER

## 2024-07-26 RX ORDER — GADOTERATE MEGLUMINE 376.9 MG/ML
20 INJECTION INTRAVENOUS
Status: COMPLETED | OUTPATIENT
Start: 2024-07-26 | End: 2024-07-26

## 2024-07-29 ENCOUNTER — APPOINTMENT (OUTPATIENT)
Dept: HEMATOLOGY/ONCOLOGY | Facility: HOSPITAL | Age: 74
End: 2024-07-29
Payer: MEDICARE

## 2024-07-29 ENCOUNTER — TELEPHONE (OUTPATIENT)
Dept: HEMATOLOGY/ONCOLOGY | Facility: HOSPITAL | Age: 74
End: 2024-07-29
Payer: MEDICARE

## 2024-07-29 NOTE — TELEPHONE ENCOUNTER
I called Mr Varela to review his MRI liver results, as he canceled our diagnostic clinic virtual visit scheduled earlier today. He could not recall speaking to me via telephone on 7/8 or the reason for his MRI. He was argumentative from the onset of the phone conversation.  I explained his CT findings from his ED visit on 7/7/24 and his referral to the diagnostic clinic which was prompted by these findings. He continued to interrupt and argue with me. I reviewed his 7/26/24 MRI findings with him, which are concerning for malignancy. He stated that he wants a physician to review his results with him, not a nurse. I discussed IR liver biopsy to establish a diagnosis; he declined and continue to argue with me. He verbalized disbelief of the findings I reported to him; I offered MyChart to view the results and he declined. I offered a follow-up appt with community oncologist in [place], Dr. Turner. He is agreeable to consultation with Dr. Turner; I sent a message to the schedulers to assist with an appt.  CLAUDETTE Barajas.[facility]

## 2024-07-30 DIAGNOSIS — R16.0 LIVER MASS: Primary | ICD-10-CM

## 2024-08-13 ENCOUNTER — APPOINTMENT (OUTPATIENT)
Dept: GASTROENTEROLOGY | Facility: CLINIC | Age: 74
End: 2024-08-13
Payer: MEDICARE

## 2024-08-13 VITALS
HEIGHT: 77 IN | OXYGEN SATURATION: 98 % | SYSTOLIC BLOOD PRESSURE: 152 MMHG | DIASTOLIC BLOOD PRESSURE: 67 MMHG | HEART RATE: 72 BPM | BODY MASS INDEX: 27.63 KG/M2 | WEIGHT: 234 LBS

## 2024-08-13 DIAGNOSIS — K76.9 LIVER LESION: ICD-10-CM

## 2024-08-13 DIAGNOSIS — R93.89 ABNORMAL CT SCAN: Primary | ICD-10-CM

## 2024-08-13 PROCEDURE — 99205 OFFICE O/P NEW HI 60 MIN: CPT | Performed by: NURSE PRACTITIONER

## 2024-08-13 PROCEDURE — 1123F ACP DISCUSS/DSCN MKR DOCD: CPT | Performed by: NURSE PRACTITIONER

## 2024-08-13 PROCEDURE — 1036F TOBACCO NON-USER: CPT | Performed by: NURSE PRACTITIONER

## 2024-08-13 PROCEDURE — 1159F MED LIST DOCD IN RCRD: CPT | Performed by: NURSE PRACTITIONER

## 2024-08-13 RX ORDER — POLYETHYLENE GLYCOL 3350, SODIUM SULFATE ANHYDROUS, SODIUM BICARBONATE, SODIUM CHLORIDE, POTASSIUM CHLORIDE 236; 22.74; 6.74; 5.86; 2.97 G/4L; G/4L; G/4L; G/4L; G/4L
4000 POWDER, FOR SOLUTION ORAL ONCE
Qty: 4000 ML | Refills: 0 | Status: SHIPPED | OUTPATIENT
Start: 2024-08-13 | End: 2024-08-13

## 2024-08-13 ASSESSMENT — ENCOUNTER SYMPTOMS
BRUISES/BLEEDS EASILY: 0
PALPITATIONS: 0
WEAKNESS: 0
DIFFICULTY URINATING: 0
FEVER: 0
ROS GI COMMENTS: SEE HPI
SHORTNESS OF BREATH: 0
CHILLS: 0
TROUBLE SWALLOWING: 0
ARTHRALGIAS: 0
COUGH: 0
DIZZINESS: 0
CONFUSION: 0
JOINT SWELLING: 0
SORE THROAT: 0
WOUND: 0
ADENOPATHY: 0

## 2024-08-13 NOTE — PROGRESS NOTES
Subjective   Patient ID: Juan Varela is a 74 y.o. male with PMH of afib, DM2, HTN, afib, and hypothyroidism who was referred by Gabo Wagoner DO for Results.     Patient's PCP is Roseanna Durbin DO     HPI    Patient was in ER 7/7/24 for near syncope. While in ER, he reported abdominal pain. CT A/P noted diffuse rectal wall thickening and multifocal ill-defined liver lesions c/f metastasis. He was discharged with follow up recommendations to oncology and GI. Mara Cornejo CNP with oncology contacted the patient on 7/8/2024 and recommended an MRI liver. This was completed 7/26/24. MRI liver showed 6 bilober enhancing liver lesions concerning for mets. Bowel was noted to be normal on this MRI. The CNP called patient with results and recommended IR for biopsy but patient declined and wanted to see a physician for follow up. He is scheduled with Dr. Turner next week. PSA was ordered, but not yet completed.     He has lost 15lbs over the last year. He states this was unintentional. He reports diarrhea 1 month ago but none currently. He denies rectal bleeding and abdominal pain. He otherwise denies N/V and melena.     His biggest concern this visit was his diabetes medication adjustments that were made during his hospitalization in June 2024. It was explained he should follow this up with endocrinology.     He was hospitalized in June 2024 for afib, hypoglycemia, and near syncope. He is currently on sotolol. His most recent EKG 7/7/24 showed NSR. Qtc has returned to normal.     No prior colonoscopy       Summary of endoscopies:      Social Hx:  Tobacco: none   Etoh: none    Recreational drug use: none   NSAIDs: none       Family Hx:  No GI malignancy, IBD, or pancreatitis     Review of Systems:  Review of Systems   Constitutional:  Negative for chills and fever.   HENT:  Negative for sore throat and trouble swallowing.    Respiratory:  Negative for cough and shortness of breath.    Cardiovascular:  Negative for  chest pain and palpitations.   Gastrointestinal:         SEE HPI   Endocrine: Negative for cold intolerance and heat intolerance.   Genitourinary:  Negative for difficulty urinating.   Musculoskeletal:  Negative for arthralgias and joint swelling.   Skin:  Negative for rash and wound.   Neurological:  Negative for dizziness and weakness.   Hematological:  Negative for adenopathy. Does not bruise/bleed easily.   Psychiatric/Behavioral:  Negative for confusion.         Medications:  Prior to Admission medications    Medication Sig Start Date End Date Taking? Authorizing Provider   atorvastatin (Lipitor) 40 mg tablet TAKE 1 TABLET BY MOUTH EVERYDAY AT BEDTIME 3/8/24   Genesis Mancuso MD   empagliflozin (Jardiance) 25 mg Take 1 tablet (25 mg) by mouth once daily. 6/4/24 12/1/24  Genesis Mancuso MD   insulin glargine (Lantus) 100 unit/mL injection Inject 40 Units under the skin once daily in the morning. Take before meals. Take as directed per insulin instructions. Do not fill before June 29, 2024. 6/29/24   Rd Esquivel DO   levothyroxine (Synthroid, Levoxyl) 150 mcg tablet TAKE 1 TABLET BY MOUTH EVERY DAY AS DIRECTED 5/24/24   Genesis Mancuso MD   lisinopril 2.5 mg tablet TAKE 1 TABLET BY MOUTH ONCE DAILY. 4/26/24   Genesis Mancuso MD   pioglitazone (Actos) 45 mg tablet TAKE 1 TABLET (45 MG) BY MOUTH ONCE DAILY 5/24/24 11/20/24  Genesis Mancuso MD   sotalol (Betapace) 120 mg tablet TAKE 1/2 TABLET (60 MG) BY MOUTH 2 TIMES A DAY. 3/6/24   Chet Ley PA-C       Allergies:  Patient has no known allergies.    Past Medical History:  He has a past medical history of Atrial fibrillation (Multi), Diabetes mellitus (Multi), and Hypertension.    Past Surgical History:  He has a past surgical history that includes Other surgical history (09/02/2021).    Social History:  He reports that he has never smoked. He has never been exposed to tobacco smoke. He has never  used smokeless tobacco. He reports that he does not drink alcohol and does not use drugs.    Objective   Physical exam:  Physical Exam  Constitutional:       General: He is not in acute distress.     Appearance: Normal appearance.   HENT:      Mouth/Throat:      Mouth: Mucous membranes are moist.      Comments: pink  Eyes:      Conjunctiva/sclera: Conjunctivae normal.      Pupils: Pupils are equal, round, and reactive to light.   Cardiovascular:      Rate and Rhythm: Normal rate and regular rhythm.      Heart sounds: No murmur heard.  Pulmonary:      Effort: Pulmonary effort is normal.      Breath sounds: Normal breath sounds.   Abdominal:      General: Bowel sounds are normal. There is no distension.      Palpations: Abdomen is soft.      Tenderness: There is no abdominal tenderness. There is no guarding.   Skin:     General: Skin is warm and dry.      Coloration: Skin is not jaundiced.   Neurological:      Mental Status: He is alert and oriented to person, place, and time.   Psychiatric:      Comments: Argumentative      Results:    Study Result    Narrative & Impression   Interpreted By:  Ethel Ibarra,   STUDY:  CT abdomen pelvis without contrast;      INDICATION:  Signs/Symptoms:abdominal pain.      COMPARISON:  None.      ACCESSION NUMBER(S):  [id number]      ORDERING CLINICIAN:  CHUCK JEAN      TECHNIQUE:  Axial noncontrast CT images of the abdomen and pelvis with coronal  and sagittal reconstructed images.      FINDINGS:  LOWER CHEST: No acute abnormality of the lung bases. Possible small  hiatal hernia. Aortic calcifications. BONES: There is an age  indeterminate mild-to-moderate anterior wedge deformity at L1.  ABDOMINAL WALL: Within normal limits.      ABDOMEN:  Lack of intravenous contrast limits evaluation of vessels and solid  organs. LIVER: Multiple scattered ill-defined vague low-attenuation  lesions are noted throughout the liver measuring up to 2.6 cm on the  left and 1.9 cm on the  right (annotated in PACS series 2). BILE  DUCTS: Normal caliber. GALLBLADDER: Status post cholecystectomy.  PANCREAS: No peripancreatic inflammatory stranding or duct  dilatation. Pancreatic atrophy. SPLEEN: Mildly enlarged, 12.8 cm and  accessory splenule. ADRENALS: Minimal non-specific thickening and  nodularity of the adrenal glands.      KIDNEYS, URETERS, URINARY BLADDER: No hydronephrosis or urinary tract  calculus. Contrast within the collecting system and urinary bladder.  The urinary bladder is distended with contrast.      VESSELS: Atherosclerotic calcifications without aneurysmal dilatation  seen. RETROPERITONEUM: No pathologically enlarged lymph nodes.      PELVIS:      REPRODUCTIVE ORGANS: The prostate is enlarged up to 6.7 cm with  nodularity extending into the base of the urinary bladder. No  significant free pelvic fluid.      BOWEL: The rectum is distended up to 7.4 cm with inspissated stool  and circumferential wall thickening measuring up to 1.2 cm. There is  mild stool filled distention of the proximal sigmoid colon with mild  wall thickening and inflammatory changes present. Otherwise no  dilated loops of bowel are identified.  The visualized appendix is  unremarkable. PERITONEUM: No ascites or free air, no fluid collection.      IMPRESSION:  Multifocal ill-defined lesions within the liver. Findings are  concerning for malignancy/metastasis. Dedicated contrast-enhanced  liver MRI is suggested for further evaluation.      Diffuse rectal wall thickening noted which could be related to  proctitis however correlation for neoplasia is suggested. Inspissated  stool within the rectum with mild inflammatory changes of the distal  colon noted. Correlation for proctocolitis also recommended.      Prostatomegaly. Correlation with PSA is suggested.      Contrast filled bladder distension noted. Correlation for possible  outlet obstruction suggested.      Age indeterminate L1 anterior wedge deformity again  noted and  additional findings as detailed.      MACRO:  Critical Finding:  New mass in the liver. Notification was initiated  on 7/7/2024 at 8:59 pm by  Ethel Ibarra.  (**-YCF-**) Instructions:      Signed by: Ethel Ibarra 7/7/2024 9:00 PM  Dictation workstation:   [id number]     Study Result    Narrative & Impression   Interpreted By:  Susie Ambrosio,   STUDY:  MR LIVER W AND WO IV CONTRAST;  7/26/2024 5:21 pm      INDICATION:  Signs/Symptoms:multiple liver lesions, concern for malignancy/mets.      COMPARISON:  CT abdomen and pelvis 07/07/2024      ACCESSION NUMBER(S):  [id number]      ORDERING CLINICIAN:  JAGRUTI CHAPPELL      TECHNIQUE:  MRI LIVER; Multiplanar magnetic resonance images of the abdomen were  obtained including the following sequences; T2-weighted SSFSE with  and without fat saturation, T1-weighted GRE in/opposed phase, DWI,  fat saturated 3D-T1w GRE pre and dynamically post contrast. 20 mL  Dotarem were administered intravenously without immediate  complication.      FINDINGS:  Liver: Normal morphology. No hepatic steatosis. Multiple  (approximately 6) mildly T2 hyperintense, T1 hypointense enhancing  masses throughout both lobes of the liver with diffusion restriction.  Index lesions as follows (as measured on T2 fat sat sequence 1101): -  Segment 2, 2.8 cm (image 28) - Segment 8, 1.4 cm (image 28)  - Segment 5/6, 2.7 cm (image 20)      Biliary: No intrahepatic or extrahepatic bile duct dilation.  Cholecystectomy.      Spleen: No mass. No splenomegaly.      Pancreas: No mass or duct dilation.      Adrenals: Normal.      Kidneys: No solid or cystic mass. No hydronephrosis.      GI tract: No bowel wall thickening or dilation.      Lymph nodes: Nonspecific portacaval lymph node measuring 0.8 cm  (series 1101, image 21).      Mesentery/peritoneum: No ascites or fluid collection.      Vasculature: No abdominal aortic aneurysm.      Bones/Lower chest: No suspicious bone lesion.No pleural  "effusion.      IMPRESSION:  Multiple (approximately 6) bilobar enhancing liver lesions,  concerning for metastases.      Nonspecific portacaval lymph node.      MACRO  None      Signed by: Susie Ambrosio 7/29/2024 9:09 AM  Dictation workstation:   [id number]         Assessment/Plan     Abnormal imaging   Patient with imaging that has showed concern of liver metastasis without clear primary source. Initial CT showed rectal wall thickening which could be proctitis vs malignancy vs overread. This was not seen on MRI liver. Patient has never had a colonoscopy before. Would highly recommend colonoscopy for patient to look for a primary colon source of possible malignancy.     I spent an extensive amount of time explaining concerns of possible malignancy to the patient, concerns of progression with any further delay in diagnosis, and the need for colonoscopy. I reviewed the risks/benefits of procedures with the patient including cardiopulmonary complications, perforation, and bleeding. Patient was rather hesitant to have a colonoscopy and states that he \"isnt convinced he needs one\" and \"he doesn't want to come out worse than he came in\". When asked to elaborate, he seems to be concerned about complications of procedures and mentioned getting a 2cd opinion. Fortunately, Dr. Holm was available and willing to speak with patient with me, and he also explained to the patient the concerns on imaging, need for colonoscopy, and risks/benefits of procedure. Patient eventually agreed to have procedure.     He will be scheduled in the endoscopy center next week. He also has an appointment with oncology (Dr. Turner) later next week.     Meds to hold: Jardiance x3 days         60 minutes spent in the total care of this patient        Kathie Hernandez, APRN-CNP   "

## 2024-08-13 NOTE — LETTER
August 13, 2024     Roseanna Durbin DO  [address]  [facility] [address]    Patient: Juan Varela   YOB: 1950   Date of Visit: 8/13/2024       Dear Dr. Roseanna Durbin DO:    Thank you for referring Juan Varela to me for evaluation. Below are my notes for this consultation.  If you have questions, please do not hesitate to call me. I look forward to following your patient along with you.       Sincerely,     CLAUDETTE Pedersen-JANE      CC: CLAUDETTE Rodriguez-JANE Turner MD  ______________________________________________________________________________________    Subjective  Patient ID: Juan Varela is a 74 y.o. male with PMH of afib, DM2, HTN, afib, and hypothyroidism who was referred by Gabo Wagoner DO for Results.     Patient's PCP is Roseanna Durbin DO     HPI    Patient was in ER 7/7/24 for near syncope. While in ER, he reported abdominal pain. CT A/P noted diffuse rectal wall thickening and multifocal ill-defined liver lesions c/f metastasis. He was discharged with follow up recommendations to oncology and GI. Mara Cornejo CNP with oncology contacted the patient on 7/8/2024 and recommended an MRI liver. This was completed 7/26/24. MRI liver showed 6 bilober enhancing liver lesions concerning for mets. Bowel was noted to be normal on this MRI. The CNP called patient with results and recommended IR for biopsy but patient declined and wanted to see a physician for follow up. He is scheduled with Dr. Turner next week. PSA was ordered, but not yet completed.     He has lost 15lbs over the last year. He states this was unintentional. He reports diarrhea 1 month ago but none currently. He denies rectal bleeding and abdominal pain. He otherwise denies N/V and melena.     His biggest concern this visit was his diabetes medication adjustments that were made during his hospitalization in June 2024. It was explained he should follow  this up with endocrinology.     He was hospitalized in June 2024 for afib, hypoglycemia, and near syncope. He is currently on sotolol. His most recent EKG 7/7/24 showed NSR. Qtc has returned to normal.     No prior colonoscopy       Summary of endoscopies:      Social Hx:  Tobacco: none   Etoh: none    Recreational drug use: none   NSAIDs: none       Family Hx:  No GI malignancy, IBD, or pancreatitis     Review of Systems:  Review of Systems   Constitutional:  Negative for chills and fever.   HENT:  Negative for sore throat and trouble swallowing.    Respiratory:  Negative for cough and shortness of breath.    Cardiovascular:  Negative for chest pain and palpitations.   Gastrointestinal:         SEE HPI   Endocrine: Negative for cold intolerance and heat intolerance.   Genitourinary:  Negative for difficulty urinating.   Musculoskeletal:  Negative for arthralgias and joint swelling.   Skin:  Negative for rash and wound.   Neurological:  Negative for dizziness and weakness.   Hematological:  Negative for adenopathy. Does not bruise/bleed easily.   Psychiatric/Behavioral:  Negative for confusion.         Medications:  Prior to Admission medications    Medication Sig Start Date End Date Taking? Authorizing Provider   atorvastatin (Lipitor) 40 mg tablet TAKE 1 TABLET BY MOUTH EVERYDAY AT BEDTIME 3/8/24   Genesis Manucso MD   empagliflozin (Jardiance) 25 mg Take 1 tablet (25 mg) by mouth once daily. 6/4/24 12/1/24  Genesis Mancuso MD   insulin glargine (Lantus) 100 unit/mL injection Inject 40 Units under the skin once daily in the morning. Take before meals. Take as directed per insulin instructions. Do not fill before June 29, 2024. 6/29/24   Rd Esquivel DO   levothyroxine (Synthroid, Levoxyl) 150 mcg tablet TAKE 1 TABLET BY MOUTH EVERY DAY AS DIRECTED 5/24/24   Genesis Mancuso MD   lisinopril 2.5 mg tablet TAKE 1 TABLET BY MOUTH ONCE DAILY. 4/26/24   Genesis Levi  MD Jamee   pioglitazone (Actos) 45 mg tablet TAKE 1 TABLET (45 MG) BY MOUTH ONCE DAILY 5/24/24 11/20/24  Genesis Mancuso MD   sotalol (Betapace) 120 mg tablet TAKE 1/2 TABLET (60 MG) BY MOUTH 2 TIMES A DAY. 3/6/24   Chet Ley PA-C       Allergies:  Patient has no known allergies.    Past Medical History:  He has a past medical history of Atrial fibrillation (Multi), Diabetes mellitus (Multi), and Hypertension.    Past Surgical History:  He has a past surgical history that includes Other surgical history (09/02/2021).    Social History:  He reports that he has never smoked. He has never been exposed to tobacco smoke. He has never used smokeless tobacco. He reports that he does not drink alcohol and does not use drugs.    Objective  Physical exam:  Physical Exam  Constitutional:       General: He is not in acute distress.     Appearance: Normal appearance.   HENT:      Mouth/Throat:      Mouth: Mucous membranes are moist.      Comments: pink  Eyes:      Conjunctiva/sclera: Conjunctivae normal.      Pupils: Pupils are equal, round, and reactive to light.   Cardiovascular:      Rate and Rhythm: Normal rate and regular rhythm.      Heart sounds: No murmur heard.  Pulmonary:      Effort: Pulmonary effort is normal.      Breath sounds: Normal breath sounds.   Abdominal:      General: Bowel sounds are normal. There is no distension.      Palpations: Abdomen is soft.      Tenderness: There is no abdominal tenderness. There is no guarding.   Skin:     General: Skin is warm and dry.      Coloration: Skin is not jaundiced.   Neurological:      Mental Status: He is alert and oriented to person, place, and time.   Psychiatric:      Comments: Argumentative      Results:    Study Result    Narrative & Impression   Interpreted By:  Ethel Ibarra,   STUDY:  CT abdomen pelvis without contrast;      INDICATION:  Signs/Symptoms:abdominal pain.      COMPARISON:  None.      ACCESSION NUMBER(S):  [id number]       ORDERING CLINICIAN:  CHUCK JEAN      TECHNIQUE:  Axial noncontrast CT images of the abdomen and pelvis with coronal  and sagittal reconstructed images.      FINDINGS:  LOWER CHEST: No acute abnormality of the lung bases. Possible small  hiatal hernia. Aortic calcifications. BONES: There is an age  indeterminate mild-to-moderate anterior wedge deformity at L1.  ABDOMINAL WALL: Within normal limits.      ABDOMEN:  Lack of intravenous contrast limits evaluation of vessels and solid  organs. LIVER: Multiple scattered ill-defined vague low-attenuation  lesions are noted throughout the liver measuring up to 2.6 cm on the  left and 1.9 cm on the right (annotated in PACS series 2). BILE  DUCTS: Normal caliber. GALLBLADDER: Status post cholecystectomy.  PANCREAS: No peripancreatic inflammatory stranding or duct  dilatation. Pancreatic atrophy. SPLEEN: Mildly enlarged, 12.8 cm and  accessory splenule. ADRENALS: Minimal non-specific thickening and  nodularity of the adrenal glands.      KIDNEYS, URETERS, URINARY BLADDER: No hydronephrosis or urinary tract  calculus. Contrast within the collecting system and urinary bladder.  The urinary bladder is distended with contrast.      VESSELS: Atherosclerotic calcifications without aneurysmal dilatation  seen. RETROPERITONEUM: No pathologically enlarged lymph nodes.      PELVIS:      REPRODUCTIVE ORGANS: The prostate is enlarged up to 6.7 cm with  nodularity extending into the base of the urinary bladder. No  significant free pelvic fluid.      BOWEL: The rectum is distended up to 7.4 cm with inspissated stool  and circumferential wall thickening measuring up to 1.2 cm. There is  mild stool filled distention of the proximal sigmoid colon with mild  wall thickening and inflammatory changes present. Otherwise no  dilated loops of bowel are identified.  The visualized appendix is  unremarkable. PERITONEUM: No ascites or free air, no fluid collection.       IMPRESSION:  Multifocal ill-defined lesions within the liver. Findings are  concerning for malignancy/metastasis. Dedicated contrast-enhanced  liver MRI is suggested for further evaluation.      Diffuse rectal wall thickening noted which could be related to  proctitis however correlation for neoplasia is suggested. Inspissated  stool within the rectum with mild inflammatory changes of the distal  colon noted. Correlation for proctocolitis also recommended.      Prostatomegaly. Correlation with PSA is suggested.      Contrast filled bladder distension noted. Correlation for possible  outlet obstruction suggested.      Age indeterminate L1 anterior wedge deformity again noted and  additional findings as detailed.      MACRO:  Critical Finding:  New mass in the liver. Notification was initiated  on 7/7/2024 at 8:59 pm by  Ethel Ibarra.  (**-YCF-**) Instructions:      Signed by: Ethel Ibarra 7/7/2024 9:00 PM  Dictation workstation:   [id number]     Study Result    Narrative & Impression   Interpreted By:  Susie Ambrosio,   STUDY:  MR LIVER W AND WO IV CONTRAST;  7/26/2024 5:21 pm      INDICATION:  Signs/Symptoms:multiple liver lesions, concern for malignancy/mets.      COMPARISON:  CT abdomen and pelvis 07/07/2024      ACCESSION NUMBER(S):  [id number]      ORDERING CLINICIAN:  JAGRUTI CHAPPELL      TECHNIQUE:  MRI LIVER; Multiplanar magnetic resonance images of the abdomen were  obtained including the following sequences; T2-weighted SSFSE with  and without fat saturation, T1-weighted GRE in/opposed phase, DWI,  fat saturated 3D-T1w GRE pre and dynamically post contrast. 20 mL  Dotarem were administered intravenously without immediate  complication.      FINDINGS:  Liver: Normal morphology. No hepatic steatosis. Multiple  (approximately 6) mildly T2 hyperintense, T1 hypointense enhancing  masses throughout both lobes of the liver with diffusion restriction.  Index lesions as follows (as measured on T2 fat sat  "sequence 1101): -  Segment 2, 2.8 cm (image 28) - Segment 8, 1.4 cm (image 28)  - Segment 5/6, 2.7 cm (image 20)      Biliary: No intrahepatic or extrahepatic bile duct dilation.  Cholecystectomy.      Spleen: No mass. No splenomegaly.      Pancreas: No mass or duct dilation.      Adrenals: Normal.      Kidneys: No solid or cystic mass. No hydronephrosis.      GI tract: No bowel wall thickening or dilation.      Lymph nodes: Nonspecific portacaval lymph node measuring 0.8 cm  (series 1101, image 21).      Mesentery/peritoneum: No ascites or fluid collection.      Vasculature: No abdominal aortic aneurysm.      Bones/Lower chest: No suspicious bone lesion.No pleural effusion.      IMPRESSION:  Multiple (approximately 6) bilobar enhancing liver lesions,  concerning for metastases.      Nonspecific portacaval lymph node.      MACRO  None      Signed by: Susie Ambrosio 7/29/2024 9:09 AM  Dictation workstation:   [id number]         Assessment/Plan    Abnormal imaging   Patient with imaging that has showed concern of liver metastasis without clear primary source. Initial CT showed rectal wall thickening which could be proctitis vs malignancy vs overread. This was not seen on MRI liver. Patient has never had a colonoscopy before. Would highly recommend colonoscopy for patient to look for a primary colon source of possible malignancy.     I spent an extensive amount of time explaining concerns of possible malignancy to the patient, concerns of progression with any further delay in diagnosis, and the need for colonoscopy. I reviewed the risks/benefits of procedures with the patient including cardiopulmonary complications, perforation, and bleeding. Patient was rather hesitant to have a colonoscopy and states that he \"isnt convinced he needs one\" and \"he doesn't want to come out worse than he came in\". When asked to elaborate, he seems to be concerned about complications of procedures and mentioned getting a 2cd opinion. " Fortunately, Dr. Holm was available and willing to speak with patient with me, and he also explained to the patient the concerns on imaging, need for colonoscopy, and risks/benefits of procedure. Patient eventually agreed to have procedure.     He will be scheduled in the endoscopy center next week. He also has an appointment with oncology (Dr. Turner) later next week.     Meds to hold: Jardiance x3 days         60 minutes spent in the total care of this patient        Kathie Hernandez, APRN-CNP

## 2024-08-13 NOTE — H&P (VIEW-ONLY)
Subjective   Patient ID: Juan Varela is a 74 y.o. male with PMH of afib, DM2, HTN, afib, and hypothyroidism who was referred by Gabo Wagoner DO for Results.     Patient's PCP is Roseanna Durbin DO     HPI    Patient was in ER 7/7/24 for near syncope. While in ER, he reported abdominal pain. CT A/P noted diffuse rectal wall thickening and multifocal ill-defined liver lesions c/f metastasis. He was discharged with follow up recommendations to oncology and GI. Mara Cornejo CNP with oncology contacted the patient on 7/8/2024 and recommended an MRI liver. This was completed 7/26/24. MRI liver showed 6 bilober enhancing liver lesions concerning for mets. Bowel was noted to be normal on this MRI. The CNP called patient with results and recommended IR for biopsy but patient declined and wanted to see a physician for follow up. He is scheduled with Dr. Turner next week. PSA was ordered, but not yet completed.     He has lost 15lbs over the last year. He states this was unintentional. He reports diarrhea 1 month ago but none currently. He denies rectal bleeding and abdominal pain. He otherwise denies N/V and melena.     His biggest concern this visit was his diabetes medication adjustments that were made during his hospitalization in June 2024. It was explained he should follow this up with endocrinology.     He was hospitalized in June 2024 for afib, hypoglycemia, and near syncope. He is currently on sotolol. His most recent EKG 7/7/24 showed NSR. Qtc has returned to normal.     No prior colonoscopy       Summary of endoscopies:      Social Hx:  Tobacco: none   Etoh: none    Recreational drug use: none   NSAIDs: none       Family Hx:  No GI malignancy, IBD, or pancreatitis     Review of Systems:  Review of Systems   Constitutional:  Negative for chills and fever.   HENT:  Negative for sore throat and trouble swallowing.    Respiratory:  Negative for cough and shortness of breath.    Cardiovascular:  Negative for  chest pain and palpitations.   Gastrointestinal:         SEE HPI   Endocrine: Negative for cold intolerance and heat intolerance.   Genitourinary:  Negative for difficulty urinating.   Musculoskeletal:  Negative for arthralgias and joint swelling.   Skin:  Negative for rash and wound.   Neurological:  Negative for dizziness and weakness.   Hematological:  Negative for adenopathy. Does not bruise/bleed easily.   Psychiatric/Behavioral:  Negative for confusion.         Medications:  Prior to Admission medications    Medication Sig Start Date End Date Taking? Authorizing Provider   atorvastatin (Lipitor) 40 mg tablet TAKE 1 TABLET BY MOUTH EVERYDAY AT BEDTIME 3/8/24   Genesis Mancuso MD   empagliflozin (Jardiance) 25 mg Take 1 tablet (25 mg) by mouth once daily. 6/4/24 12/1/24  Genesis Mancuso MD   insulin glargine (Lantus) 100 unit/mL injection Inject 40 Units under the skin once daily in the morning. Take before meals. Take as directed per insulin instructions. Do not fill before June 29, 2024. 6/29/24   Rd Esquivel DO   levothyroxine (Synthroid, Levoxyl) 150 mcg tablet TAKE 1 TABLET BY MOUTH EVERY DAY AS DIRECTED 5/24/24   Genesis Mancuso MD   lisinopril 2.5 mg tablet TAKE 1 TABLET BY MOUTH ONCE DAILY. 4/26/24   Genesis Mancuso MD   pioglitazone (Actos) 45 mg tablet TAKE 1 TABLET (45 MG) BY MOUTH ONCE DAILY 5/24/24 11/20/24  Genesis Mancuso MD   sotalol (Betapace) 120 mg tablet TAKE 1/2 TABLET (60 MG) BY MOUTH 2 TIMES A DAY. 3/6/24   Chet Ley PA-C       Allergies:  Patient has no known allergies.    Past Medical History:  He has a past medical history of Atrial fibrillation (Multi), Diabetes mellitus (Multi), and Hypertension.    Past Surgical History:  He has a past surgical history that includes Other surgical history (09/02/2021).    Social History:  He reports that he has never smoked. He has never been exposed to tobacco smoke. He has never  used smokeless tobacco. He reports that he does not drink alcohol and does not use drugs.    Objective   Physical exam:  Physical Exam  Constitutional:       General: He is not in acute distress.     Appearance: Normal appearance.   HENT:      Mouth/Throat:      Mouth: Mucous membranes are moist.      Comments: pink  Eyes:      Conjunctiva/sclera: Conjunctivae normal.      Pupils: Pupils are equal, round, and reactive to light.   Cardiovascular:      Rate and Rhythm: Normal rate and regular rhythm.      Heart sounds: No murmur heard.  Pulmonary:      Effort: Pulmonary effort is normal.      Breath sounds: Normal breath sounds.   Abdominal:      General: Bowel sounds are normal. There is no distension.      Palpations: Abdomen is soft.      Tenderness: There is no abdominal tenderness. There is no guarding.   Skin:     General: Skin is warm and dry.      Coloration: Skin is not jaundiced.   Neurological:      Mental Status: He is alert and oriented to person, place, and time.   Psychiatric:      Comments: Argumentative      Results:    Study Result    Narrative & Impression   Interpreted By:  Ethel Ibarra,   STUDY:  CT abdomen pelvis without contrast;      INDICATION:  Signs/Symptoms:abdominal pain.      COMPARISON:  None.      ACCESSION NUMBER(S):  [id number]      ORDERING CLINICIAN:  CHUCK JEAN      TECHNIQUE:  Axial noncontrast CT images of the abdomen and pelvis with coronal  and sagittal reconstructed images.      FINDINGS:  LOWER CHEST: No acute abnormality of the lung bases. Possible small  hiatal hernia. Aortic calcifications. BONES: There is an age  indeterminate mild-to-moderate anterior wedge deformity at L1.  ABDOMINAL WALL: Within normal limits.      ABDOMEN:  Lack of intravenous contrast limits evaluation of vessels and solid  organs. LIVER: Multiple scattered ill-defined vague low-attenuation  lesions are noted throughout the liver measuring up to 2.6 cm on the  left and 1.9 cm on the  right (annotated in PACS series 2). BILE  DUCTS: Normal caliber. GALLBLADDER: Status post cholecystectomy.  PANCREAS: No peripancreatic inflammatory stranding or duct  dilatation. Pancreatic atrophy. SPLEEN: Mildly enlarged, 12.8 cm and  accessory splenule. ADRENALS: Minimal non-specific thickening and  nodularity of the adrenal glands.      KIDNEYS, URETERS, URINARY BLADDER: No hydronephrosis or urinary tract  calculus. Contrast within the collecting system and urinary bladder.  The urinary bladder is distended with contrast.      VESSELS: Atherosclerotic calcifications without aneurysmal dilatation  seen. RETROPERITONEUM: No pathologically enlarged lymph nodes.      PELVIS:      REPRODUCTIVE ORGANS: The prostate is enlarged up to 6.7 cm with  nodularity extending into the base of the urinary bladder. No  significant free pelvic fluid.      BOWEL: The rectum is distended up to 7.4 cm with inspissated stool  and circumferential wall thickening measuring up to 1.2 cm. There is  mild stool filled distention of the proximal sigmoid colon with mild  wall thickening and inflammatory changes present. Otherwise no  dilated loops of bowel are identified.  The visualized appendix is  unremarkable. PERITONEUM: No ascites or free air, no fluid collection.      IMPRESSION:  Multifocal ill-defined lesions within the liver. Findings are  concerning for malignancy/metastasis. Dedicated contrast-enhanced  liver MRI is suggested for further evaluation.      Diffuse rectal wall thickening noted which could be related to  proctitis however correlation for neoplasia is suggested. Inspissated  stool within the rectum with mild inflammatory changes of the distal  colon noted. Correlation for proctocolitis also recommended.      Prostatomegaly. Correlation with PSA is suggested.      Contrast filled bladder distension noted. Correlation for possible  outlet obstruction suggested.      Age indeterminate L1 anterior wedge deformity again  noted and  additional findings as detailed.      MACRO:  Critical Finding:  New mass in the liver. Notification was initiated  on 7/7/2024 at 8:59 pm by  Ethel Ibarra.  (**-YCF-**) Instructions:      Signed by: Ethel Ibarra 7/7/2024 9:00 PM  Dictation workstation:   [id number]     Study Result    Narrative & Impression   Interpreted By:  Susie Ambrosio,   STUDY:  MR LIVER W AND WO IV CONTRAST;  7/26/2024 5:21 pm      INDICATION:  Signs/Symptoms:multiple liver lesions, concern for malignancy/mets.      COMPARISON:  CT abdomen and pelvis 07/07/2024      ACCESSION NUMBER(S):  [id number]      ORDERING CLINICIAN:  JAGRUTI CHAPPELL      TECHNIQUE:  MRI LIVER; Multiplanar magnetic resonance images of the abdomen were  obtained including the following sequences; T2-weighted SSFSE with  and without fat saturation, T1-weighted GRE in/opposed phase, DWI,  fat saturated 3D-T1w GRE pre and dynamically post contrast. 20 mL  Dotarem were administered intravenously without immediate  complication.      FINDINGS:  Liver: Normal morphology. No hepatic steatosis. Multiple  (approximately 6) mildly T2 hyperintense, T1 hypointense enhancing  masses throughout both lobes of the liver with diffusion restriction.  Index lesions as follows (as measured on T2 fat sat sequence 1101): -  Segment 2, 2.8 cm (image 28) - Segment 8, 1.4 cm (image 28)  - Segment 5/6, 2.7 cm (image 20)      Biliary: No intrahepatic or extrahepatic bile duct dilation.  Cholecystectomy.      Spleen: No mass. No splenomegaly.      Pancreas: No mass or duct dilation.      Adrenals: Normal.      Kidneys: No solid or cystic mass. No hydronephrosis.      GI tract: No bowel wall thickening or dilation.      Lymph nodes: Nonspecific portacaval lymph node measuring 0.8 cm  (series 1101, image 21).      Mesentery/peritoneum: No ascites or fluid collection.      Vasculature: No abdominal aortic aneurysm.      Bones/Lower chest: No suspicious bone lesion.No pleural  "effusion.      IMPRESSION:  Multiple (approximately 6) bilobar enhancing liver lesions,  concerning for metastases.      Nonspecific portacaval lymph node.      MACRO  None      Signed by: Susie Ambrosio 7/29/2024 9:09 AM  Dictation workstation:   [id number]         Assessment/Plan     Abnormal imaging   Patient with imaging that has showed concern of liver metastasis without clear primary source. Initial CT showed rectal wall thickening which could be proctitis vs malignancy vs overread. This was not seen on MRI liver. Patient has never had a colonoscopy before. Would highly recommend colonoscopy for patient to look for a primary colon source of possible malignancy.     I spent an extensive amount of time explaining concerns of possible malignancy to the patient, concerns of progression with any further delay in diagnosis, and the need for colonoscopy. I reviewed the risks/benefits of procedures with the patient including cardiopulmonary complications, perforation, and bleeding. Patient was rather hesitant to have a colonoscopy and states that he \"isnt convinced he needs one\" and \"he doesn't want to come out worse than he came in\". When asked to elaborate, he seems to be concerned about complications of procedures and mentioned getting a 2cd opinion. Fortunately, Dr. Holm was available and willing to speak with patient with me, and he also explained to the patient the concerns on imaging, need for colonoscopy, and risks/benefits of procedure. Patient eventually agreed to have procedure.     He will be scheduled in the endoscopy center next week. He also has an appointment with oncology (Dr. Turner) later next week.     Meds to hold: Jardiance x3 days         60 minutes spent in the total care of this patient        Kathie Hernandez, APRN-CNP   "

## 2024-08-13 NOTE — PATIENT INSTRUCTIONS
Thank you for coming to your appointment today   - You will be scheduled for a colonoscopy in the endoscopy center   - hold the jardiance for 3 days prior to procedure   - Please follow the bowel prep instructions given to you by the office.   - After your procedure, you can expect to speak to the physician to go over the initial results of the procedure.   - If any polyps are removed during your procedure or if any biopsies are obtained those specimens will go to the pathologists to review under the microscope. Once those results are available they will be sent to the physician electronically to review. These results will also be available to you at that time through the patient portal. These results will be reviewed by the physician and communicated back to you with final recommendations. If you have questions or need additional information I urge you to call the office at [phone], but we do ask for patience as the we are often with patients.   - You were also given information regarding the schedule for your procedure including the time that you need to arrive to the endoscopy unit.  You will also be contacted about 1 week prior to your procedure to confirm the final arrival time.  If you have questions about this or if you need to cancel or change this appointment please call my office at [phone].      Please call [phone] with any questions or concerns

## 2024-08-19 ENCOUNTER — ANESTHESIA EVENT (OUTPATIENT)
Dept: GASTROENTEROLOGY | Facility: HOSPITAL | Age: 74
End: 2024-08-19
Payer: MEDICARE

## 2024-08-20 ENCOUNTER — HOSPITAL ENCOUNTER (OUTPATIENT)
Dept: GASTROENTEROLOGY | Facility: HOSPITAL | Age: 74
Discharge: HOME | End: 2024-08-20
Payer: MEDICARE

## 2024-08-20 ENCOUNTER — ANESTHESIA (OUTPATIENT)
Dept: GASTROENTEROLOGY | Facility: HOSPITAL | Age: 74
End: 2024-08-20
Payer: MEDICARE

## 2024-08-20 VITALS
HEIGHT: 77 IN | BODY MASS INDEX: 26.57 KG/M2 | OXYGEN SATURATION: 100 % | TEMPERATURE: 98.8 F | WEIGHT: 225 LBS | RESPIRATION RATE: 16 BRPM | HEART RATE: 72 BPM | DIASTOLIC BLOOD PRESSURE: 83 MMHG | SYSTOLIC BLOOD PRESSURE: 153 MMHG

## 2024-08-20 DIAGNOSIS — K76.9 LIVER LESION: ICD-10-CM

## 2024-08-20 DIAGNOSIS — R93.89 ABNORMAL CT SCAN: ICD-10-CM

## 2024-08-20 PROCEDURE — 3700000001 HC GENERAL ANESTHESIA TIME - INITIAL BASE CHARGE

## 2024-08-20 PROCEDURE — 3700000002 HC GENERAL ANESTHESIA TIME - EACH INCREMENTAL 1 MINUTE

## 2024-08-20 PROCEDURE — 2500000004 HC RX 250 GENERAL PHARMACY W/ HCPCS (ALT 636 FOR OP/ED): Performed by: INTERNAL MEDICINE

## 2024-08-20 PROCEDURE — 45378 DIAGNOSTIC COLONOSCOPY: CPT | Performed by: INTERNAL MEDICINE

## 2024-08-20 PROCEDURE — 2500000005 HC RX 250 GENERAL PHARMACY W/O HCPCS: Performed by: NURSE ANESTHETIST, CERTIFIED REGISTERED

## 2024-08-20 PROCEDURE — 2500000004 HC RX 250 GENERAL PHARMACY W/ HCPCS (ALT 636 FOR OP/ED): Performed by: NURSE ANESTHETIST, CERTIFIED REGISTERED

## 2024-08-20 PROCEDURE — 7100000010 HC PHASE TWO TIME - EACH INCREMENTAL 1 MINUTE

## 2024-08-20 PROCEDURE — 7100000009 HC PHASE TWO TIME - INITIAL BASE CHARGE

## 2024-08-20 RX ORDER — LIDOCAINE HYDROCHLORIDE 20 MG/ML
INJECTION, SOLUTION INFILTRATION; PERINEURAL AS NEEDED
Status: DISCONTINUED | OUTPATIENT
Start: 2024-08-20 | End: 2024-08-20

## 2024-08-20 RX ORDER — SODIUM CHLORIDE 9 MG/ML
50 INJECTION, SOLUTION INTRAVENOUS CONTINUOUS
Status: DISCONTINUED | OUTPATIENT
Start: 2024-08-20 | End: 2024-08-21 | Stop reason: HOSPADM

## 2024-08-20 RX ORDER — PROPOFOL 10 MG/ML
INJECTION, EMULSION INTRAVENOUS AS NEEDED
Status: DISCONTINUED | OUTPATIENT
Start: 2024-08-20 | End: 2024-08-20

## 2024-08-20 SDOH — HEALTH STABILITY: MENTAL HEALTH: CURRENT SMOKER: 0

## 2024-08-20 ASSESSMENT — PAIN SCALES - GENERAL
PAINLEVEL_OUTOF10: 0 - NO PAIN
PAINLEVEL_OUTOF10: 0 - NO PAIN
PAIN_LEVEL: 1
PAINLEVEL_OUTOF10: 0 - NO PAIN

## 2024-08-20 ASSESSMENT — PAIN - FUNCTIONAL ASSESSMENT
PAIN_FUNCTIONAL_ASSESSMENT: 0-10

## 2024-08-20 NOTE — ANESTHESIA PREPROCEDURE EVALUATION
Patient: Juan Contrerasoda    Procedure Information       Date/Time: 08/20/24 0900    Scheduled providers: Rogelio Holm DO    Procedure: COLONOSCOPY    Location: [facility]            Relevant Problems   Anesthesia (within normal limits)      Cardiac   (+) AF (paroxysmal atrial fibrillation) (Multi)   (+) Hyperlipidemia      Endocrine   (+) Hypothyroidism   (+) Obesity   (+) Type 2 diabetes mellitus (Multi)      HEENT   (+) Seasonal allergies      Skin   (+) Eczema of hand       Clinical information reviewed:   Tobacco  Allergies  Meds   Med Hx  Surg Hx   Fam Hx  Soc Hx        NPO Detail:  NPO/Void Status  Date of Last Liquid: 08/20/24  Time of Last Liquid: 0715  Date of Last Solid: 08/18/24  Last Intake Type: Clear fluids         Physical Exam    Airway  Mallampati: IV  TM distance: >3 FB  Neck ROM: full     Cardiovascular - normal exam     Dental - normal exam     Pulmonary - normal exam     Abdominal            Anesthesia Plan    History of general anesthesia?: yes  History of complications of general anesthesia?: no    ASA 3     MAC     The patient is not a current smoker.    intravenous induction   Anesthetic plan and risks discussed with patient.    Plan discussed with CRNA.

## 2024-08-20 NOTE — ANESTHESIA POSTPROCEDURE EVALUATION
Patient: Juan Varela    Procedure Summary       Date: 08/20/24 Room / Location: [facility]    Anesthesia Start: 0853 Anesthesia Stop: 0919    Procedure: COLONOSCOPY Diagnosis:       Liver lesion      Abnormal CT scan    Scheduled Providers: Rogelio Holm DO Responsible Provider: ANNETTE Rivera    Anesthesia Type: MAC ASA Status: 3            Anesthesia Type: MAC    Vitals Value Taken Time   /62 08/20/24 0917   Temp 36.4 °C (97.5 °F) 08/20/24 0917   Pulse 67 08/20/24 0917   Resp 16 08/20/24 0917   SpO2 100 % 08/20/24 0917       Anesthesia Post Evaluation    Patient location during evaluation: PACU  Patient participation: complete - patient participated  Level of consciousness: awake and alert  Pain score: 1  Pain management: satisfactory to patient  Airway patency: patent  Cardiovascular status: acceptable and blood pressure returned to baseline  Respiratory status: acceptable, spontaneous ventilation and room air  Hydration status: acceptable  Postoperative Nausea and Vomiting: none        There were no known notable events for this encounter.

## 2024-08-22 ENCOUNTER — OFFICE VISIT (OUTPATIENT)
Dept: HEMATOLOGY/ONCOLOGY | Facility: CLINIC | Age: 74
End: 2024-08-22
Payer: MEDICARE

## 2024-08-22 VITALS
OXYGEN SATURATION: 98 % | HEART RATE: 79 BPM | WEIGHT: 227.96 LBS | SYSTOLIC BLOOD PRESSURE: 157 MMHG | HEIGHT: 74 IN | RESPIRATION RATE: 16 BRPM | DIASTOLIC BLOOD PRESSURE: 77 MMHG | BODY MASS INDEX: 29.26 KG/M2 | TEMPERATURE: 98.1 F

## 2024-08-22 DIAGNOSIS — K76.9 LIVER LESION: ICD-10-CM

## 2024-08-22 DIAGNOSIS — R16.0 LIVER MASS: ICD-10-CM

## 2024-08-22 LAB — PSA SERPL-MCNC: 6.33 NG/ML

## 2024-08-22 PROCEDURE — 1160F RVW MEDS BY RX/DR IN RCRD: CPT | Performed by: INTERNAL MEDICINE

## 2024-08-22 PROCEDURE — 1123F ACP DISCUSS/DSCN MKR DOCD: CPT | Performed by: INTERNAL MEDICINE

## 2024-08-22 PROCEDURE — 82378 CARCINOEMBRYONIC ANTIGEN: CPT | Mod: PORLAB | Performed by: INTERNAL MEDICINE

## 2024-08-22 PROCEDURE — 84153 ASSAY OF PSA TOTAL: CPT | Performed by: INTERNAL MEDICINE

## 2024-08-22 PROCEDURE — 1126F AMNT PAIN NOTED NONE PRSNT: CPT | Performed by: INTERNAL MEDICINE

## 2024-08-22 PROCEDURE — 99205 OFFICE O/P NEW HI 60 MIN: CPT | Performed by: INTERNAL MEDICINE

## 2024-08-22 PROCEDURE — 1159F MED LIST DOCD IN RCRD: CPT | Performed by: INTERNAL MEDICINE

## 2024-08-22 PROCEDURE — 82105 ALPHA-FETOPROTEIN SERUM: CPT | Mod: PORLAB | Performed by: INTERNAL MEDICINE

## 2024-08-22 PROCEDURE — 36415 COLL VENOUS BLD VENIPUNCTURE: CPT | Performed by: INTERNAL MEDICINE

## 2024-08-22 PROCEDURE — 99215 OFFICE O/P EST HI 40 MIN: CPT | Performed by: INTERNAL MEDICINE

## 2024-08-22 PROCEDURE — 86301 IMMUNOASSAY TUMOR CA 19-9: CPT | Mod: PORLAB | Performed by: INTERNAL MEDICINE

## 2024-08-22 ASSESSMENT — NCCN CANCER DISTRESS MANAGEMENT
NCCN EMOTIONAL CONCERNS: 1
NCCN SOCIAL CONCERNS: 5
NCCN PRACTICAL CONCERNS: 7

## 2024-08-22 ASSESSMENT — PAIN SCALES - GENERAL: PAINLEVEL: 0-NO PAIN

## 2024-08-22 NOTE — H&P (VIEW-ONLY)
Patient ID: Juan Varela is a 74 y.o. male.  Referring Physician: Gabo Wagoner DO  [id number]  Department [place] [address]  Primary Care Provider: Roseanna Durbin DO      Subjective    HPI   Juan Varela is a 74 y.o. male with PMH of afib, DM2, HTN, afib, and hypothyroidism who was   seen in ER 7/7/24 for near syncope. While in ER, he reported abdominal pain. CT A/P noted diffuse rectal wall thickening and multifocal ill-defined liver lesions c/f metastasis. He was discharged with follow up recommendations to oncology and GI.    GI evaluation done, 8/20/24 , colonoscopy normal no any abnormal pathology  7/26/24 , MRI of liver,    Patient is doing very well patient denies any headache no dizziness no nausea vomiting, no chest pain, no shortness of breath, nonspecific abdominal pain which happened couple of time, no diarrhea and constipation no rectal bleed something patient loose bowel movement, no dysuria and hematuria, history of weight loss now body weight is stable     He was hospitalized in June 2024 for afib, hypoglycemia, and near syncope. He is currently on sotolol. His most recent EKG 7/7/24 showed NSR. Qtc has returned to normal.                Review of Systems:  Review of Systems   Constitutional:  Negative for chills and fever.   HENT:  Negative for sore throat and trouble swallowing.    Respiratory:  Negative for cough and shortness of breath.    Cardiovascular:  Negative for chest pain and palpitations.   Gastrointestinal:       Endocrine: Negative for cold intolerance and heat intolerance.   Genitourinary:  Negative for difficulty urinating.   Musculoskeletal:  Negative for arthralgias and joint swelling.   Skin:  Negative for rash and wound.   Neurological:  Negative for dizziness and weakness.   Hematological:  Negative for adenopathy. Does not bruise/bleed easily.   Psychiatric/Behavioral:  Negative for confusion.          Medications:          Prior to  Admission medications    Medication Sig Start Date End Date Taking? Authorizing Provider   atorvastatin (Lipitor) 40 mg tablet TAKE 1 TABLET BY MOUTH EVERYDAY AT BEDTIME 3/8/24     Genesis Mancuso MD   empagliflozin (Jardiance) 25 mg Take 1 tablet (25 mg) by mouth once daily. 6/4/24 12/1/24   Genesis Mancuso MD   insulin glargine (Lantus) 100 unit/mL injection Inject 40 Units under the skin once daily in the morning. Take before meals. Take as directed per insulin instructions. Do not fill before June 29, 2024. 6/29/24     Rd Esquivel,    levothyroxine (Synthroid, Levoxyl) 150 mcg tablet TAKE 1 TABLET BY MOUTH EVERY DAY AS DIRECTED 5/24/24     Genesis Mancuso MD   lisinopril 2.5 mg tablet TAKE 1 TABLET BY MOUTH ONCE DAILY. 4/26/24     Genesis Mancuso MD   pioglitazone (Actos) 45 mg tablet TAKE 1 TABLET (45 MG) BY MOUTH ONCE DAILY 5/24/24 11/20/24   Genesis Mancuso MD   sotalol (Betapace) 120 mg tablet TAKE 1/2 TABLET (60 MG) BY MOUTH 2 TIMES A DAY. 3/6/24     Chet Ley PA-C         Allergies:  Patient has no known allergies.     Past Medical History:  He has a past medical history of Atrial fibrillation (Multi), Diabetes mellitus (Multi), and Hypertension.     Past Surgical History:  He has a past surgical history that includes Other surgical history (09/02/2021).     Social History:  He reports that he has never smoked. He has never been exposed to tobacco smoke. He has never used smokeless tobacco. He reports that he does not drink alcohol and does not use drugs.        Review of Systems - Oncology   Review of Systems:  Review of Systems   Constitutional:  Negative for chills and fever.   HENT:  Negative for sore throat and trouble swallowing.    Respiratory:  Negative for cough and shortness of breath.    Cardiovascular:  Negative for chest pain and palpitations.   Gastrointestinal:       Endocrine: Negative for cold intolerance and heat  "intolerance.   Genitourinary:  Negative for difficulty urinating.   Musculoskeletal:  Negative for arthralgias and joint swelling.   Skin:  Negative for rash and wound.   Neurological:  Negative for dizziness and weakness.   Hematological:  Negative for adenopathy. Does not bruise/bleed easily.   Psychiatric/Behavioral:  Negative for confusion.      Medications:          Prior to Admission medications    Medication Sig Start Date End Date Taking? Authorizing Provider   atorvastatin (Lipitor) 40 mg tablet TAKE 1 TABLET BY MOUTH EVERYDAY AT BEDTIME 3/8/24     Genesis Mancuso MD   empagliflozin (Jardiance) 25 mg Take 1 tablet (25 mg) by mouth once daily. 6/4/24 12/1/24   Genesis Mancuso MD   insulin glargine (Lantus) 100 unit/mL injection Inject 40 Units under the skin once daily in the morning. Take before meals. Take as directed per insulin instructions. Do not fill before June 29, 2024. 6/29/24     Rd Esquivel,    levothyroxine (Synthroid, Levoxyl) 150 mcg tablet TAKE 1 TABLET BY MOUTH EVERY DAY AS DIRECTED 5/24/24     Genesis Mancuso MD   lisinopril 2.5 mg tablet TAKE 1 TABLET BY MOUTH ONCE DAILY. 4/26/24     Genesis Mancuso MD   pioglitazone (Actos) 45 mg tablet TAKE 1 TABLET (45 MG) BY MOUTH ONCE DAILY 5/24/24 11/20/24   Genesis Mancuso MD   sotalol (Betapace) 120 mg tablet TAKE 1/2 TABLET (60 MG) BY MOUTH 2 TIMES A DAY. 3/6/24     Chet Ley PA-C      Allergies:  Patient has no known allergies.      Family History   Family history unknown: Yes     Juan Varela  reports that he has never smoked. He has never been exposed to tobacco smoke. He has never used smokeless tobacco.  He  reports no history of alcohol use.  He  reports no history of drug use.    Objective   BSA: 2.32 meters squared  /77 (BP Location: Left arm, Patient Position: Sitting)   Pulse 79   Temp 36.7 °C (98.1 °F) (Temporal)   Resp 16   Ht (S) 1.887 m (6' 2.29\")  "  Wt 103 kg (227 lb 15.3 oz)   SpO2 98%   BMI 29.04 kg/m²     Physical Exam  Vitals are stable    HEENT examination normal limit    Neck is supple, no carotid bruit, no thyromegaly no cervical lymphadenopathy    Lungs clear on the both side no wheezing    Heart with normal regular rhythm    Abdomen is soft, nontender no hepatosplenomegaly, no any other masses noted, normotonic bowel sound    Extremity patient with trace edema left lower extremity    Neuro examination nonfocal    Lymphatic no peripheral lymphadenopathy    Skin examination did show dry skin no rash no pigmented lesion    Labs  1 mo ago  (7/7/24) 1 mo ago  (6/28/24) 1 mo ago  (6/27/24) 2 mo ago  (5/31/24) 1 yr ago  (6/26/23) 1 yr ago  (6/26/23) 1 yr ago  (1/9/23)    Glucose  74 - 99 mg/dL 315 High  127 High  119 High  164 High  139 High  140 High  144 High    Sodium  136 - 145 mmol/L 137 140 139 141 141 140 139   Potassium  3.5 - 5.3 mmol/L 4.7 3.9 4.3 CM 4.0 4.0 4.0 4.1   Chloride  98 - 107 mmol/L 104 106 107 105 107 107 105   Bicarbonate  21 - 32 mmol/L 24 28 27 28 27 28 28   Anion Gap  10 - 20 mmol/L 14 10 9 Low  12 11 9 Low  10   Urea Nitrogen  6 - 23 mg/dL 21 15 18 16 13 14 15   Creatinine  0.50 - 1.30 mg/dL 1.27 0.87 0.99 1.02 1.01 1.06 0.97   eGFR  >60 mL/min/1.73m*2 59 Low  89 CM 79 CM 76 CM      Comment: Calculations of estimated GFR are performed using the 2021 CKD-EPI Study Refit equation without the race variable for the IDMS-Traceable creatinine methods.  https://jasn.asnjournals.org/content/early/2021/09/22/ASN.5631494103   Calcium  8.6 - 10.3 mg/dL 9.8 8.8 8.6 8.7 8.8 8.7 9.0   Albumin  3.4 - 5.0 g/dL 4.6  4.1 4.1  4.0    Alkaline Phosphatase  33 - 136 U/L 96  70 83  87    Total Protein  6.4 - 8.2 g/dL 7.6  6.6 6.4  6.4    AST  9 - 39 U/L 18  17 CM 13  14    Bilirubin, Total  0.0 - 1.2 mg/dL 1.1  0.5 0.7  0.8    ALT  10 - 52 U/L 25             Ref Range & Units 1 mo ago  (7/7/24) 1 mo ago  (6/27/24) 2 yr ago  (8/9/22) 2 yr  ago  (3/1/22)   WBC  4.4 - 11.3 x10*3/uL 13.1 High  10.2 6.3 R 7.7 R   nRBC  0.0 - 0.0 /100 WBCs 0.0 0.0     RBC  4.50 - 5.90 x10*6/uL 5.74 5.00 5.19 R 5.44 R   Hemoglobin  13.5 - 17.5 g/dL 15.8 13.6 15.1 16.1   Hematocrit  41.0 - 52.0 % 47.1 41.1 43.6 46.3   MCV  80 - 100 fL 82 82 84 85   MCH  26.0 - 34.0 pg 27.5 27.2     MCHC  32.0 - 36.0 g/dL 33.5 33.1 34.6 34.8   RDW  11.5 - 14.5 % 13.9 13.3 13.0 13.1   Platelets  150 - 450 x10*3/uL 243 188 198 R 199 R   Neutrophils %  40.0 - 80.0 % 84.5 81.4     Immature Granulocytes %, Automated  0.0 - 0.9 % 0.3 0.4 CM     Comment: Immature Granulocyte Count (IG) includes promyelocytes, myelocytes and metamyelocytes but does not include bands. Percent differential counts (%) should be interpreted in the context of the absolute cell counts (cells/UL).   Lymphocytes %  13.0 - 44.0 % 8.4 9.2     Monocytes %  2.0 - 10.0 % 5.7 6.8     Eosinophils %  0.0 - 6.0 % 0.8 1.7     Basophils %  0.0 - 2.0 % 0.3 0.5     Neutrophils Absolute  1.60 - 5.50 x10*3/uL 11.03 High       PSA 6.1  Radiology report  CT abdominal pelvis,Multifocal ill-defined lesions within the liver. Findings are  concerning for malignancy/metastasis. Dedicated contrast-enhanced  liver MRI is suggested for further evaluation.      Diffuse rectal wall thickening noted which could be related to  proctitis however correlation for neoplasia is suggested. Inspissated  stool within the rectum with mild inflammatory changes of the distal  colon noted. Correlation for proctocolitis also recommended    MRI of liver,Multiple (approximately 6) bilobar enhancing liver lesions,  concerning for metastases.  Performance Status: ECOG 0    Assessment/Plan      #1 multiple hepatic lesion, clinically suspicious for metastatic disease of unknown primary.  Physical examination unremarkable CAT scan abdominal pelvis did not show any other pathology, LFT within normal limit, colonoscopy normal and MRI result discussed with the patient.    For  further evaluation I will check CEA, CA 19-9, alpha-fetoprotein and schedule for CT-guided biopsy of hepatic lesion.  Plan discussed with the patient and he is in agreement.    Follow-up after liver biopsy.            Juan David Turner MD

## 2024-08-22 NOTE — PROGRESS NOTES
Patient ID: Juan Varela is a 74 y.o. male.  Referring Physician: Gabo Wagoner DO  [id number]  Department of [place] [address]  Primary Care Provider: Roseanna Durbin DO      Subjective    HPI   Juan Varela is a 74 y.o. male with PMH of afib, DM2, HTN, afib, and hypothyroidism who was   seen in ER 7/7/24 for near syncope. While in ER, he reported abdominal pain. CT A/P noted diffuse rectal wall thickening and multifocal ill-defined liver lesions c/f metastasis. He was discharged with follow up recommendations to oncology and GI.    GI evaluation done, 8/20/24 , colonoscopy normal no any abnormal pathology  7/26/24 , MRI of liver,    Patient is doing very well patient denies any headache no dizziness no nausea vomiting, no chest pain, no shortness of breath, nonspecific abdominal pain which happened couple of time, no diarrhea and constipation no rectal bleed something patient loose bowel movement, no dysuria and hematuria, history of weight loss now body weight is stable     He was hospitalized in June 2024 for afib, hypoglycemia, and near syncope. He is currently on sotolol. His most recent EKG 7/7/24 showed NSR. Qtc has returned to normal.                Review of Systems:  Review of Systems   Constitutional:  Negative for chills and fever.   HENT:  Negative for sore throat and trouble swallowing.    Respiratory:  Negative for cough and shortness of breath.    Cardiovascular:  Negative for chest pain and palpitations.   Gastrointestinal:       Endocrine: Negative for cold intolerance and heat intolerance.   Genitourinary:  Negative for difficulty urinating.   Musculoskeletal:  Negative for arthralgias and joint swelling.   Skin:  Negative for rash and wound.   Neurological:  Negative for dizziness and weakness.   Hematological:  Negative for adenopathy. Does not bruise/bleed easily.   Psychiatric/Behavioral:  Negative for confusion.          Medications:          Prior to  Admission medications    Medication Sig Start Date End Date Taking? Authorizing Provider   atorvastatin (Lipitor) 40 mg tablet TAKE 1 TABLET BY MOUTH EVERYDAY AT BEDTIME 3/8/24     Genesis Mancuso MD   empagliflozin (Jardiance) 25 mg Take 1 tablet (25 mg) by mouth once daily. 6/4/24 12/1/24   Genesis Mancuso MD   insulin glargine (Lantus) 100 unit/mL injection Inject 40 Units under the skin once daily in the morning. Take before meals. Take as directed per insulin instructions. Do not fill before June 29, 2024. 6/29/24     Rd Esquivel,    levothyroxine (Synthroid, Levoxyl) 150 mcg tablet TAKE 1 TABLET BY MOUTH EVERY DAY AS DIRECTED 5/24/24     Genesis Mancuso MD   lisinopril 2.5 mg tablet TAKE 1 TABLET BY MOUTH ONCE DAILY. 4/26/24     Genesis Mancuso MD   pioglitazone (Actos) 45 mg tablet TAKE 1 TABLET (45 MG) BY MOUTH ONCE DAILY 5/24/24 11/20/24   Genesis Mancuso MD   sotalol (Betapace) 120 mg tablet TAKE 1/2 TABLET (60 MG) BY MOUTH 2 TIMES A DAY. 3/6/24     Chet Ley PA-C         Allergies:  Patient has no known allergies.     Past Medical History:  He has a past medical history of Atrial fibrillation (Multi), Diabetes mellitus (Multi), and Hypertension.     Past Surgical History:  He has a past surgical history that includes Other surgical history (09/02/2021).     Social History:  He reports that he has never smoked. He has never been exposed to tobacco smoke. He has never used smokeless tobacco. He reports that he does not drink alcohol and does not use drugs.        Review of Systems - Oncology   Review of Systems:  Review of Systems   Constitutional:  Negative for chills and fever.   HENT:  Negative for sore throat and trouble swallowing.    Respiratory:  Negative for cough and shortness of breath.    Cardiovascular:  Negative for chest pain and palpitations.   Gastrointestinal:       Endocrine: Negative for cold intolerance and heat  "intolerance.   Genitourinary:  Negative for difficulty urinating.   Musculoskeletal:  Negative for arthralgias and joint swelling.   Skin:  Negative for rash and wound.   Neurological:  Negative for dizziness and weakness.   Hematological:  Negative for adenopathy. Does not bruise/bleed easily.   Psychiatric/Behavioral:  Negative for confusion.      Medications:          Prior to Admission medications    Medication Sig Start Date End Date Taking? Authorizing Provider   atorvastatin (Lipitor) 40 mg tablet TAKE 1 TABLET BY MOUTH EVERYDAY AT BEDTIME 3/8/24     Genesis Mancuso MD   empagliflozin (Jardiance) 25 mg Take 1 tablet (25 mg) by mouth once daily. 6/4/24 12/1/24   Genesis Mancuso MD   insulin glargine (Lantus) 100 unit/mL injection Inject 40 Units under the skin once daily in the morning. Take before meals. Take as directed per insulin instructions. Do not fill before June 29, 2024. 6/29/24     Rd Esquivel,    levothyroxine (Synthroid, Levoxyl) 150 mcg tablet TAKE 1 TABLET BY MOUTH EVERY DAY AS DIRECTED 5/24/24     Genesis Mancuso MD   lisinopril 2.5 mg tablet TAKE 1 TABLET BY MOUTH ONCE DAILY. 4/26/24     Genesis Mancuso MD   pioglitazone (Actos) 45 mg tablet TAKE 1 TABLET (45 MG) BY MOUTH ONCE DAILY 5/24/24 11/20/24   Genesis Mancuso MD   sotalol (Betapace) 120 mg tablet TAKE 1/2 TABLET (60 MG) BY MOUTH 2 TIMES A DAY. 3/6/24     Chet Ley PA-C      Allergies:  Patient has no known allergies.      Family History   Family history unknown: Yes     Juan Varela  reports that he has never smoked. He has never been exposed to tobacco smoke. He has never used smokeless tobacco.  He  reports no history of alcohol use.  He  reports no history of drug use.    Objective   BSA: 2.32 meters squared  /77 (BP Location: Left arm, Patient Position: Sitting)   Pulse 79   Temp 36.7 °C (98.1 °F) (Temporal)   Resp 16   Ht (S) 1.887 m (6' 2.29\")  "  Wt 103 kg (227 lb 15.3 oz)   SpO2 98%   BMI 29.04 kg/m²     Physical Exam  Vitals are stable    HEENT examination normal limit    Neck is supple, no carotid bruit, no thyromegaly no cervical lymphadenopathy    Lungs clear on the both side no wheezing    Heart with normal regular rhythm    Abdomen is soft, nontender no hepatosplenomegaly, no any other masses noted, normotonic bowel sound    Extremity patient with trace edema left lower extremity    Neuro examination nonfocal    Lymphatic no peripheral lymphadenopathy    Skin examination did show dry skin no rash no pigmented lesion    Labs  1 mo ago  (7/7/24) 1 mo ago  (6/28/24) 1 mo ago  (6/27/24) 2 mo ago  (5/31/24) 1 yr ago  (6/26/23) 1 yr ago  (6/26/23) 1 yr ago  (1/9/23)    Glucose  74 - 99 mg/dL 315 High  127 High  119 High  164 High  139 High  140 High  144 High    Sodium  136 - 145 mmol/L 137 140 139 141 141 140 139   Potassium  3.5 - 5.3 mmol/L 4.7 3.9 4.3 CM 4.0 4.0 4.0 4.1   Chloride  98 - 107 mmol/L 104 106 107 105 107 107 105   Bicarbonate  21 - 32 mmol/L 24 28 27 28 27 28 28   Anion Gap  10 - 20 mmol/L 14 10 9 Low  12 11 9 Low  10   Urea Nitrogen  6 - 23 mg/dL 21 15 18 16 13 14 15   Creatinine  0.50 - 1.30 mg/dL 1.27 0.87 0.99 1.02 1.01 1.06 0.97   eGFR  >60 mL/min/1.73m*2 59 Low  89 CM 79 CM 76 CM      Comment: Calculations of estimated GFR are performed using the 2021 CKD-EPI Study Refit equation without the race variable for the IDMS-Traceable creatinine methods.  https://jasn.asnjournals.org/content/early/2021/09/22/ASN.9869534700   Calcium  8.6 - 10.3 mg/dL 9.8 8.8 8.6 8.7 8.8 8.7 9.0   Albumin  3.4 - 5.0 g/dL 4.6  4.1 4.1  4.0    Alkaline Phosphatase  33 - 136 U/L 96  70 83  87    Total Protein  6.4 - 8.2 g/dL 7.6  6.6 6.4  6.4    AST  9 - 39 U/L 18  17 CM 13  14    Bilirubin, Total  0.0 - 1.2 mg/dL 1.1  0.5 0.7  0.8    ALT  10 - 52 U/L 25             Ref Range & Units 1 mo ago  (7/7/24) 1 mo ago  (6/27/24) 2 yr ago  (8/9/22) 2 yr  ago  (3/1/22)   WBC  4.4 - 11.3 x10*3/uL 13.1 High  10.2 6.3 R 7.7 R   nRBC  0.0 - 0.0 /100 WBCs 0.0 0.0     RBC  4.50 - 5.90 x10*6/uL 5.74 5.00 5.19 R 5.44 R   Hemoglobin  13.5 - 17.5 g/dL 15.8 13.6 15.1 16.1   Hematocrit  41.0 - 52.0 % 47.1 41.1 43.6 46.3   MCV  80 - 100 fL 82 82 84 85   MCH  26.0 - 34.0 pg 27.5 27.2     MCHC  32.0 - 36.0 g/dL 33.5 33.1 34.6 34.8   RDW  11.5 - 14.5 % 13.9 13.3 13.0 13.1   Platelets  150 - 450 x10*3/uL 243 188 198 R 199 R   Neutrophils %  40.0 - 80.0 % 84.5 81.4     Immature Granulocytes %, Automated  0.0 - 0.9 % 0.3 0.4 CM     Comment: Immature Granulocyte Count (IG) includes promyelocytes, myelocytes and metamyelocytes but does not include bands. Percent differential counts (%) should be interpreted in the context of the absolute cell counts (cells/UL).   Lymphocytes %  13.0 - 44.0 % 8.4 9.2     Monocytes %  2.0 - 10.0 % 5.7 6.8     Eosinophils %  0.0 - 6.0 % 0.8 1.7     Basophils %  0.0 - 2.0 % 0.3 0.5     Neutrophils Absolute  1.60 - 5.50 x10*3/uL 11.03 High       PSA 6.1  Radiology report  CT abdominal pelvis,Multifocal ill-defined lesions within the liver. Findings are  concerning for malignancy/metastasis. Dedicated contrast-enhanced  liver MRI is suggested for further evaluation.      Diffuse rectal wall thickening noted which could be related to  proctitis however correlation for neoplasia is suggested. Inspissated  stool within the rectum with mild inflammatory changes of the distal  colon noted. Correlation for proctocolitis also recommended    MRI of liver,Multiple (approximately 6) bilobar enhancing liver lesions,  concerning for metastases.  Performance Status: ECOG 0    Assessment/Plan      #1 multiple hepatic lesion, clinically suspicious for metastatic disease of unknown primary.  Physical examination unremarkable CAT scan abdominal pelvis did not show any other pathology, LFT within normal limit, colonoscopy normal and MRI result discussed with the patient.    For  further evaluation I will check CEA, CA 19-9, alpha-fetoprotein and schedule for CT-guided biopsy of hepatic lesion.  Plan discussed with the patient and he is in agreement.    Follow-up after liver biopsy.            Juan David Turner MD

## 2024-08-22 NOTE — PATIENT INSTRUCTIONS
New patient appointment for multiple liver lesions seen on CT scan and MRI.     You will be scheduled for a biopsy of liver lesions to determine cause.  Radiology department will call you to schedule biopsy.     Follow up with Dr. Turner about 2 weeks after biopsy to review results.

## 2024-08-23 LAB
AFP SERPL-MCNC: <4 NG/ML (ref 0–9)
CANCER AG19-9 SERPL-ACNC: 15.94 U/ML
CEA SERPL-MCNC: 1.1 UG/L

## 2024-08-30 DIAGNOSIS — E78.49 OTHER HYPERLIPIDEMIA: Chronic | ICD-10-CM

## 2024-09-04 NOTE — ED PROVIDER NOTES
HPI   Chief Complaint   Patient presents with    Syncope     C/O syncopal episode around noon when trying to have a bowel movement.       This is a 74-year-old male who presents to the emergency department for passing out.  Patient was having a bowel movement and then when he stood up he did pass out he had no chest pain or shortness of breath but did feel lightheaded prior to him passing out.  He has no complaints otherwise besides some abdominal cramping.              Patient History   Past Medical History:   Diagnosis Date    Atrial fibrillation (Multi)     Diabetes mellitus (Multi)     Hypertension      Past Surgical History:   Procedure Laterality Date    OTHER SURGICAL HISTORY  09/02/2021    Cholecystectomy     Family History   Family history unknown: Yes     Social History     Tobacco Use    Smoking status: Never     Passive exposure: Never    Smokeless tobacco: Never   Vaping Use    Vaping status: Never Used   Substance Use Topics    Alcohol use: Never    Drug use: Never       Physical Exam   ED Triage Vitals   Temperature Heart Rate Respirations BP   07/07/24 1515 07/07/24 1515 07/07/24 1515 07/07/24 1515   36.8 °C (98.2 °F) 61 20 170/75      Pulse Ox Temp Source Heart Rate Source Patient Position   07/07/24 1515 07/07/24 1515 07/07/24 1913 07/07/24 1515   100 % Tympanic Monitor Sitting      BP Location FiO2 (%)     07/07/24 1515 07/07/24 1515     Left arm 21 %       Physical Exam  Constitutional:       General: He is not in acute distress.  HENT:      Head: Normocephalic and atraumatic.      Right Ear: Tympanic membrane normal.      Left Ear: Tympanic membrane normal.      Mouth/Throat:      Mouth: Mucous membranes are moist.   Eyes:      Extraocular Movements: Extraocular movements intact.      Conjunctiva/sclera: Conjunctivae normal.      Pupils: Pupils are equal, round, and reactive to light.   Cardiovascular:      Rate and Rhythm: Normal rate and regular rhythm.      Heart sounds: No murmur  heard.  Pulmonary:      Effort: Pulmonary effort is normal. No respiratory distress.      Breath sounds: Normal breath sounds. No stridor. No wheezing or rales.   Abdominal:      General: Bowel sounds are normal. There is no distension.      Tenderness: There is abdominal tenderness. There is no guarding or rebound.   Musculoskeletal:         General: No swelling, tenderness or deformity. Normal range of motion.   Skin:     General: Skin is warm and dry.      Coloration: Skin is not jaundiced.      Findings: No bruising or lesion.   Neurological:      General: No focal deficit present.      Mental Status: He is alert and oriented to person, place, and time. Mental status is at baseline.      Cranial Nerves: No cranial nerve deficit.      Motor: No weakness.   Psychiatric:         Mood and Affect: Mood normal.           ED Course & MDM   ED Course as of 09/04/24 1158   Sun Jul 07, 2024   1716     IMPRESSION:  1.  No evidence of acute cardiopulmonary process.       [CF]   1853 IMPRESSION:  1.  No pulmonary emboli or acute chest pathology. Moderate coronary  artery atherosclerotic calcifications.  2. Partially visualized age-indeterminate possibly chronic height  loss of the superior endplate of L1 vertebral body. Correlation with  point tenderness recommended.   [CF]   2109 Reviewed patient discharge summary from June 28, 2024.    Diabetes, A-fib, diabetes in addition to echocardiogram performed June 27 showing EF of 62%. [WJ]   2155 Lactate: 1.3 [WJ]   2351 C. difficile, PCR: Not Detected [WJ]      ED Course User Index  [CF] Vianey Cardenas MD  [WJ] Gabo Wagoner DO         Diagnoses as of 09/04/24 1158   Liver lesion   Diarrhea, unspecified type   Vasovagal near syncope                 No data recorded                                 Medical Decision Making  74-year-old male who presents emergency department for syncope.  Vital signs are stable, EKG shows sinus rhythm at a rate of 65 bpm, SC interval on my  calculations within normal limits, normal intervals.  Patient's troponin has been negative x 2.  His BNP is also negative no signs of congestive heart failure.  His D-dimer was elevated so CT angio was also obtained he did have some abdominal discomfort so CT of his abdomen was also obtained.  He does have a leukocytosis but no signs of anemia patient has no electrolyte derangements and his urine does not appear infected.  He was signed out to oncoming provider pending CT images.        Procedure  Procedures     Vianey Cardenas MD  09/04/24 1916       Vianey Cardenas MD  09/08/24 1094

## 2024-09-09 ENCOUNTER — HOSPITAL ENCOUNTER (OUTPATIENT)
Dept: RADIOLOGY | Facility: HOSPITAL | Age: 74
Discharge: HOME | End: 2024-09-09
Payer: MEDICARE

## 2024-09-09 VITALS
SYSTOLIC BLOOD PRESSURE: 143 MMHG | HEART RATE: 63 BPM | TEMPERATURE: 97 F | DIASTOLIC BLOOD PRESSURE: 74 MMHG | RESPIRATION RATE: 16 BRPM | BODY MASS INDEX: 29.3 KG/M2 | OXYGEN SATURATION: 96 % | WEIGHT: 230 LBS

## 2024-09-09 DIAGNOSIS — R16.0 LIVER MASS: ICD-10-CM

## 2024-09-09 DIAGNOSIS — K76.9 LIVER LESION: ICD-10-CM

## 2024-09-09 PROCEDURE — 47000 NEEDLE BIOPSY OF LIVER PERQ: CPT | Performed by: RADIOLOGY

## 2024-09-09 PROCEDURE — 76942 ECHO GUIDE FOR BIOPSY: CPT | Performed by: RADIOLOGY

## 2024-09-09 PROCEDURE — 2500000004 HC RX 250 GENERAL PHARMACY W/ HCPCS (ALT 636 FOR OP/ED): Performed by: RADIOLOGY

## 2024-09-09 PROCEDURE — 7100000009 HC PHASE TWO TIME - INITIAL BASE CHARGE

## 2024-09-09 PROCEDURE — 76942 ECHO GUIDE FOR BIOPSY: CPT

## 2024-09-09 PROCEDURE — 99152 MOD SED SAME PHYS/QHP 5/>YRS: CPT | Performed by: RADIOLOGY

## 2024-09-09 PROCEDURE — 7100000010 HC PHASE TWO TIME - EACH INCREMENTAL 1 MINUTE

## 2024-09-09 PROCEDURE — 2500000005 HC RX 250 GENERAL PHARMACY W/O HCPCS: Performed by: RADIOLOGY

## 2024-09-09 PROCEDURE — 2720000007 HC OR 272 NO HCPCS

## 2024-09-09 RX ORDER — FENTANYL CITRATE 50 UG/ML
INJECTION, SOLUTION INTRAMUSCULAR; INTRAVENOUS
Status: COMPLETED | OUTPATIENT
Start: 2024-09-09 | End: 2024-09-09

## 2024-09-09 RX ORDER — LIDOCAINE HYDROCHLORIDE 20 MG/ML
INJECTION, SOLUTION EPIDURAL; INFILTRATION; INTRACAUDAL; PERINEURAL
Status: COMPLETED | OUTPATIENT
Start: 2024-09-09 | End: 2024-09-09

## 2024-09-09 RX ORDER — MIDAZOLAM HYDROCHLORIDE 1 MG/ML
INJECTION, SOLUTION INTRAMUSCULAR; INTRAVENOUS
Status: COMPLETED | OUTPATIENT
Start: 2024-09-09 | End: 2024-09-09

## 2024-09-09 ASSESSMENT — PAIN - FUNCTIONAL ASSESSMENT
PAIN_FUNCTIONAL_ASSESSMENT: 0-10

## 2024-09-09 ASSESSMENT — PAIN SCALES - GENERAL
PAINLEVEL_OUTOF10: 0 - NO PAIN

## 2024-09-09 NOTE — DISCHARGE INSTRUCTIONS
"If you have any questions, please call the Interventional Radiology Nurse Practitioner Marcia Nico at [phone] and leave a message. She will return your call the same day if calling before 3 PM M-F. If you call on the weekend you can expect a call back on Monday morning. You may also call the Cath Lab at [phone] M-F, 7-3:30 with any questions. Weekends and after hours please call your referring provider's office number to reach a physician on call.    Remove your biopsy dressing on 9/10/24 -24 hours after your procedure, clean the area with soap and water, rinse and dry. Do not submerge the area under water fro 5-7 days. Okay to shower.     Liver biopsy - Discharge instructions    The Basics  Written by the doctors and editors at [facility]  What is a liver biopsy? -- This is a procedure that checks an abnormal area of the liver. The liver is a big organ in the upper right side of the belly (figure 1). A biopsy can be done to diagnose a problem, monitor a known problem, or learn more about your liver.  There are different ways to do a liver biopsy. You might have had a:  ? Fine-needle biopsy - The doctor uses a needle to take a sample of tissue from the liver.  ? Transjugular biopsy - The doctor inserts a thin tube called a \"catheter\" into a vein in the neck. Then, they put a special needle through the catheter to get the tissue sample.  ? Surgical biopsy - The doctor makes a cut to get the tissue sample.  How do I care for myself at home? -- Ask the doctor or nurse what you should do when you go home. Make sure that you understand exactly what you need to do to care for yourself. Ask questions if there is anything you do not understand.  You should also:  ? Take all of your medicines as instructed. Make sure that you know when you should start taking any medicines you had stopped for the procedure.  ? Take care of your biopsy site - You might have stitches, skin staples, surgical glue, or a special skin tape " where the biopsy was done.  ? Keep the site dry and covered with a bandage for the first 1 to 2 days. Your doctor or nurse will tell you exactly how long to keep the area dry.  ? Once you no longer need to keep the site dry, gently wash it with soap and water whenever you take a shower. Do not put the site underwater, such as in a bath, pool, or lake. This can slow healing and raise your chance of getting an infection.  ? After you wash the site, pat it dry. Your doctor or nurse will tell you if you need to put an antibiotic ointment on it. They will also tell you if you need to cover the site with a bandage or gauze.  ? Always wash your hands before and after you touch your biopsy site or bandage.  ? Increase your activity slowly. The doctor might want you to avoid heavy activity for the next 5 to 7 days. This includes limiting lifting, sports, and activities that could cause rough contact to your belly.  ? Call your doctor or make an appointment to talk about your biopsy results. The results are usually available within a few days to a week.  When should I call the doctor? -- Call for emergency help right away (in the [place] and [place], call 9-1-1) if:  ? You feel very dizzy or pass out.  Call for advice if:  ? You have a fever of 100.4°F (38°C) or higher, or chills.  ? You have drainage, redness, or swelling around the biopsy site.  ? You have severe pain at the biopsy site or in your shoulder.  ? You have chest pain or palpitations, or you feel short of breath.  ? You have bleeding from the biopsy site.  ? You have belly pain.  ? You have weakness or sweating.  ? Have a bowel movement with blood, or a bowel movement that is black and looks like tar.  All topics are updated as new evidence becomes available and our peer review process is complete.  This topic retrieved from [employer] on: May 30, 2024.  Topic 040013 Version 1.0  Release: 32.5.3 - C32.150  © 2024 UpToDate, Inc. and/or its affiliates. All rights  reserved.  figure 1: Organs inside the abdomen (belly)    Consumer Information Use and Disclaimer  Disclaimer: This generalized information is a limited summary of diagnosis, treatment, and/or medication information. It is not meant to be comprehensive and should be used as a tool to help the user understand and/or assess potential diagnostic and treatment options. It does NOT include all information about conditions, treatments, medications, side effects, or risks that may apply to a specific patient. It is not intended to be medical advice or a substitute for the medical advice, diagnosis, or treatment of a health care provider based on the health care provider's examination and assessment of a patient's specific and unique circumstances. Patients must speak with a health care provider for complete information about their health, medical questions, and treatment options, including any risks or benefits regarding use of medications. This information does not endorse any treatments or medications as safe, effective, or approved for treating a specific patient. UpToDate, Inc. and its affiliates disclaim any warranty or liability relating to this information or the use thereof.The use of this information is governed by the Terms of Use, available at https://www.[employer].com/en/know/clinical-effectiveness-terms. 2024© UpToDate, Inc. and its affiliates and/or licensors. All rights reserved.  © 2024 Up[employer]Date, Inc. and/or its affiliates. All rights reserved.

## 2024-09-09 NOTE — PRE-SEDATION DOCUMENTATION
Interventional Radiology Preprocedure Note    Juan Varela   Indication for procedure: Diagnoses of Liver lesion and Liver mass were pertinent to this visit. Patient is here today for a Liver biopsy    Relevant review of systems: NA      /76   Pulse 71   Temp 36.1 °C (97 °F) (Tympanic)   Resp 14   Wt 104 kg (230 lb)   SpO2 96%   BMI 29.30 kg/m²    Relevant Labs:   Lab Results   Component Value Date    CREATININE 1.27 07/07/2024    EGFR 59 (L) 07/07/2024       Planned Sedation/Anesthesia: Moderate    Airway assessment: normal    Physical Exam  Constitutional:       Appearance: Normal appearance.   HENT:      Head: Normocephalic.      Mouth/Throat:      Mouth: Mucous membranes are moist.      Pharynx: Oropharynx is clear.   Eyes:      General: No scleral icterus.  Cardiovascular:      Rate and Rhythm: Normal rate and regular rhythm.      Heart sounds: Normal heart sounds. No murmur heard.     No gallop.   Pulmonary:      Effort: Pulmonary effort is normal.      Breath sounds: Normal breath sounds. No wheezing, rhonchi or rales.   Musculoskeletal:      Right lower leg: No edema.      Left lower leg: Edema present.   Skin:     General: Skin is warm and dry.      Capillary Refill: Capillary refill takes less than 2 seconds.   Neurological:      General: No focal deficit present.      Mental Status: He is alert and oriented to person, place, and time.   Psychiatric:         Mood and Affect: Mood normal.         Behavior: Behavior normal.         Thought Content: Thought content normal.         Judgment: Judgment normal.         Mallampati: II (hard and soft palate, upper portion of tonsils and uvula visible)    ASA Score: ASA 2 - Patient with mild systemic disease with no functional limitations    Benefits, risks and alternatives of procedure and planned sedation have been discussed with the patient and/or their representative. All questions answered and they agree to proceed.     ISAAC ROBERTS

## 2024-09-09 NOTE — POST-PROCEDURE NOTE
Interventional Radiology Brief Postprocedure Note    Attending: Ren Aguilar MD      Assistant: none    Diagnosis: liver masses    Description of procedure: liver mass biopsy     Anesthesia:  Local, mod sed    Complications: None    Estimated Blood Loss: minimal      specimens collected  3 2cm 18ga cores    See detailed result report with images in PACS.    The patient tolerated the procedure well without incident or complication.

## 2024-09-09 NOTE — NURSING NOTE
Final access site assessment WNL, no oozing or hematoma, distal pulse 2+. Dressing clean, dry, and intact. IV removed and dressing applied.     Homegoing instructions specific to procedure given, patient verbalized understanding.  Discharge criteria met: patient easily arousable, responding appropriately. Vital signs +/- 20% of preprocedure baseline. Significant complications absent. Ambulates without dizziness. Pulse ox > 92% on room air or baseline O2.     Patient discharged to home, accompanied by family. Discharged via wheelchair.   \

## 2024-09-11 RX ORDER — SOTALOL HYDROCHLORIDE 120 MG/1
TABLET ORAL
Qty: 90 TABLET | Refills: 2 | Status: SHIPPED | OUTPATIENT
Start: 2024-09-11

## 2024-09-16 LAB
LAB AP ASR DISCLAIMER: NORMAL
LABORATORY COMMENT REPORT: NORMAL
PATH REPORT.FINAL DX SPEC: NORMAL
PATH REPORT.GROSS SPEC: NORMAL
PATH REPORT.RELEVANT HX SPEC: NORMAL
PATH REPORT.TOTAL CANCER: NORMAL

## 2024-09-19 ENCOUNTER — OFFICE VISIT (OUTPATIENT)
Dept: HEMATOLOGY/ONCOLOGY | Facility: CLINIC | Age: 74
End: 2024-09-19
Payer: MEDICARE

## 2024-09-19 VITALS
DIASTOLIC BLOOD PRESSURE: 74 MMHG | RESPIRATION RATE: 16 BRPM | TEMPERATURE: 98.1 F | WEIGHT: 230.6 LBS | HEIGHT: 74 IN | BODY MASS INDEX: 29.59 KG/M2 | OXYGEN SATURATION: 98 % | SYSTOLIC BLOOD PRESSURE: 137 MMHG | HEART RATE: 72 BPM

## 2024-09-19 DIAGNOSIS — K76.9 LIVER LESION: ICD-10-CM

## 2024-09-19 DIAGNOSIS — R16.0 LIVER MASS: ICD-10-CM

## 2024-09-19 DIAGNOSIS — D3A.8 NEUROENDOCRINE TUMOR: ICD-10-CM

## 2024-09-19 DIAGNOSIS — N40.0 PROSTATE ENLARGEMENT: ICD-10-CM

## 2024-09-19 LAB
BASOPHILS # BLD AUTO: 0.03 X10*3/UL (ref 0–0.1)
BASOPHILS NFR BLD AUTO: 0.5 %
EOSINOPHIL # BLD AUTO: 0.16 X10*3/UL (ref 0–0.4)
EOSINOPHIL NFR BLD AUTO: 2.5 %
ERYTHROCYTE [DISTWIDTH] IN BLOOD BY AUTOMATED COUNT: 13.7 % (ref 11.5–14.5)
HCT VFR BLD AUTO: 37.4 % (ref 41–52)
HGB BLD-MCNC: 12.1 G/DL (ref 13.5–17.5)
IMM GRANULOCYTES # BLD AUTO: 0.01 X10*3/UL (ref 0–0.5)
IMM GRANULOCYTES NFR BLD AUTO: 0.2 % (ref 0–0.9)
LDH SERPL L TO P-CCNC: 160 U/L (ref 84–246)
LYMPHOCYTES # BLD AUTO: 1.15 X10*3/UL (ref 0.8–3)
LYMPHOCYTES NFR BLD AUTO: 18.2 %
MCH RBC QN AUTO: 26.2 PG (ref 26–34)
MCHC RBC AUTO-ENTMCNC: 32.4 G/DL (ref 32–36)
MCV RBC AUTO: 81 FL (ref 80–100)
MONOCYTES # BLD AUTO: 0.48 X10*3/UL (ref 0.05–0.8)
MONOCYTES NFR BLD AUTO: 7.6 %
NEUTROPHILS # BLD AUTO: 4.5 X10*3/UL (ref 1.6–5.5)
NEUTROPHILS NFR BLD AUTO: 71 %
PLATELET # BLD AUTO: 171 X10*3/UL (ref 150–450)
PSA SERPL-MCNC: 6.53 NG/ML
RBC # BLD AUTO: 4.61 X10*6/UL (ref 4.5–5.9)
WBC # BLD AUTO: 6.3 X10*3/UL (ref 4.4–11.3)

## 2024-09-19 PROCEDURE — 82378 CARCINOEMBRYONIC ANTIGEN: CPT | Mod: PORLAB | Performed by: INTERNAL MEDICINE

## 2024-09-19 PROCEDURE — 99215 OFFICE O/P EST HI 40 MIN: CPT | Performed by: INTERNAL MEDICINE

## 2024-09-19 PROCEDURE — 83615 LACTATE (LD) (LDH) ENZYME: CPT | Performed by: INTERNAL MEDICINE

## 2024-09-19 PROCEDURE — 84153 ASSAY OF PSA TOTAL: CPT | Performed by: INTERNAL MEDICINE

## 2024-09-19 PROCEDURE — 85025 COMPLETE CBC W/AUTO DIFF WBC: CPT | Performed by: INTERNAL MEDICINE

## 2024-09-19 PROCEDURE — 36415 COLL VENOUS BLD VENIPUNCTURE: CPT | Performed by: INTERNAL MEDICINE

## 2024-09-19 ASSESSMENT — PAIN SCALES - GENERAL: PAINLEVEL: 0-NO PAIN

## 2024-09-19 NOTE — PATIENT INSTRUCTIONS
Liver biopsy report reviewed, shows neuroendocrine tumor.     Special scan (net spot) will be done to look for neuroendocrine tumor cells throughout the body.     Follow up with Dr. Turner after scan to review results and discuss plan.

## 2024-09-19 NOTE — PROGRESS NOTES
Patient ID: Juan Varela is a 74 y.o. male.  Referring Physician: Juan David Turner MD  [address], [address]  [place] [phone]  Primary Care Provider: Roseanna Durbin DO  Oncology history    #1 metastatic neuroendocrine tumor  Presented with syncope, CAT scan of chest abdomen pelvis did show rectal wall thickening and ill-defined liver lesions  8/20/24 , colonoscopy normal no any abnormal pathology  7/26/24 , MRI of liver,Multiple (approximately 6) bilobar enhancing liver lesions, concerning for metastases.  9/9/24 , liver biopsy, well-differentiated neuroendocrine tumor involving liver, WHO grade 2  Immunostains show that the tumor cells are positive for CKAE1/3, synaptophysin, chromogranin and INSM1, while negative for GATA3, NKX3.1, TTF-1, CK7, and CK20. The immunoprofile is consistent with a neuroendocrine neoplasm.   The ki-67 index is 3.6%   Subjective    HPI   Juan Varela is a 74 y.o. male with PMH of afib, DM2, HTN, afib, and hypothyroidism who was   seen in ER 7/7/24 for near syncope. While in ER, he reported abdominal pain. CT A/P noted diffuse rectal wall thickening and multifocal ill-defined liver lesions c/f metastasis. He was discharged with follow up recommendations to oncology and GI.    GI evaluation done, 8/20/24 , colonoscopy normal no any abnormal pathology  7/26/24 , MRI of liver,Multiple (approximately 6) bilobar enhancing liver lesions,  concerning for metastases.    Patient is doing very well patient denies any headache no dizziness no nausea vomiting, no chest pain, no shortness of breath, nonspecific abdominal pain which happened couple of time, no diarrhea and constipation no rectal bleed something patient loose bowel movement, no dysuria and hematuria, history of weight loss now body weight is stable     He was hospitalized in June 2024 for afib, hypoglycemia, and near syncope. He is currently on sotolol. His most recent EKG 7/7/24 showed NSR. Qtc has  returned to normal.       Interval history  CAT scan of chest abdominal pelvis did show multiple hepatic lesion which were confirmed by MRI.  Liver biopsy done pathology positive for well-differentiated neuroendocrine tumor.  Pathology result discussed with the patient in detail.    No chest pain no shortness of breath, no skin rash, no diarrhea, no hot flashes patient is asymptomatic.     Review of Systems:  Review of Systems   Constitutional:  Negative for chills and fever.   HENT:  Negative for sore throat and trouble swallowing.    Respiratory:  Negative for cough and shortness of breath.    Cardiovascular:  Negative for chest pain and palpitations.   Gastrointestinal:       Endocrine: Negative for cold intolerance and heat intolerance.   Genitourinary:  Negative for difficulty urinating.   Musculoskeletal:  Negative for arthralgias and joint swelling.   Skin:  Negative for rash and wound.   Neurological:  Negative for dizziness and weakness.   Hematological:  Negative for adenopathy. Does not bruise/bleed easily.   Psychiatric/Behavioral:  Negative for confusion.          Medications:          Prior to Admission medications    Medication Sig Start Date End Date Taking? Authorizing Provider   atorvastatin (Lipitor) 40 mg tablet TAKE 1 TABLET BY MOUTH EVERYDAY AT BEDTIME 3/8/24     Genesis Mancuso MD   empagliflozin (Jardiance) 25 mg Take 1 tablet (25 mg) by mouth once daily. 6/4/24 12/1/24   Genesis Mancuso MD   insulin glargine (Lantus) 100 unit/mL injection Inject 40 Units under the skin once daily in the morning. Take before meals. Take as directed per insulin instructions. Do not fill before June 29, 2024. 6/29/24     Rd Esquivel,    levothyroxine (Synthroid, Levoxyl) 150 mcg tablet TAKE 1 TABLET BY MOUTH EVERY DAY AS DIRECTED 5/24/24     Genesis Mancuso MD   lisinopril 2.5 mg tablet TAKE 1 TABLET BY MOUTH ONCE DAILY. 4/26/24     Genesis Mancuso MD    pioglitazone (Actos) 45 mg tablet TAKE 1 TABLET (45 MG) BY MOUTH ONCE DAILY 5/24/24 11/20/24   Genesis Mancuso MD   sotalol (Betapace) 120 mg tablet TAKE 1/2 TABLET (60 MG) BY MOUTH 2 TIMES A DAY. 3/6/24     Chet Ley PA-C         Allergies:  Patient has no known allergies.     Past Medical History:  He has a past medical history of Atrial fibrillation (Multi), Diabetes mellitus (Multi), and Hypertension.     Past Surgical History:  He has a past surgical history that includes Other surgical history (09/02/2021).     Social History:  He reports that he has never smoked. He has never been exposed to tobacco smoke. He has never used smokeless tobacco. He reports that he does not drink alcohol and does not use drugs.        Review of Systems - Oncology   Review of Systems:  Review of Systems   Constitutional:  Negative for chills and fever.   HENT:  Negative for sore throat and trouble swallowing.    Respiratory:  Negative for cough and shortness of breath.    Cardiovascular:  Negative for chest pain and palpitations.   Gastrointestinal:       Endocrine: Negative for cold intolerance and heat intolerance.   Genitourinary:  Negative for difficulty urinating.   Musculoskeletal:  Negative for arthralgias and joint swelling.   Skin:  Negative for rash and wound.   Neurological:  Negative for dizziness and weakness.   Hematological:  Negative for adenopathy. Does not bruise/bleed easily.   Psychiatric/Behavioral:  Negative for confusion.      Medications:          Prior to Admission medications    Medication Sig Start Date End Date Taking? Authorizing Provider   atorvastatin (Lipitor) 40 mg tablet TAKE 1 TABLET BY MOUTH EVERYDAY AT BEDTIME 3/8/24     Genesis Mancuso MD   empagliflozin (Jardiance) 25 mg Take 1 tablet (25 mg) by mouth once daily. 6/4/24 12/1/24   Genesis Mancuso MD   insulin glargine (Lantus) 100 unit/mL injection Inject 40 Units under the skin once daily in the  "morning. Take before meals. Take as directed per insulin instructions. Do not fill before June 29, 2024. 6/29/24     Rd Esquivel,    levothyroxine (Synthroid, Levoxyl) 150 mcg tablet TAKE 1 TABLET BY MOUTH EVERY DAY AS DIRECTED 5/24/24     Genesis Mancuso MD   lisinopril 2.5 mg tablet TAKE 1 TABLET BY MOUTH ONCE DAILY. 4/26/24     Genesis Mancuso MD   pioglitazone (Actos) 45 mg tablet TAKE 1 TABLET (45 MG) BY MOUTH ONCE DAILY 5/24/24 11/20/24   Genesis Mancuso MD   sotalol (Betapace) 120 mg tablet TAKE 1/2 TABLET (60 MG) BY MOUTH 2 TIMES A DAY. 3/6/24     Chet Ley PA-C      Allergies:  Patient has no known allergies.      Family History   Family history unknown: Yes     Juan Varela  reports that he has never smoked. He has never been exposed to tobacco smoke. He has never used smokeless tobacco.  He  reports no history of alcohol use.  He  reports no history of drug use.    Objective   BSA: 2.32 meters squared  /77 (BP Location: Left arm, Patient Position: Sitting)   Pulse 79   Temp 36.7 °C (98.1 °F) (Temporal)   Resp 16   Ht (S) 1.887 m (6' 2.29\")   Wt 103 kg (227 lb 15.3 oz)   SpO2 98%   BMI 29.04 kg/m²     Physical Exam  Vitals are stable    HEENT examination normal limit    Neck is supple, no carotid bruit, no thyromegaly no cervical lymphadenopathy    Lungs clear on the both side no wheezing    Heart with normal regular rhythm    Abdomen is soft, nontender no hepatosplenomegaly, no any other masses noted, normotonic bowel sound    Extremity patient with trace edema left lower extremity    Neuro examination nonfocal    Lymphatic no peripheral lymphadenopathy    Skin examination did show dry skin no rash no pigmented lesion    Labs  1 mo ago  (7/7/24) 1 mo ago  (6/28/24) 1 mo ago  (6/27/24) 2 mo ago  (5/31/24) 1 yr ago  (6/26/23) 1 yr ago  (6/26/23) 1 yr ago  (1/9/23)    Glucose  74 - 99 mg/dL 315 High  127 High  119 High  164 High  139 High  140 " High  144 High    Sodium  136 - 145 mmol/L 137 140 139 141 141 140 139   Potassium  3.5 - 5.3 mmol/L 4.7 3.9 4.3 CM 4.0 4.0 4.0 4.1   Chloride  98 - 107 mmol/L 104 106 107 105 107 107 105   Bicarbonate  21 - 32 mmol/L 24 28 27 28 27 28 28   Anion Gap  10 - 20 mmol/L 14 10 9 Low  12 11 9 Low  10   Urea Nitrogen  6 - 23 mg/dL 21 15 18 16 13 14 15   Creatinine  0.50 - 1.30 mg/dL 1.27 0.87 0.99 1.02 1.01 1.06 0.97   eGFR  >60 mL/min/1.73m*2 59 Low  89 CM 79 CM 76 CM      Comment: Calculations of estimated GFR are performed using the 2021 CKD-EPI Study Refit equation without the race variable for the IDMS-Traceable creatinine methods.  https://jasn.asnjournals.org/content/early/2021/09/22/ASN.8835146764   Calcium  8.6 - 10.3 mg/dL 9.8 8.8 8.6 8.7 8.8 8.7 9.0   Albumin  3.4 - 5.0 g/dL 4.6  4.1 4.1  4.0    Alkaline Phosphatase  33 - 136 U/L 96  70 83  87    Total Protein  6.4 - 8.2 g/dL 7.6  6.6 6.4  6.4    AST  9 - 39 U/L 18  17 CM 13  14    Bilirubin, Total  0.0 - 1.2 mg/dL 1.1  0.5 0.7  0.8    ALT  10 - 52 U/L 25             Ref Range & Units 1 mo ago  (7/7/24) 1 mo ago  (6/27/24) 2 yr ago  (8/9/22) 2 yr ago  (3/1/22)   WBC  4.4 - 11.3 x10*3/uL 13.1 High  10.2 6.3 R 7.7 R   nRBC  0.0 - 0.0 /100 WBCs 0.0 0.0     RBC  4.50 - 5.90 x10*6/uL 5.74 5.00 5.19 R 5.44 R   Hemoglobin  13.5 - 17.5 g/dL 15.8 13.6 15.1 16.1   Hematocrit  41.0 - 52.0 % 47.1 41.1 43.6 46.3   MCV  80 - 100 fL 82 82 84 85   MCH  26.0 - 34.0 pg 27.5 27.2     MCHC  32.0 - 36.0 g/dL 33.5 33.1 34.6 34.8   RDW  11.5 - 14.5 % 13.9 13.3 13.0 13.1   Platelets  150 - 450 x10*3/uL 243 188 198 R 199 R   Neutrophils %  40.0 - 80.0 % 84.5 81.4     Immature Granulocytes %, Automated  0.0 - 0.9 % 0.3 0.4 CM     Comment: Immature Granulocyte Count (IG) includes promyelocytes, myelocytes and metamyelocytes but does not include bands. Percent differential counts (%) should be interpreted in the context of the absolute cell counts (cells/UL).   Lymphocytes %  13.0 - 44.0 %  8.4 9.2     Monocytes %  2.0 - 10.0 % 5.7 6.8     Eosinophils %  0.0 - 6.0 % 0.8 1.7     Basophils %  0.0 - 2.0 % 0.3 0.5     Neutrophils Absolute  1.60 - 5.50 x10*3/uL 11.03 High       PSA 6.1  Radiology report  CT abdominal pelvis,Multifocal ill-defined lesions within the liver. Findings are  concerning for malignancy/metastasis. Dedicated contrast-enhanced  liver MRI is suggested for further evaluation.      Diffuse rectal wall thickening noted which could be related to  proctitis however correlation for neoplasia is suggested. Inspissated  stool within the rectum with mild inflammatory changes of the distal  colon noted. Correlation for proctocolitis also recommended    MRI of liver,Multiple (approximately 6) bilobar enhancing liver lesions,  concerning for metastases.  Performance Status: ECOG 0  Pathology  FINAL DIAGNOSIS   A.  Liver, mass, biopsy:  -Well-differentiated neuroendocrine tumor involving the liver, WHO grade 2 (see comment).     Comment:  Immunostains show that the tumor cells are positive for CKAE1/3, synaptophysin, chromogranin and INSM1, while negative for GATA3, NKX3.1, TTF-1, CK7, and CK20.  The immunoprofile is consistent with a neuroendocrine neoplasm. The ki-67 index is 3.6%     Assessment/Plan      ##1 metastatic neuroendocrine tumor  Presented with syncope, CAT scan of chest abdomen pelvis did show rectal wall thickening and ill-defined liver lesions  8/20/24 , colonoscopy normal no any abnormal pathology  7/26/24 , MRI of liver,Multiple (approximately 6) bilobar enhancing liver lesions, concerning for metastases.  9/9/24 , liver biopsy, well-differentiated neuroendocrine tumor involving liver, WHO grade 2  Immunostains show that the tumor cells are positive for CKAE1/3, synaptophysin, chromogranin and INSM1, while negative for GATA3, NKX3.1, TTF-1, CK7, and CK20. The immunoprofile is consistent with a neuroendocrine neoplasm.   The ki-67 index is 3.6%       Pathology report discussed  with patient with very slow process.  Patient is asymptomatic.  Mediastinal up to 5-10 negative.  For further evaluation I will schedule for Netspot scan and follow-up after scan study.  I will also check serum chromogranin level    Time spent 40 minutes  Juan David Turner MD

## 2024-09-20 LAB — CEA SERPL-MCNC: 1.7 UG/L

## 2024-10-03 ENCOUNTER — HOSPITAL ENCOUNTER (OUTPATIENT)
Dept: RADIOLOGY | Facility: HOSPITAL | Age: 74
Discharge: HOME | End: 2024-10-03
Payer: MEDICARE

## 2024-10-03 DIAGNOSIS — R16.0 LIVER MASS: ICD-10-CM

## 2024-10-03 DIAGNOSIS — D3A.8 NEUROENDOCRINE TUMOR: ICD-10-CM

## 2024-10-03 DIAGNOSIS — K76.9 LIVER LESION: ICD-10-CM

## 2024-10-03 PROCEDURE — 3430000001 HC RX 343 DIAGNOSTIC RADIOPHARMACEUTICALS: Performed by: INTERNAL MEDICINE

## 2024-10-03 PROCEDURE — 78815 PET IMAGE W/CT SKULL-THIGH: CPT | Mod: PI

## 2024-10-03 PROCEDURE — A9587 GALLIUM GA-68: HCPCS | Performed by: INTERNAL MEDICINE

## 2024-10-17 ENCOUNTER — TELEPHONE (OUTPATIENT)
Dept: PRIMARY CARE | Facility: CLINIC | Age: 74
End: 2024-10-17

## 2024-10-17 ENCOUNTER — OFFICE VISIT (OUTPATIENT)
Dept: HEMATOLOGY/ONCOLOGY | Facility: CLINIC | Age: 74
End: 2024-10-17
Payer: MEDICARE

## 2024-10-17 VITALS
RESPIRATION RATE: 16 BRPM | TEMPERATURE: 97.7 F | WEIGHT: 228.29 LBS | BODY MASS INDEX: 29.3 KG/M2 | OXYGEN SATURATION: 100 % | HEIGHT: 74 IN | SYSTOLIC BLOOD PRESSURE: 139 MMHG | DIASTOLIC BLOOD PRESSURE: 70 MMHG | HEART RATE: 71 BPM

## 2024-10-17 DIAGNOSIS — R16.0 LIVER MASS: ICD-10-CM

## 2024-10-17 DIAGNOSIS — K76.9 LIVER LESION: ICD-10-CM

## 2024-10-17 DIAGNOSIS — N40.0 PROSTATE ENLARGEMENT: ICD-10-CM

## 2024-10-17 DIAGNOSIS — D3A.8 NEUROENDOCRINE TUMOR: Primary | ICD-10-CM

## 2024-10-17 LAB
ALBUMIN SERPL BCP-MCNC: 4 G/DL (ref 3.4–5)
ALP SERPL-CCNC: 89 U/L (ref 33–136)
ALT SERPL W P-5'-P-CCNC: 20 U/L (ref 10–52)
ANION GAP SERPL CALC-SCNC: 10 MMOL/L (ref 10–20)
AST SERPL W P-5'-P-CCNC: 16 U/L (ref 9–39)
BASOPHILS # BLD AUTO: 0.03 X10*3/UL (ref 0–0.1)
BASOPHILS NFR BLD AUTO: 0.4 %
BILIRUB SERPL-MCNC: 0.6 MG/DL (ref 0–1.2)
BUN SERPL-MCNC: 20 MG/DL (ref 6–23)
CALCIUM SERPL-MCNC: 8.5 MG/DL (ref 8.6–10.3)
CHLORIDE SERPL-SCNC: 102 MMOL/L (ref 98–107)
CO2 SERPL-SCNC: 26 MMOL/L (ref 21–32)
CREAT SERPL-MCNC: 1.17 MG/DL (ref 0.5–1.3)
EGFRCR SERPLBLD CKD-EPI 2021: 65 ML/MIN/1.73M*2
EOSINOPHIL # BLD AUTO: 0.23 X10*3/UL (ref 0–0.4)
EOSINOPHIL NFR BLD AUTO: 3.4 %
ERYTHROCYTE [DISTWIDTH] IN BLOOD BY AUTOMATED COUNT: 13.7 % (ref 11.5–14.5)
GLUCOSE SERPL-MCNC: 484 MG/DL (ref 74–99)
HCT VFR BLD AUTO: 38.5 % (ref 41–52)
HGB BLD-MCNC: 12 G/DL (ref 13.5–17.5)
IMM GRANULOCYTES # BLD AUTO: 0 X10*3/UL (ref 0–0.5)
IMM GRANULOCYTES NFR BLD AUTO: 0 % (ref 0–0.9)
LYMPHOCYTES # BLD AUTO: 1.38 X10*3/UL (ref 0.8–3)
LYMPHOCYTES NFR BLD AUTO: 20.1 %
MCH RBC QN AUTO: 25.6 PG (ref 26–34)
MCHC RBC AUTO-ENTMCNC: 31.2 G/DL (ref 32–36)
MCV RBC AUTO: 82 FL (ref 80–100)
MONOCYTES # BLD AUTO: 0.53 X10*3/UL (ref 0.05–0.8)
MONOCYTES NFR BLD AUTO: 7.7 %
NEUTROPHILS # BLD AUTO: 4.69 X10*3/UL (ref 1.6–5.5)
NEUTROPHILS NFR BLD AUTO: 68.4 %
PLATELET # BLD AUTO: 176 X10*3/UL (ref 150–450)
POTASSIUM SERPL-SCNC: 4.4 MMOL/L (ref 3.5–5.3)
PROT SERPL-MCNC: 6.4 G/DL (ref 6.4–8.2)
RBC # BLD AUTO: 4.69 X10*6/UL (ref 4.5–5.9)
SODIUM SERPL-SCNC: 134 MMOL/L (ref 136–145)
WBC # BLD AUTO: 6.9 X10*3/UL (ref 4.4–11.3)

## 2024-10-17 PROCEDURE — 36415 COLL VENOUS BLD VENIPUNCTURE: CPT | Performed by: INTERNAL MEDICINE

## 2024-10-17 PROCEDURE — 99215 OFFICE O/P EST HI 40 MIN: CPT | Performed by: INTERNAL MEDICINE

## 2024-10-17 PROCEDURE — 85025 COMPLETE CBC W/AUTO DIFF WBC: CPT | Performed by: INTERNAL MEDICINE

## 2024-10-17 PROCEDURE — 84075 ASSAY ALKALINE PHOSPHATASE: CPT | Performed by: INTERNAL MEDICINE

## 2024-10-17 PROCEDURE — 86316 IMMUNOASSAY TUMOR OTHER: CPT | Performed by: INTERNAL MEDICINE

## 2024-10-17 RX ORDER — ALBUTEROL SULFATE 0.83 MG/ML
3 SOLUTION RESPIRATORY (INHALATION) AS NEEDED
OUTPATIENT
Start: 2024-10-31

## 2024-10-17 RX ORDER — LANREOTIDE ACETATE 120 MG/.5ML
120 INJECTION SUBCUTANEOUS ONCE
OUTPATIENT
Start: 2024-10-31

## 2024-10-17 RX ORDER — FAMOTIDINE 10 MG/ML
20 INJECTION INTRAVENOUS ONCE AS NEEDED
OUTPATIENT
Start: 2024-10-31

## 2024-10-17 RX ORDER — EPINEPHRINE 0.3 MG/.3ML
0.3 INJECTION SUBCUTANEOUS EVERY 5 MIN PRN
OUTPATIENT
Start: 2024-10-31

## 2024-10-17 RX ORDER — DIPHENHYDRAMINE HYDROCHLORIDE 50 MG/ML
50 INJECTION INTRAMUSCULAR; INTRAVENOUS AS NEEDED
OUTPATIENT
Start: 2024-10-31

## 2024-10-17 ASSESSMENT — PAIN SCALES - GENERAL: PAINLEVEL_OUTOF10: 0-NO PAIN

## 2024-10-17 NOTE — TELEPHONE ENCOUNTER
----- Message from Roseanna Durbin sent at 10/17/2024  3:37 PM EDT -----  Cc'd results from hematology, glucose this morning 484 bicarb WNL  Following with endo for primary for DM and onc for neuroendocrine tumor   Has he taken medications this morning? Symptoms of fruity smelling breath, confusion, nausea, emesis? If these are present recommend the ED.

## 2024-10-17 NOTE — PATIENT INSTRUCTIONS
Follow up visit with Dr. Turner for neuroendocrine tumor.     Start lanreotide injection in about 2 weeks.     Follow up with Dr. Turner in 6 weeks.

## 2024-10-17 NOTE — PROGRESS NOTES
Patient ID: Juan Varela is a 74 y.o. male.  Referring Physician: Juan David Turner MD  [address] [facility], [address]  [place] [phone]  Primary Care Provider: Roseanna Durbin DO  Oncology history    #1 metastatic neuroendocrine tumor  Presented with syncope, CAT scan of chest abdomen pelvis did show rectal wall thickening and ill-defined liver lesions  8/20/24 , colonoscopy normal no any abnormal pathology  7/26/24 , MRI of liver,Multiple (approximately 6) bilobar enhancing liver lesions, concerning for metastases.  9/9/24  , liver biopsy, well-differentiated neuroendocrine tumor involving liver, WHO grade 2  Immunostains show that the tumor cells are positive for CKAE1/3, synaptophysin, chromogranin and INSM1, while negative for GATA3, NKX3.1, TTF-1, CK7, and CK20. The immunoprofile is consistent with a neuroendocrine neoplasm.   The ki-67 index is 3.6%   10/03/24 , NETSPOT 1.  Multiple Dotatate avid hypodense hepatic masses compatible with metastatic neuroendocrine malignancy. 2. An enlarged Dotatate avid small-bowel mesenteric lymph node iscompatible with katerina malignancy of neuroendocrine origin.  3. No definite Dotatate avid lesion within the small bowel, pancreas, or elsewhere within the abdomen and pelvis to suggest the site of primary malignancy.    Subjective    HPI   Juan Varela is a 74 y.o. male with PMH of afib, DM2, HTN, afib, and hypothyroidism who was   seen in ER 7/7/24 for near syncope. While in ER, he reported abdominal pain. CT A/P noted diffuse rectal wall thickening and multifocal ill-defined liver lesions c/f metastasis. He was discharged with follow up recommendations to oncology and GI.    GI evaluation done, 8/20/24 , colonoscopy normal no any abnormal pathology  7/26/24 , MRI of liver,Multiple (approximately 6) bilobar enhancing liver lesions,  concerning for metastases.    Patient is doing very well patient denies any headache no dizziness no nausea vomiting,  no chest pain, no shortness of breath, nonspecific abdominal pain which happened couple of time, no diarrhea and constipation no rectal bleed something patient loose bowel movement, no dysuria and hematuria, history of weight loss now body weight is stable     He was hospitalized in June 2024 for afib, hypoglycemia, and near syncope. He is currently on sotolol. His most recent EKG 7/7/24 showed NSR. Qtc has returned to normal.       Interval history  10/17/24  CAT scan of chest abdominal pelvis did show multiple hepatic lesion which were confirmed by MRI.  Liver biopsy done pathology positive for well-differentiated neuroendocrine tumor.     No chest pain no shortness of breath, no skin rash, no diarrhea, no hot flashes patient is asymptomatic.  NETSPOT, positive for hepatic lesions and small mesenteric lymphadenopathy, no abnormal activity seen in the small intestine and colon, lungs, rectum.    Discussed with the patient  Review of Systems:  Review of Systems   Constitutional:  Negative for chills and fever.   HENT:  Negative for sore throat and trouble swallowing.    Respiratory:  Negative for cough and shortness of breath.    Cardiovascular:  Negative for chest pain and palpitations.   Gastrointestinal:       Endocrine: Negative for cold intolerance and heat intolerance.   Genitourinary:  Negative for difficulty urinating.   Musculoskeletal:  Negative for arthralgias and joint swelling.   Skin:  Negative for rash and wound.   Neurological:  Negative for dizziness and weakness.   Hematological:  Negative for adenopathy. Does not bruise/bleed easily.   Psychiatric/Behavioral:  Negative for confusion.          Medications:          Prior to Admission medications    Medication Sig Start Date End Date Taking? Authorizing Provider   atorvastatin (Lipitor) 40 mg tablet TAKE 1 TABLET BY MOUTH EVERYDAY AT BEDTIME 3/8/24     Genesis Mancuso MD   empagliflozin (Jardiance) 25 mg Take 1 tablet (25 mg) by mouth  once daily. 6/4/24 12/1/24   Genesis Mancuso MD   insulin glargine (Lantus) 100 unit/mL injection Inject 40 Units under the skin once daily in the morning. Take before meals. Take as directed per insulin instructions. Do not fill before June 29, 2024. 6/29/24     Rd Esquivel,    levothyroxine (Synthroid, Levoxyl) 150 mcg tablet TAKE 1 TABLET BY MOUTH EVERY DAY AS DIRECTED 5/24/24     Genesis Mancuso MD   lisinopril 2.5 mg tablet TAKE 1 TABLET BY MOUTH ONCE DAILY. 4/26/24     Genesis Mancuso MD   pioglitazone (Actos) 45 mg tablet TAKE 1 TABLET (45 MG) BY MOUTH ONCE DAILY 5/24/24 11/20/24   Genesis Mancuso MD   sotalol (Betapace) 120 mg tablet TAKE 1/2 TABLET (60 MG) BY MOUTH 2 TIMES A DAY. 3/6/24     Chet Ley PA-C         Allergies:  Patient has no known allergies.     Past Medical History:  He has a past medical history of Atrial fibrillation (Multi), Diabetes mellitus (Multi), and Hypertension.     Past Surgical History:  He has a past surgical history that includes Other surgical history (09/02/2021).     Social History:  He reports that he has never smoked. He has never been exposed to tobacco smoke. He has never used smokeless tobacco. He reports that he does not drink alcohol and does not use drugs.        Review of Systems - Oncology   Review of Systems:  Review of Systems   Constitutional:  Negative for chills and fever.   HENT:  Negative for sore throat and trouble swallowing.    Respiratory:  Negative for cough and shortness of breath.    Cardiovascular:  Negative for chest pain and palpitations.   Gastrointestinal:       Endocrine: Negative for cold intolerance and heat intolerance.   Genitourinary:  Negative for difficulty urinating.   Musculoskeletal:  Negative for arthralgias and joint swelling.   Skin:  Negative for rash and wound.   Neurological:  Negative for dizziness and weakness.   Hematological:  Negative for adenopathy. Does not  "bruise/bleed easily.   Psychiatric/Behavioral:  Negative for confusion.      Medications:          Prior to Admission medications    Medication Sig Start Date End Date Taking? Authorizing Provider   atorvastatin (Lipitor) 40 mg tablet TAKE 1 TABLET BY MOUTH EVERYDAY AT BEDTIME 3/8/24     Genesis Mancuso MD   empagliflozin (Jardiance) 25 mg Take 1 tablet (25 mg) by mouth once daily. 6/4/24 12/1/24   Genesis Mancuso MD   insulin glargine (Lantus) 100 unit/mL injection Inject 40 Units under the skin once daily in the morning. Take before meals. Take as directed per insulin instructions. Do not fill before June 29, 2024. 6/29/24     Rd Esquivel,    levothyroxine (Synthroid, Levoxyl) 150 mcg tablet TAKE 1 TABLET BY MOUTH EVERY DAY AS DIRECTED 5/24/24     Genesis Mancuso MD   lisinopril 2.5 mg tablet TAKE 1 TABLET BY MOUTH ONCE DAILY. 4/26/24     Genesis Mancuso MD   pioglitazone (Actos) 45 mg tablet TAKE 1 TABLET (45 MG) BY MOUTH ONCE DAILY 5/24/24 11/20/24   Genesis Mancuso MD   sotalol (Betapace) 120 mg tablet TAKE 1/2 TABLET (60 MG) BY MOUTH 2 TIMES A DAY. 3/6/24     Chet Ley PA-C      Allergies:  Patient has no known allergies.      Family History   Family history unknown: Yes     Juan Varela  reports that he has never smoked. He has never been exposed to tobacco smoke. He has never used smokeless tobacco.  He  reports no history of alcohol use.  He  reports no history of drug use.    Objective   BSA: 2.32 meters squared  /77 (BP Location: Left arm, Patient Position: Sitting)   Pulse 79   Temp 36.7 °C (98.1 °F) (Temporal)   Resp 16   Ht (S) 1.887 m (6' 2.29\")   Wt 103 kg (227 lb 15.3 oz)   SpO2 98%   BMI 29.04 kg/m²     Physical Exam  Vitals are stable    HEENT examination normal limit    Neck is supple, no carotid bruit, no thyromegaly no cervical lymphadenopathy    Lungs clear on the both side no wheezing    Heart with normal " regular rhythm    Abdomen is soft, nontender no hepatosplenomegaly, no any other masses noted, normotonic bowel sound    Extremity patient with trace edema left lower extremity    Neuro examination nonfocal    Lymphatic no peripheral lymphadenopathy    Skin examination did show dry skin no rash no pigmented lesion    Labs  1 mo ago  (7/7/24) 1 mo ago  (6/28/24) 1 mo ago  (6/27/24) 2 mo ago  (5/31/24) 1 yr ago  (6/26/23) 1 yr ago  (6/26/23) 1 yr ago  (1/9/23)    Glucose  74 - 99 mg/dL 315 High  127 High  119 High  164 High  139 High  140 High  144 High    Sodium  136 - 145 mmol/L 137 140 139 141 141 140 139   Potassium  3.5 - 5.3 mmol/L 4.7 3.9 4.3 CM 4.0 4.0 4.0 4.1   Chloride  98 - 107 mmol/L 104 106 107 105 107 107 105   Bicarbonate  21 - 32 mmol/L 24 28 27 28 27 28 28   Anion Gap  10 - 20 mmol/L 14 10 9 Low  12 11 9 Low  10   Urea Nitrogen  6 - 23 mg/dL 21 15 18 16 13 14 15   Creatinine  0.50 - 1.30 mg/dL 1.27 0.87 0.99 1.02 1.01 1.06 0.97   eGFR  >60 mL/min/1.73m*2 59 Low  89 CM 79 CM 76 CM      Comment: Calculations of estimated GFR are performed using the 2021 CKD-EPI Study Refit equation without the race variable for the IDMS-Traceable creatinine methods.  https://jasn.asnjournals.org/content/early/2021/09/22/ASN.9658383341   Calcium  8.6 - 10.3 mg/dL 9.8 8.8 8.6 8.7 8.8 8.7 9.0   Albumin  3.4 - 5.0 g/dL 4.6  4.1 4.1  4.0    Alkaline Phosphatase  33 - 136 U/L 96  70 83  87    Total Protein  6.4 - 8.2 g/dL 7.6  6.6 6.4  6.4    AST  9 - 39 U/L 18  17 CM 13  14    Bilirubin, Total  0.0 - 1.2 mg/dL 1.1  0.5 0.7  0.8    ALT  10 - 52 U/L 25             Ref Range & Units 1 mo ago  (7/7/24) 1 mo ago  (6/27/24) 2 yr ago  (8/9/22) 2 yr ago  (3/1/22)   WBC  4.4 - 11.3 x10*3/uL 13.1 High  10.2 6.3 R 7.7 R   nRBC  0.0 - 0.0 /100 WBCs 0.0 0.0     RBC  4.50 - 5.90 x10*6/uL 5.74 5.00 5.19 R 5.44 R   Hemoglobin  13.5 - 17.5 g/dL 15.8 13.6 15.1 16.1   Hematocrit  41.0 - 52.0 % 47.1 41.1 43.6 46.3   MCV  80 - 100 fL 82 82 84 85    MCH  26.0 - 34.0 pg 27.5 27.2     MCHC  32.0 - 36.0 g/dL 33.5 33.1 34.6 34.8   RDW  11.5 - 14.5 % 13.9 13.3 13.0 13.1   Platelets  150 - 450 x10*3/uL 243 188 198 R 199 R   Neutrophils %  40.0 - 80.0 % 84.5 81.4     Immature Granulocytes %, Automated  0.0 - 0.9 % 0.3 0.4 CM     Comment: Immature Granulocyte Count (IG) includes promyelocytes, myelocytes and metamyelocytes but does not include bands. Percent differential counts (%) should be interpreted in the context of the absolute cell counts (cells/UL).   Lymphocytes %  13.0 - 44.0 % 8.4 9.2     Monocytes %  2.0 - 10.0 % 5.7 6.8     Eosinophils %  0.0 - 6.0 % 0.8 1.7     Basophils %  0.0 - 2.0 % 0.3 0.5     Neutrophils Absolute  1.60 - 5.50 x10*3/uL 11.03 High       PSA 6.1  Radiology report  CT abdominal pelvis,Multifocal ill-defined lesions within the liver. Findings are  concerning for malignancy/metastasis. Dedicated contrast-enhanced  liver MRI is suggested for further evaluation.      Diffuse rectal wall thickening noted which could be related to  proctitis however correlation for neoplasia is suggested. Inspissated  stool within the rectum with mild inflammatory changes of the distal  colon noted. Correlation for proctocolitis also recommended    MRI of liver,Multiple (approximately 6) bilobar enhancing liver lesions,  concerning for metastases.  Performance Status: ECOG 0  Pathology  FINAL DIAGNOSIS   A.  Liver, mass, biopsy:  -Well-differentiated neuroendocrine tumor involving the liver, WHO grade 2 (see comment).     Comment:  Immunostains show that the tumor cells are positive for CKAE1/3, synaptophysin, chromogranin and INSM1, while negative for GATA3, NKX3.1, TTF-1, CK7, and CK20.  The immunoprofile is consistent with a neuroendocrine neoplasm. The ki-67 index is 3.6%   NETSPOT  1.  Multiple Dotatate avid hypodense hepatic masses compatible with  metastatic neuroendocrine malignancy.  2. An enlarged Dotatate avid small-bowel mesenteric lymph node  is  compatible with katerina malignancy of neuroendocrine origin.  3. No definite Dotatate avid lesion within the small bowel, pancreas,  or elsewhere within the abdomen and pelvis to suggest the site of  primary malignancy.  Assessment/Plan      ##1 metastatic neuroendocrine tumor  Presented with syncope, CAT scan of chest abdomen pelvis did show rectal wall thickening and ill-defined liver lesions  8/20/24 , colonoscopy normal no any abnormal pathology  7/26/24 , MRI of liver,Multiple (approximately 6) bilobar enhancing liver lesions, concerning for metastases.  9/9/24 , liver biopsy, well-differentiated neuroendocrine tumor involving liver, WHO grade 2  Immunostains show that the tumor cells are positive for CKAE1/3, synaptophysin, chromogranin and INSM1, while negative for GATA3, NKX3.1, TTF-1, CK7, and CK20. The immunoprofile is consistent with a neuroendocrine neoplasm.   The ki-67 index is 3.6%   Thanks part positive in liver and mesenteric lymph node    Discussed with the patient     I will see start lanreotide every 28 hour, side effect discussed with patient    Second opinion from neuroendocrine service, questionable redo ablative therapy for hepatic lesion      time spent 40 minutes  Juan David Turner MD

## 2024-10-18 NOTE — TELEPHONE ENCOUNTER
That would be frustrating. Has he checked his numbers since? I just want to make sure they are coming down appropriately. It sounds like there may need to be adjustments to his insulin regimen.

## 2024-10-18 NOTE — TELEPHONE ENCOUNTER
He did take all of his meds, and has none of the above symptoms. He said he is surprised if the endo even paid attention to him when he talked.  Patient said he is not happy with [facility] and the process and why the key doctors don't discuss things. He wants his doctors to be a team working together and said he will be looking elsewhere for care since no one contacted him or told him while he was there but passed the result to you.

## 2024-10-21 NOTE — TELEPHONE ENCOUNTER
His son answered and said that he tried to get the phone from his dad when I talked to him the other day to let us know that he had pie and ice cream before his appt and that his numbers are fine now.

## 2024-10-23 DIAGNOSIS — R80.9 MICROALBUMINURIA: ICD-10-CM

## 2024-10-23 RX ORDER — LISINOPRIL 2.5 MG/1
2.5 TABLET ORAL DAILY
Qty: 90 TABLET | Refills: 1 | Status: SHIPPED | OUTPATIENT
Start: 2024-10-23

## 2024-10-25 DIAGNOSIS — D3A.8 NEUROENDOCRINE TUMOR: ICD-10-CM

## 2024-10-28 LAB — CHROMOGRANIN A, LC/MS/MS: 843 NG/ML

## 2024-10-31 ENCOUNTER — INFUSION (OUTPATIENT)
Dept: HEMATOLOGY/ONCOLOGY | Facility: CLINIC | Age: 74
End: 2024-10-31
Payer: MEDICARE

## 2024-10-31 VITALS
OXYGEN SATURATION: 100 % | SYSTOLIC BLOOD PRESSURE: 144 MMHG | WEIGHT: 224 LBS | BODY MASS INDEX: 28.75 KG/M2 | DIASTOLIC BLOOD PRESSURE: 74 MMHG | TEMPERATURE: 97.3 F | RESPIRATION RATE: 16 BRPM | HEART RATE: 66 BPM | HEIGHT: 74 IN

## 2024-10-31 DIAGNOSIS — D3A.8 NEUROENDOCRINE TUMOR: ICD-10-CM

## 2024-10-31 PROCEDURE — 96372 THER/PROPH/DIAG INJ SC/IM: CPT

## 2024-10-31 PROCEDURE — 2500000004 HC RX 250 GENERAL PHARMACY W/ HCPCS (ALT 636 FOR OP/ED): Mod: JZ,JG | Performed by: INTERNAL MEDICINE

## 2024-10-31 RX ORDER — LANREOTIDE ACETATE 120 MG/.5ML
120 INJECTION SUBCUTANEOUS ONCE
Status: COMPLETED | OUTPATIENT
Start: 2024-10-31 | End: 2024-10-31

## 2024-10-31 RX ORDER — DIPHENHYDRAMINE HYDROCHLORIDE 50 MG/ML
50 INJECTION INTRAMUSCULAR; INTRAVENOUS AS NEEDED
OUTPATIENT
Start: 2024-11-28

## 2024-10-31 RX ORDER — EPINEPHRINE 0.3 MG/.3ML
0.3 INJECTION SUBCUTANEOUS EVERY 5 MIN PRN
OUTPATIENT
Start: 2024-11-28

## 2024-10-31 RX ORDER — LANREOTIDE ACETATE 120 MG/.5ML
120 INJECTION SUBCUTANEOUS ONCE
OUTPATIENT
Start: 2024-11-28

## 2024-10-31 RX ORDER — ALBUTEROL SULFATE 0.83 MG/ML
3 SOLUTION RESPIRATORY (INHALATION) AS NEEDED
OUTPATIENT
Start: 2024-11-28

## 2024-10-31 RX ORDER — FAMOTIDINE 10 MG/ML
20 INJECTION INTRAVENOUS ONCE AS NEEDED
OUTPATIENT
Start: 2024-11-28

## 2024-10-31 ASSESSMENT — PAIN SCALES - GENERAL: PAINLEVEL_OUTOF10: 0-NO PAIN

## 2024-11-22 DIAGNOSIS — E11.9 CONTROLLED TYPE 2 DIABETES MELLITUS WITHOUT COMPLICATION, WITHOUT LONG-TERM CURRENT USE OF INSULIN (MULTI): Chronic | ICD-10-CM

## 2024-11-22 DIAGNOSIS — E03.9 HYPOTHYROIDISM, UNSPECIFIED: ICD-10-CM

## 2024-11-22 RX ORDER — LEVOTHYROXINE SODIUM 150 UG/1
150 TABLET ORAL DAILY
Qty: 90 TABLET | Refills: 1 | Status: SHIPPED | OUTPATIENT
Start: 2024-11-22

## 2024-11-22 RX ORDER — PIOGLITAZONEHYDROCHLORIDE 45 MG/1
45 TABLET ORAL DAILY
Qty: 90 TABLET | Refills: 1 | Status: SHIPPED | OUTPATIENT
Start: 2024-11-22 | End: 2025-05-21

## 2024-11-27 ENCOUNTER — INFUSION (OUTPATIENT)
Dept: HEMATOLOGY/ONCOLOGY | Facility: CLINIC | Age: 74
End: 2024-11-27
Payer: MEDICARE

## 2024-11-27 VITALS
BODY MASS INDEX: 28.75 KG/M2 | OXYGEN SATURATION: 100 % | HEIGHT: 74 IN | WEIGHT: 224 LBS | DIASTOLIC BLOOD PRESSURE: 65 MMHG | SYSTOLIC BLOOD PRESSURE: 116 MMHG | RESPIRATION RATE: 16 BRPM | HEART RATE: 73 BPM | TEMPERATURE: 97.3 F

## 2024-11-27 DIAGNOSIS — D3A.8 NEUROENDOCRINE TUMOR: ICD-10-CM

## 2024-11-27 PROCEDURE — 2500000004 HC RX 250 GENERAL PHARMACY W/ HCPCS (ALT 636 FOR OP/ED): Mod: JZ,JG | Performed by: INTERNAL MEDICINE

## 2024-11-27 PROCEDURE — 96372 THER/PROPH/DIAG INJ SC/IM: CPT

## 2024-11-27 RX ORDER — EPINEPHRINE 0.3 MG/.3ML
0.3 INJECTION SUBCUTANEOUS EVERY 5 MIN PRN
OUTPATIENT
Start: 2024-11-28

## 2024-11-27 RX ORDER — LANREOTIDE ACETATE 120 MG/.5ML
120 INJECTION SUBCUTANEOUS ONCE
Status: COMPLETED | OUTPATIENT
Start: 2024-11-27 | End: 2024-11-27

## 2024-11-27 RX ORDER — FAMOTIDINE 10 MG/ML
20 INJECTION INTRAVENOUS ONCE AS NEEDED
OUTPATIENT
Start: 2024-11-28

## 2024-11-27 RX ORDER — LANREOTIDE ACETATE 120 MG/.5ML
120 INJECTION SUBCUTANEOUS ONCE
OUTPATIENT
Start: 2024-11-28

## 2024-11-27 RX ORDER — DIPHENHYDRAMINE HYDROCHLORIDE 50 MG/ML
50 INJECTION INTRAMUSCULAR; INTRAVENOUS AS NEEDED
OUTPATIENT
Start: 2024-11-28

## 2024-11-27 RX ORDER — ALBUTEROL SULFATE 0.83 MG/ML
3 SOLUTION RESPIRATORY (INHALATION) AS NEEDED
OUTPATIENT
Start: 2024-11-28

## 2024-11-27 ASSESSMENT — PAIN SCALES - GENERAL: PAINLEVEL_OUTOF10: 0-NO PAIN

## 2024-11-27 NOTE — PROGRESS NOTES
Patient identified by name & .   Patient denies acute distress- none noted at this time. Site care given with band aid. Patient discharged home in stable condition, ambulatory.

## 2024-12-04 NOTE — PATIENT INSTRUCTIONS
Thank you for choosing [facility]  for your health care needs.  If you have any questions, concerns or medical needs, please feel free to contact our office at [phone].    Please ensure you complete your blood work one week before the next scheduled appointment.    To continue Jardiance 25mg once daily   To continue pioglitazone 45mg once daily   To continue Lantus 40 units SQ bedtime   May need to bring back Glipizide given hyperglycemia   To continue Levothyroxine 150mcg po daily   Take levothyroxine on an empty stomach with water alone, 30-60 minutes before eating or taking other medications, 4 hours before any calcium or iron supplement  To obtain blood tests which were ordered in June   For follow up in 6 months

## 2024-12-04 NOTE — PROGRESS NOTES
"Subjective   Juan Varela is a 74 y.o. male who presents for follow-up of Type 2 diabetes mellitus.     His dizziness has subsided.  He states that his Metformin was discontinued and thus does not know why he was on all of these pills.  Glipizide was discontinued when he was hospitalized for hypoglycemia.       He was found to have metastatic neuroendocrine tumor.  He has completed four  out of six more shots.    Known complications due to diabetes included none.      Cardiovascular risk factors include advanced age (older than 55 for men, 65 for women), diabetes mellitus, male gender, and microalbuminuria. The patient is on an ACE inhibitor or angiotensin II receptor blocker.  The patient has not been previously hospitalized due to diabetic ketoacidosis.     Current symptoms/problems include none. His clinical course has been stable.     The patient is not currently checking the blood glucose.      Hypoglycemia frequency: Denies  Symptoms of hypoglycemia: N/A      Current Medication Regimen  Lantus 40 units SQ bedtime   Pioglitazone 45mg once daily   Jardiance 25mg once daily      MEALS:  increased carbs and calories      Denies exercise regimen     Review of Systems   Constitutional:  Positive for unexpected weight change (weight loss).   HENT:  Positive for dental problem.    Musculoskeletal:  Positive for arthralgias, myalgias and neck pain.       Objective   /78 (BP Location: Left arm, Patient Position: Sitting, BP Cuff Size: Adult)   Pulse 71   Ht 1.905 m (6' 3\")   Wt 99.8 kg (220 lb)   BMI 27.50 kg/m²   Physical Exam  Vitals and nursing note reviewed.   Constitutional:       General: He is not in acute distress.     Appearance: Normal appearance. He is normal weight.   HENT:      Head: Normocephalic and atraumatic.      Nose: Nose normal.      Mouth/Throat:      Mouth: Mucous membranes are moist.   Eyes:      Extraocular Movements: Extraocular movements intact.   Cardiovascular:      Rate and " Rhythm: Normal rate and regular rhythm.   Pulmonary:      Effort: Pulmonary effort is normal.      Breath sounds: Normal breath sounds.   Musculoskeletal:         General: Normal range of motion.   Skin:     General: Skin is warm.   Neurological:      Mental Status: He is alert and oriented to person, place, and time.   Psychiatric:         Mood and Affect: Mood normal.         Lab Review  Glucose (mg/dL)   Date Value   10/17/2024 484 (HH)   07/07/2024 315 (H)   06/28/2024 127 (H)     POC HEMOGLOBIN A1c (%)   Date Value   12/04/2023 8.2 (A)     Hemoglobin A1C (%)   Date Value   05/31/2024 7.9 (H)   06/26/2023 8.3 (A)   06/26/2023 8.2 (A)   08/09/2022 8.2 (A)     Bicarbonate (mmol/L)   Date Value   10/17/2024 26   07/07/2024 24   06/28/2024 28     Urea Nitrogen (mg/dL)   Date Value   10/17/2024 20   07/07/2024 21   06/28/2024 15     Creatinine (mg/dL)   Date Value   10/17/2024 1.17   07/07/2024 1.27   06/28/2024 0.87     Lab Results   Component Value Date    CHOL 111 05/31/2024    CHOL 116 06/26/2023    CHOL 115 06/26/2023     Lab Results   Component Value Date    HDL 36.1 05/31/2024    HDL 32.8 (A) 06/26/2023    HDL 32.3 (A) 06/26/2023     Lab Results   Component Value Date    LDLCALC 48 05/31/2024     Lab Results   Component Value Date    TRIG 137 05/31/2024    TRIG 203 (H) 06/26/2023    TRIG 204 (H) 06/26/2023     Lab Results   Component Value Date    TSH 5.89 (H) 07/07/2024    K0KHERG 8.6 05/31/2024    THYROIDPAB >1000 (A) 01/09/2023       Health Maintenance:   Foot Exam:  June 6, 2023  Eye Exam:  2022  Urine Albumin: June 26, 2023    Assessment/Plan     74-year-old male presents for follow up for type 2 diabetes mellitus. His blood pressure is at goal. He is noted to be on a statin.     He has Hashimoto's thyroiditis.      Type 2 diabetes mellitus (Multi)  To continue Jardiance 25mg once daily   To continue Pioglitazone 45mg once daily   To continue Lantus 40 units SQ bedtime   May need to bring back Glipizide  given hyperglycemia   To obtain blood tests which were ordered in June       Long-term insulin use (Multi)  Please rotate insulin injection sites      Hypothyroidism  To continue Levothyroxine 150mcg po daily   Take levothyroxine on an empty stomach with water alone, 30-60 minutes before eating or taking other medications, 4 hours before any calcium or iron supplement  To obtain TFTs    For follow up in 6 months

## 2024-12-05 ENCOUNTER — APPOINTMENT (OUTPATIENT)
Dept: ENDOCRINOLOGY | Facility: CLINIC | Age: 74
End: 2024-12-05
Payer: MEDICARE

## 2024-12-05 VITALS
SYSTOLIC BLOOD PRESSURE: 120 MMHG | HEIGHT: 75 IN | WEIGHT: 220 LBS | DIASTOLIC BLOOD PRESSURE: 78 MMHG | BODY MASS INDEX: 27.35 KG/M2 | HEART RATE: 71 BPM

## 2024-12-05 DIAGNOSIS — Z79.4 LONG-TERM INSULIN USE (MULTI): ICD-10-CM

## 2024-12-05 DIAGNOSIS — E11.9 TYPE 2 DIABETES MELLITUS WITHOUT COMPLICATION, UNSPECIFIED WHETHER LONG TERM INSULIN USE (MULTI): ICD-10-CM

## 2024-12-05 DIAGNOSIS — E11.9 TYPE 2 DIABETES MELLITUS WITHOUT COMPLICATION, WITH LONG-TERM CURRENT USE OF INSULIN (MULTI): Primary | ICD-10-CM

## 2024-12-05 DIAGNOSIS — E03.9 ACQUIRED HYPOTHYROIDISM: ICD-10-CM

## 2024-12-05 DIAGNOSIS — Z79.4 TYPE 2 DIABETES MELLITUS WITHOUT COMPLICATION, WITH LONG-TERM CURRENT USE OF INSULIN (MULTI): Primary | ICD-10-CM

## 2024-12-05 LAB — POC FINGERSTICK BLOOD GLUCOSE: 288 MG/DL (ref 70–100)

## 2024-12-05 PROCEDURE — G2211 COMPLEX E/M VISIT ADD ON: HCPCS | Performed by: INTERNAL MEDICINE

## 2024-12-05 PROCEDURE — 1159F MED LIST DOCD IN RCRD: CPT | Performed by: INTERNAL MEDICINE

## 2024-12-05 PROCEDURE — 1123F ACP DISCUSS/DSCN MKR DOCD: CPT | Performed by: INTERNAL MEDICINE

## 2024-12-05 PROCEDURE — 1160F RVW MEDS BY RX/DR IN RCRD: CPT | Performed by: INTERNAL MEDICINE

## 2024-12-05 PROCEDURE — 82962 GLUCOSE BLOOD TEST: CPT | Performed by: INTERNAL MEDICINE

## 2024-12-05 PROCEDURE — 99214 OFFICE O/P EST MOD 30 MIN: CPT | Performed by: INTERNAL MEDICINE

## 2024-12-05 ASSESSMENT — ENCOUNTER SYMPTOMS
MYALGIAS: 1
UNEXPECTED WEIGHT CHANGE: 1
ARTHRALGIAS: 1
NECK PAIN: 1

## 2024-12-06 DIAGNOSIS — E78.5 HYPERLIPIDEMIA, UNSPECIFIED: ICD-10-CM

## 2024-12-06 RX ORDER — ATORVASTATIN CALCIUM 40 MG/1
40 TABLET, FILM COATED ORAL NIGHTLY
Qty: 90 TABLET | Refills: 1 | Status: SHIPPED | OUTPATIENT
Start: 2024-12-06

## 2024-12-09 NOTE — ASSESSMENT & PLAN NOTE
To continue Levothyroxine 150mcg po daily   Take levothyroxine on an empty stomach with water alone, 30-60 minutes before eating or taking other medications, 4 hours before any calcium or iron supplement  To obtain TFTs    For follow up in 6 months

## 2024-12-09 NOTE — ASSESSMENT & PLAN NOTE
To continue Jardiance 25mg once daily   To continue Pioglitazone 45mg once daily   To continue Lantus 40 units SQ bedtime   May need to bring back Glipizide given hyperglycemia   To obtain blood tests which were ordered in June

## 2024-12-14 DIAGNOSIS — Z79.4 TYPE 2 DIABETES MELLITUS WITH HYPOGLYCEMIA WITHOUT COMA, WITH LONG-TERM CURRENT USE OF INSULIN: Chronic | ICD-10-CM

## 2024-12-14 DIAGNOSIS — E11.649 TYPE 2 DIABETES MELLITUS WITH HYPOGLYCEMIA WITHOUT COMA, WITH LONG-TERM CURRENT USE OF INSULIN: Chronic | ICD-10-CM

## 2024-12-16 RX ORDER — INSULIN GLARGINE 100 [IU]/ML
40 INJECTION, SOLUTION SUBCUTANEOUS NIGHTLY
Qty: 20 ML | Refills: 5 | Status: SHIPPED | OUTPATIENT
Start: 2024-12-16 | End: 2025-06-14

## 2024-12-22 ENCOUNTER — HOSPITAL ENCOUNTER (EMERGENCY)
Facility: HOSPITAL | Age: 74
Discharge: HOME | End: 2024-12-22
Attending: STUDENT IN AN ORGANIZED HEALTH CARE EDUCATION/TRAINING PROGRAM
Payer: MEDICARE

## 2024-12-22 ENCOUNTER — APPOINTMENT (OUTPATIENT)
Dept: RADIOLOGY | Facility: HOSPITAL | Age: 74
End: 2024-12-22
Payer: MEDICARE

## 2024-12-22 VITALS
RESPIRATION RATE: 10 BRPM | SYSTOLIC BLOOD PRESSURE: 138 MMHG | DIASTOLIC BLOOD PRESSURE: 74 MMHG | TEMPERATURE: 96 F | BODY MASS INDEX: 27.35 KG/M2 | HEART RATE: 68 BPM | OXYGEN SATURATION: 100 % | HEIGHT: 75 IN | WEIGHT: 220 LBS

## 2024-12-22 DIAGNOSIS — R42 LIGHTHEADEDNESS: Primary | ICD-10-CM

## 2024-12-22 LAB
ANION GAP SERPL CALC-SCNC: 10 MMOL/L (ref 10–20)
APPEARANCE UR: CLEAR
BASOPHILS # BLD AUTO: 0.07 X10*3/UL (ref 0–0.1)
BASOPHILS NFR BLD AUTO: 0.8 %
BILIRUB UR STRIP.AUTO-MCNC: NEGATIVE MG/DL
BUN SERPL-MCNC: 20 MG/DL (ref 6–23)
CALCIUM SERPL-MCNC: 8.5 MG/DL (ref 8.6–10.3)
CARDIAC TROPONIN I PNL SERPL HS: 10 NG/L (ref 0–20)
CARDIAC TROPONIN I PNL SERPL HS: 9 NG/L (ref 0–20)
CHLORIDE SERPL-SCNC: 105 MMOL/L (ref 98–107)
CO2 SERPL-SCNC: 23 MMOL/L (ref 21–32)
COLOR UR: ABNORMAL
CREAT SERPL-MCNC: 1.34 MG/DL (ref 0.5–1.3)
EGFRCR SERPLBLD CKD-EPI 2021: 55 ML/MIN/1.73M*2
EOSINOPHIL # BLD AUTO: 0.23 X10*3/UL (ref 0–0.4)
EOSINOPHIL NFR BLD AUTO: 2.5 %
ERYTHROCYTE [DISTWIDTH] IN BLOOD BY AUTOMATED COUNT: 14.6 % (ref 11.5–14.5)
GLUCOSE SERPL-MCNC: 290 MG/DL (ref 74–99)
GLUCOSE UR STRIP.AUTO-MCNC: ABNORMAL MG/DL
HCT VFR BLD AUTO: 36 % (ref 41–52)
HGB BLD-MCNC: 11.9 G/DL (ref 13.5–17.5)
IMM GRANULOCYTES # BLD AUTO: 0.02 X10*3/UL (ref 0–0.5)
IMM GRANULOCYTES NFR BLD AUTO: 0.2 % (ref 0–0.9)
KETONES UR STRIP.AUTO-MCNC: NEGATIVE MG/DL
LEUKOCYTE ESTERASE UR QL STRIP.AUTO: NEGATIVE
LYMPHOCYTES # BLD AUTO: 1.58 X10*3/UL (ref 0.8–3)
LYMPHOCYTES NFR BLD AUTO: 17 %
MCH RBC QN AUTO: 25.6 PG (ref 26–34)
MCHC RBC AUTO-ENTMCNC: 33.1 G/DL (ref 32–36)
MCV RBC AUTO: 77 FL (ref 80–100)
MONOCYTES # BLD AUTO: 0.79 X10*3/UL (ref 0.05–0.8)
MONOCYTES NFR BLD AUTO: 8.5 %
NEUTROPHILS # BLD AUTO: 6.6 X10*3/UL (ref 1.6–5.5)
NEUTROPHILS NFR BLD AUTO: 71 %
NITRITE UR QL STRIP.AUTO: NEGATIVE
NRBC BLD-RTO: 0 /100 WBCS (ref 0–0)
PH UR STRIP.AUTO: 5.5 [PH]
PLATELET # BLD AUTO: 191 X10*3/UL (ref 150–450)
POTASSIUM SERPL-SCNC: 4.1 MMOL/L (ref 3.5–5.3)
PROT UR STRIP.AUTO-MCNC: NEGATIVE MG/DL
RBC # BLD AUTO: 4.65 X10*6/UL (ref 4.5–5.9)
RBC # UR STRIP.AUTO: NEGATIVE /UL
SODIUM SERPL-SCNC: 134 MMOL/L (ref 136–145)
SP GR UR STRIP.AUTO: 1.03
UROBILINOGEN UR STRIP.AUTO-MCNC: NORMAL MG/DL
WBC # BLD AUTO: 9.3 X10*3/UL (ref 4.4–11.3)

## 2024-12-22 PROCEDURE — 80048 BASIC METABOLIC PNL TOTAL CA: CPT | Performed by: STUDENT IN AN ORGANIZED HEALTH CARE EDUCATION/TRAINING PROGRAM

## 2024-12-22 PROCEDURE — 85025 COMPLETE CBC W/AUTO DIFF WBC: CPT | Performed by: STUDENT IN AN ORGANIZED HEALTH CARE EDUCATION/TRAINING PROGRAM

## 2024-12-22 PROCEDURE — 70450 CT HEAD/BRAIN W/O DYE: CPT

## 2024-12-22 PROCEDURE — 81003 URINALYSIS AUTO W/O SCOPE: CPT | Performed by: STUDENT IN AN ORGANIZED HEALTH CARE EDUCATION/TRAINING PROGRAM

## 2024-12-22 PROCEDURE — 84484 ASSAY OF TROPONIN QUANT: CPT | Performed by: STUDENT IN AN ORGANIZED HEALTH CARE EDUCATION/TRAINING PROGRAM

## 2024-12-22 PROCEDURE — 70450 CT HEAD/BRAIN W/O DYE: CPT | Performed by: RADIOLOGY

## 2024-12-22 PROCEDURE — 99284 EMERGENCY DEPT VISIT MOD MDM: CPT | Mod: 25 | Performed by: STUDENT IN AN ORGANIZED HEALTH CARE EDUCATION/TRAINING PROGRAM

## 2024-12-22 PROCEDURE — 36415 COLL VENOUS BLD VENIPUNCTURE: CPT | Performed by: STUDENT IN AN ORGANIZED HEALTH CARE EDUCATION/TRAINING PROGRAM

## 2024-12-22 ASSESSMENT — PAIN SCALES - GENERAL: PAINLEVEL_OUTOF10: 0 - NO PAIN

## 2024-12-22 ASSESSMENT — PAIN - FUNCTIONAL ASSESSMENT: PAIN_FUNCTIONAL_ASSESSMENT: 0-10

## 2024-12-22 NOTE — ED TRIAGE NOTES
Pt brought in via squad for syncope and low BP. Squad was called by family for AMS. Per squad pt was pale and diaphoretic on scene. First pressure was measured at 72/40 but the second was 148/80. Squad was unsure if the first was accurate. Pt upon arrival is A&Ox4 and in no pain. Pt has normal color and is not diaphoretic. Pt says he has now has 3 vagal episodes within the past few months and last time they just discharged with with some med changes. Pt has no other complaints or concerns.

## 2024-12-22 NOTE — DISCHARGE INSTRUCTIONS
Please follow-up with your cardiologist and primary care doctor.  Come back to the ED for any new or worsening symptoms.

## 2024-12-22 NOTE — ED PROVIDER NOTES
HPI   Chief Complaint   Patient presents with    Syncope       74-year-old male with past medical history of A-fib, diabetes, hypertension presents to ED after an episode of lightheadedness.  Patient that he was cooking breakfast when eloquently lightheaded and felt like he was in a pass out.  He said he sat down.  Patient's family concerned he might have been confused.  Here upon arrival he feels better.  Denies any chest pain or shortness of breath.  No vomiting diarrhea or abdominal pain.  No dysuria, here to urinary frequency.  No leg swelling.              Patient History   Past Medical History:   Diagnosis Date    Atrial fibrillation (Multi)     Diabetes mellitus (Multi)     Hypertension      Past Surgical History:   Procedure Laterality Date    OTHER SURGICAL HISTORY  09/02/2021    Cholecystectomy     Family History   Family history unknown: Yes     Social History     Tobacco Use    Smoking status: Never     Passive exposure: Never    Smokeless tobacco: Never   Vaping Use    Vaping status: Never Used   Substance Use Topics    Alcohol use: Never    Drug use: Never       Physical Exam   ED Triage Vitals [12/22/24 1150]   Temperature Heart Rate Respirations BP   35.6 °C (96 °F) 71 18 148/88      Pulse Ox Temp src Heart Rate Source Patient Position   98 % -- -- --      BP Location FiO2 (%)     -- --       Physical Exam  Vitals and nursing note reviewed.   Constitutional:       General: He is not in acute distress.     Appearance: He is well-developed.   HENT:      Head: Normocephalic and atraumatic.   Eyes:      Conjunctiva/sclera: Conjunctivae normal.   Cardiovascular:      Rate and Rhythm: Normal rate and regular rhythm.      Heart sounds: No murmur heard.  Pulmonary:      Effort: Pulmonary effort is normal. No respiratory distress.      Breath sounds: Normal breath sounds.   Abdominal:      Palpations: Abdomen is soft.      Tenderness: There is no abdominal tenderness.   Musculoskeletal:         General: No  swelling.      Cervical back: Neck supple.   Skin:     General: Skin is warm and dry.      Capillary Refill: Capillary refill takes less than 2 seconds.   Neurological:      General: No focal deficit present.      Mental Status: He is alert and oriented to person, place, and time.   Psychiatric:         Mood and Affect: Mood normal.           ED Course & MDM   ED Course as of 12/22/24 1448   Sun Dec 22, 2024   1158 EKG shows sinus rhythm.  No STEMI.  Normal intervals.  Left axis deviation. [RS]      ED Course User Index  [RS] Jj Walls DO         Diagnoses as of 12/22/24 1448   Lightheadedness                 No data recorded     [place] Coma Scale Score: 15 (12/22/24 1155 : Jeni Hernández RN)                           Medical Decision Making  HISTORIAN:  Patient    CHART REVIEW:  No pertinent findings    PT SUMMARY:  74-year-old male presented ED with concerns for lightheadedness.  Vital signs stable.    DDX:  BPPV, labyrinthitis, vestibular neuritis, posterior stroke, intracranial bleed, intracranial mass, electrolyte abnormality, ACS      PLAN:  Will obtain CBC, BMP, EKG, troponin, CT brain    DISPO/RE-EVAL:  Patient's labs show no significant abnormality.  His creatinine is slightly elevated however not JOSE definition.  Otherwise labs show no other significant issue.  CT brain negative for acute pathology.  Patient able to ambulate at his baseline here.  Since he is asymptomatic and at his baseline believe he stable be discharged home.  Recommend following up with primary care.  Advised to come back to ED for any new or worsening symptoms.          Procedure  Procedures     Jj Walls DO  12/22/24 1445

## 2024-12-23 LAB — HOLD SPECIMEN: NORMAL

## 2024-12-24 ENCOUNTER — APPOINTMENT (OUTPATIENT)
Dept: HEMATOLOGY/ONCOLOGY | Facility: CLINIC | Age: 74
End: 2024-12-24
Payer: MEDICARE

## 2024-12-24 ENCOUNTER — INFUSION (OUTPATIENT)
Dept: HEMATOLOGY/ONCOLOGY | Facility: CLINIC | Age: 74
End: 2024-12-24
Payer: MEDICARE

## 2024-12-24 VITALS
SYSTOLIC BLOOD PRESSURE: 147 MMHG | BODY MASS INDEX: 28.03 KG/M2 | RESPIRATION RATE: 16 BRPM | OXYGEN SATURATION: 100 % | HEART RATE: 70 BPM | TEMPERATURE: 96.1 F | DIASTOLIC BLOOD PRESSURE: 75 MMHG | HEIGHT: 74 IN

## 2024-12-24 DIAGNOSIS — D3A.8 NEUROENDOCRINE TUMOR: ICD-10-CM

## 2024-12-24 PROCEDURE — 96372 THER/PROPH/DIAG INJ SC/IM: CPT

## 2024-12-24 PROCEDURE — 2500000004 HC RX 250 GENERAL PHARMACY W/ HCPCS (ALT 636 FOR OP/ED): Mod: JZ,JG | Performed by: INTERNAL MEDICINE

## 2024-12-24 RX ORDER — DIPHENHYDRAMINE HYDROCHLORIDE 50 MG/ML
50 INJECTION INTRAMUSCULAR; INTRAVENOUS AS NEEDED
OUTPATIENT
Start: 2024-12-25

## 2024-12-24 RX ORDER — ALBUTEROL SULFATE 0.83 MG/ML
3 SOLUTION RESPIRATORY (INHALATION) AS NEEDED
OUTPATIENT
Start: 2024-12-25

## 2024-12-24 RX ORDER — FAMOTIDINE 10 MG/ML
20 INJECTION INTRAVENOUS ONCE AS NEEDED
OUTPATIENT
Start: 2024-12-25

## 2024-12-24 RX ORDER — EPINEPHRINE 0.3 MG/.3ML
0.3 INJECTION SUBCUTANEOUS EVERY 5 MIN PRN
OUTPATIENT
Start: 2024-12-25

## 2024-12-24 RX ORDER — LANREOTIDE ACETATE 120 MG/.5ML
120 INJECTION SUBCUTANEOUS ONCE
Status: COMPLETED | OUTPATIENT
Start: 2024-12-24 | End: 2024-12-24

## 2024-12-24 RX ORDER — LANREOTIDE ACETATE 120 MG/.5ML
120 INJECTION SUBCUTANEOUS ONCE
OUTPATIENT
Start: 2024-12-25

## 2024-12-24 ASSESSMENT — PAIN SCALES - GENERAL
PAINLEVEL_OUTOF10: 0-NO PAIN
PAINLEVEL_OUTOF10: 0-NO PAIN

## 2024-12-24 NOTE — PROGRESS NOTES
Pt arrived awake, alert, oriented, no apparent distress with unlabored breaths. Pt reports feeling well today without s/sx of illness. Pt here for his Q 28 day lanreotide injection, in which he received in left buttock, and tolerated well as of thus far. Pt without further questions or concerns, discharged in stable condition with next appointments set for FUV on 1/15/25, and next injection on 1/22/25.

## 2025-01-15 ENCOUNTER — OFFICE VISIT (OUTPATIENT)
Dept: HEMATOLOGY/ONCOLOGY | Facility: CLINIC | Age: 75
End: 2025-01-15
Payer: MEDICARE

## 2025-01-15 VITALS
WEIGHT: 226.63 LBS | OXYGEN SATURATION: 100 % | RESPIRATION RATE: 16 BRPM | BODY MASS INDEX: 29.09 KG/M2 | HEART RATE: 71 BPM | DIASTOLIC BLOOD PRESSURE: 78 MMHG | TEMPERATURE: 97.2 F | SYSTOLIC BLOOD PRESSURE: 135 MMHG | HEIGHT: 74 IN

## 2025-01-15 DIAGNOSIS — N40.0 PROSTATE ENLARGEMENT: ICD-10-CM

## 2025-01-15 DIAGNOSIS — D3A.8 NEUROENDOCRINE TUMOR: ICD-10-CM

## 2025-01-15 DIAGNOSIS — K76.9 LIVER LESION: ICD-10-CM

## 2025-01-15 DIAGNOSIS — R16.0 LIVER MASS: ICD-10-CM

## 2025-01-15 LAB
ALBUMIN SERPL BCP-MCNC: 4.1 G/DL (ref 3.4–5)
ALP SERPL-CCNC: 102 U/L (ref 33–136)
ALT SERPL W P-5'-P-CCNC: 26 U/L (ref 10–52)
ANION GAP SERPL CALC-SCNC: 9 MMOL/L (ref 10–20)
AST SERPL W P-5'-P-CCNC: 18 U/L (ref 9–39)
BASOPHILS # BLD AUTO: 0.04 X10*3/UL (ref 0–0.1)
BASOPHILS NFR BLD AUTO: 0.6 %
BILIRUB SERPL-MCNC: 0.5 MG/DL (ref 0–1.2)
BUN SERPL-MCNC: 23 MG/DL (ref 6–23)
CALCIUM SERPL-MCNC: 8.8 MG/DL (ref 8.6–10.3)
CHLORIDE SERPL-SCNC: 102 MMOL/L (ref 98–107)
CO2 SERPL-SCNC: 27 MMOL/L (ref 21–32)
CREAT SERPL-MCNC: 1.24 MG/DL (ref 0.5–1.3)
EGFRCR SERPLBLD CKD-EPI 2021: 60 ML/MIN/1.73M*2
EOSINOPHIL # BLD AUTO: 0.18 X10*3/UL (ref 0–0.4)
EOSINOPHIL NFR BLD AUTO: 2.7 %
ERYTHROCYTE [DISTWIDTH] IN BLOOD BY AUTOMATED COUNT: 14.5 % (ref 11.5–14.5)
GLUCOSE SERPL-MCNC: 323 MG/DL (ref 74–99)
HCT VFR BLD AUTO: 37.1 % (ref 41–52)
HGB BLD-MCNC: 12 G/DL (ref 13.5–17.5)
IMM GRANULOCYTES # BLD AUTO: 0.01 X10*3/UL (ref 0–0.5)
IMM GRANULOCYTES NFR BLD AUTO: 0.2 % (ref 0–0.9)
LYMPHOCYTES # BLD AUTO: 1.38 X10*3/UL (ref 0.8–3)
LYMPHOCYTES NFR BLD AUTO: 20.8 %
MCH RBC QN AUTO: 25.6 PG (ref 26–34)
MCHC RBC AUTO-ENTMCNC: 32.3 G/DL (ref 32–36)
MCV RBC AUTO: 79 FL (ref 80–100)
MONOCYTES # BLD AUTO: 0.51 X10*3/UL (ref 0.05–0.8)
MONOCYTES NFR BLD AUTO: 7.7 %
NEUTROPHILS # BLD AUTO: 4.51 X10*3/UL (ref 1.6–5.5)
NEUTROPHILS NFR BLD AUTO: 68 %
PLATELET # BLD AUTO: 186 X10*3/UL (ref 150–450)
POTASSIUM SERPL-SCNC: 4.2 MMOL/L (ref 3.5–5.3)
PROT SERPL-MCNC: 6.5 G/DL (ref 6.4–8.2)
RBC # BLD AUTO: 4.68 X10*6/UL (ref 4.5–5.9)
SODIUM SERPL-SCNC: 134 MMOL/L (ref 136–145)
WBC # BLD AUTO: 6.6 X10*3/UL (ref 4.4–11.3)

## 2025-01-15 PROCEDURE — 1123F ACP DISCUSS/DSCN MKR DOCD: CPT | Performed by: INTERNAL MEDICINE

## 2025-01-15 PROCEDURE — 85025 COMPLETE CBC W/AUTO DIFF WBC: CPT | Performed by: INTERNAL MEDICINE

## 2025-01-15 PROCEDURE — 36415 COLL VENOUS BLD VENIPUNCTURE: CPT | Performed by: INTERNAL MEDICINE

## 2025-01-15 PROCEDURE — 1126F AMNT PAIN NOTED NONE PRSNT: CPT | Performed by: INTERNAL MEDICINE

## 2025-01-15 PROCEDURE — 86316 IMMUNOASSAY TUMOR OTHER: CPT | Performed by: INTERNAL MEDICINE

## 2025-01-15 PROCEDURE — 99214 OFFICE O/P EST MOD 30 MIN: CPT | Performed by: INTERNAL MEDICINE

## 2025-01-15 PROCEDURE — 80053 COMPREHEN METABOLIC PANEL: CPT | Performed by: INTERNAL MEDICINE

## 2025-01-15 PROCEDURE — 1160F RVW MEDS BY RX/DR IN RCRD: CPT | Performed by: INTERNAL MEDICINE

## 2025-01-15 PROCEDURE — 1159F MED LIST DOCD IN RCRD: CPT | Performed by: INTERNAL MEDICINE

## 2025-01-15 ASSESSMENT — PAIN SCALES - GENERAL: PAINLEVEL_OUTOF10: 0-NO PAIN

## 2025-01-15 NOTE — PROGRESS NOTES
Patient ID: Juan Varela is a 74 y.o. male.  Referring Physician: Juan David Turner MD  [address] [facility], [address]  [place] [phone]  Primary Care Provider: Roseanna Durbin DO    Oncology history  #1 metastatic neuroendocrine tumor  Presented with syncope, CAT scan of chest abdomen pelvis did show rectal wall thickening and ill-defined liver lesions  8/20/24 , colonoscopy normal no any abnormal pathology  7/26/24 , MRI of liver,Multiple (approximately 6) bilobar enhancing liver lesions, concerning for metastases.  9/9/24  , liver biopsy, well-differentiated neuroendocrine tumor involving liver, WHO grade 2 , Immunostains show that the tumor cells are positive for CKAE1/3, synaptophysin, chromogranin and INSM1, while negative for GATA3, NKX3.1, TTF-1, CK7, and CK20. The immunoprofile is consistent with a neuroendocrine neoplasm.   The ki-67 index is 3.6%   10/03/24 , NETSPOT 1.  Multiple Dotatate avid hypodense hepatic masses compatible with metastatic neuroendocrine malignancy. 2. An enlarged Dotatate avid small-bowel mesenteric lymph node iscompatible with katerina malignancy of neuroendocrine origin.  3. No definite Dotatate avid lesion within the small bowel, pancreas, or elsewhere within the abdomen and pelvis to suggest the site of primary malignancy.  Lanreotide started on 10/31/24  Subjective    HPI   Juan Varela is a 74 y.o. male with PMH of afib, DM2, HTN, afib, and hypothyroidism who was   seen in ER 7/7/24 for near syncope. While in ER, he reported abdominal pain. CT A/P noted diffuse rectal wall thickening and multifocal ill-defined liver lesions c/f metastasis. He was discharged with follow up recommendations to oncology and GI.    GI evaluation done, 8/20/24 , colonoscopy normal no any abnormal pathology  7/26/24 , MRI of liver,Multiple (approximately 6) bilobar enhancing liver lesions,  concerning for metastases.    Patient is doing very well patient denies any headache no  dizziness no nausea vomiting, no chest pain, no shortness of breath, nonspecific abdominal pain which happened couple of time, no diarrhea and constipation no rectal bleed something patient loose bowel movement, no dysuria and hematuria, history of weight loss now body weight is stable     He was hospitalized in June 2024 for afib, hypoglycemia, and near syncope. He is currently on sotolol. His most recent EKG 7/7/24 showed NSR. Qtc has returned to normal.       Interval history  1/15/25  CAT scan of chest abdominal pelvis did show multiple hepatic lesion which were confirmed by MRI.  Liver biopsy done pathology positive for well-differentiated neuroendocrine tumor.     No chest pain no shortness of breath, no skin rash, no diarrhea, no hot flashes patient is asymptomatic.  NETSPOT, positive for hepatic lesions and small mesenteric lymphadenopathy, no abnormal activity seen in the small intestine and colon, lungs, rectum.  Lanreotide was started on October 31, 2024 which is well-tolerated.  No nausea vomiting no abdominal pain patient has 1 bowel movement every day     Review of Systems:  Review of Systems   Constitutional:  Negative for chills and fever.   HENT:  Negative for sore throat and trouble swallowing.    Respiratory:  Negative for cough and shortness of breath.    Cardiovascular:  Negative for chest pain and palpitations.   Gastrointestinal:       Endocrine: Negative for cold intolerance and heat intolerance.   Genitourinary:  Negative for difficulty urinating.   Musculoskeletal:  Negative for arthralgias and joint swelling.   Skin:  Negative for rash and wound.   Neurological:  Negative for dizziness and weakness.   Hematological:  Negative for adenopathy. Does not bruise/bleed easily.   Psychiatric/Behavioral:  Negative for confusion.          Medications:          Prior to Admission medications    Medication Sig Start Date End Date Taking? Authorizing Provider   atorvastatin (Lipitor) 40 mg tablet TAKE  1 TABLET BY MOUTH EVERYDAY AT BEDTIME 3/8/24     Genesis Mancuso MD   empagliflozin (Jardiance) 25 mg Take 1 tablet (25 mg) by mouth once daily. 6/4/24 12/1/24   Genesis Mancuso MD   insulin glargine (Lantus) 100 unit/mL injection Inject 40 Units under the skin once daily in the morning. Take before meals. Take as directed per insulin instructions. Do not fill before June 29, 2024. 6/29/24     Rd Esquivel,    levothyroxine (Synthroid, Levoxyl) 150 mcg tablet TAKE 1 TABLET BY MOUTH EVERY DAY AS DIRECTED 5/24/24     Genesis Mancuso MD   lisinopril 2.5 mg tablet TAKE 1 TABLET BY MOUTH ONCE DAILY. 4/26/24     Genesis Mancuso MD   pioglitazone (Actos) 45 mg tablet TAKE 1 TABLET (45 MG) BY MOUTH ONCE DAILY 5/24/24 11/20/24   Genesis Mancuso MD   sotalol (Betapace) 120 mg tablet TAKE 1/2 TABLET (60 MG) BY MOUTH 2 TIMES A DAY. 3/6/24     Chet Ley PA-C         Allergies:  Patient has no known allergies.     Past Medical History:  He has a past medical history of Atrial fibrillation (Multi), Diabetes mellitus (Multi), and Hypertension.     Past Surgical History:  He has a past surgical history that includes Other surgical history (09/02/2021).     Social History:  He reports that he has never smoked. He has never been exposed to tobacco smoke. He has never used smokeless tobacco. He reports that he does not drink alcohol and does not use drugs.        Review of Systems - Oncology   Review of Systems:  Review of Systems   Constitutional:  Negative for chills and fever.   HENT:  Negative for sore throat and trouble swallowing.    Respiratory:  Negative for cough and shortness of breath.    Cardiovascular:  Negative for chest pain and palpitations.   Gastrointestinal:       Endocrine: Negative for cold intolerance and heat intolerance.   Genitourinary:  Negative for difficulty urinating.   Musculoskeletal:  Negative for arthralgias and joint swelling.  "  Skin:  Negative for rash and wound.   Neurological:  Negative for dizziness and weakness.   Hematological:  Negative for adenopathy. Does not bruise/bleed easily.   Psychiatric/Behavioral:  Negative for confusion.      Medications:          Prior to Admission medications    Medication Sig Start Date End Date Taking? Authorizing Provider   atorvastatin (Lipitor) 40 mg tablet TAKE 1 TABLET BY MOUTH EVERYDAY AT BEDTIME 3/8/24     Genesis Mancuso MD   empagliflozin (Jardiance) 25 mg Take 1 tablet (25 mg) by mouth once daily. 6/4/24 12/1/24   Genesis Mancuso MD   insulin glargine (Lantus) 100 unit/mL injection Inject 40 Units under the skin once daily in the morning. Take before meals. Take as directed per insulin instructions. Do not fill before June 29, 2024. 6/29/24     Rd Esquivel,    levothyroxine (Synthroid, Levoxyl) 150 mcg tablet TAKE 1 TABLET BY MOUTH EVERY DAY AS DIRECTED 5/24/24     Genesis Mancuso MD   lisinopril 2.5 mg tablet TAKE 1 TABLET BY MOUTH ONCE DAILY. 4/26/24     Genesis Mancuso MD   pioglitazone (Actos) 45 mg tablet TAKE 1 TABLET (45 MG) BY MOUTH ONCE DAILY 5/24/24 11/20/24   Genesis Mancuso MD   sotalol (Betapace) 120 mg tablet TAKE 1/2 TABLET (60 MG) BY MOUTH 2 TIMES A DAY. 3/6/24     Chet Ley PA-C      Allergies:  Patient has no known allergies.      Family History   Family history unknown: Yes     Juan Varela  reports that he has never smoked. He has never been exposed to tobacco smoke. He has never used smokeless tobacco.  He  reports no history of alcohol use.  He  reports no history of drug use.    Objective   BSA: 2.32 meters squared  /77 (BP Location: Left arm, Patient Position: Sitting)   Pulse 79   Temp 36.7 °C (98.1 °F) (Temporal)   Resp 16   Ht (S) 1.887 m (6' 2.29\")   Wt 103 kg (227 lb 15.3 oz)   SpO2 98%   BMI 29.04 kg/m²     Physical Exam  Vitals are stable    HEENT examination normal " limit    Neck is supple, no carotid bruit, no thyromegaly no cervical lymphadenopathy    Lungs clear on the both side no wheezing    Heart with normal regular rhythm    Abdomen is soft, nontender no hepatosplenomegaly, no any other masses noted, normotonic bowel sound    Extremity patient with trace edema left lower extremity    Neuro examination nonfocal    Lymphatic no peripheral lymphadenopathy    Skin examination did show dry skin no rash no pigmented lesion    Labs      00  (1/15/25) 3 wk ago  (12/22/24) 3 mo ago  (10/17/24) 3 mo ago  (9/19/24) 6 mo ago  (7/7/24) 6 mo ago  (6/27/24) 2 yr ago  (8/9/22)    WBC  4.4 - 11.3 x10*3/uL 6.6 9.3 6.9 6.3 13.1 High  10.2 6.3 R   RBC  4.50 - 5.90 x10*6/uL 4.68 4.65 4.69 4.61 5.74 5.00 5.19 R   Hemoglobin  13.5 - 17.5 g/dL 12.0 Low  11.9 Low  12.0 Low  12.1 Low  15.8 13.6 15.1   Hematocrit  41.0 - 52.0 % 37.1 Low  36.0 Low  38.5 Low  37.4 Low  47.1 41.1 43.6   MCV  80 - 100 fL 79 Low  77 Low  82 81 82 82 84   MCH  26.0 - 34.0 pg 25.6 Low  25.6 Low  25.6 Low  26.2 27.5 27.2    MCHC  32.0 - 36.0 g/dL 32.3 33.1 31.2 Low  32.4 33.5 33.1 34.6   RDW  11.5 - 14.5 % 14.5 14.6 High  13.7 13.7 13.9 13.3 13.0   Platelets  150 - 450 x10*3/uL 186             Radiology report  CT abdominal pelvis,Multifocal ill-defined lesions within the liver. Findings are  concerning for malignancy/metastasis. Dedicated contrast-enhanced  liver MRI is suggested for further evaluation.      Diffuse rectal wall thickening noted which could be related to  proctitis however correlation for neoplasia is suggested. Inspissated  stool within the rectum with mild inflammatory changes of the distal  colon noted. Correlation for proctocolitis also recommended    MRI of liver,Multiple (approximately 6) bilobar enhancing liver lesions,  concerning for metastases.  Performance Status: ECOG 0  Pathology  FINAL DIAGNOSIS   A.  Liver, mass, biopsy:  -Well-differentiated neuroendocrine tumor involving the liver, WHO  grade 2 (see comment).     Comment:  Immunostains show that the tumor cells are positive for CKAE1/3, synaptophysin, chromogranin and INSM1, while negative for GATA3, NKX3.1, TTF-1, CK7, and CK20.  The immunoprofile is consistent with a neuroendocrine neoplasm. The ki-67 index is 3.6%   NETSPOT  1.  Multiple Dotatate avid hypodense hepatic masses compatible with  metastatic neuroendocrine malignancy.  2. An enlarged Dotatate avid small-bowel mesenteric lymph node is  compatible with katerina malignancy of neuroendocrine origin.  3. No definite Dotatate avid lesion within the small bowel, pancreas,  or elsewhere within the abdomen and pelvis to suggest the site of  primary malignancy.  Assessment/Plan      ##1 metastatic neuroendocrine tumor  Presented with syncope, CAT scan of chest abdomen pelvis did show rectal wall thickening and ill-defined liver lesions  8/20/24 , colonoscopy normal no any abnormal pathology  7/26/24 , MRI of liver,Multiple (approximately 6) bilobar enhancing liver lesions, concerning for metastases.  9/9/24 , liver biopsy, well-differentiated neuroendocrine tumor involving liver, WHO grade 2         lanreotide every 28 on 10/31/24      Plan , 1/15/25  Patient not interested for redo ablative therapy.  Will continue lanreotide, monitor chromogranin level.  Will schedule for MRI of liver after 3 months.  Follow-up after MRI.  Plan discussed with the patient.         time spent 30 minutes  Juan David Turner MD

## 2025-01-15 NOTE — PATIENT INSTRUCTIONS
Follow up visit for history of metastatic neuroendocrine tumor.     Continue treatment with lanreotide injections every 28 days.     Follow up with Dr. Turner in April 2025 with lanreotide injection to review MRI results.

## 2025-01-22 ENCOUNTER — INFUSION (OUTPATIENT)
Dept: HEMATOLOGY/ONCOLOGY | Facility: CLINIC | Age: 75
End: 2025-01-22
Payer: MEDICARE

## 2025-01-22 VITALS
SYSTOLIC BLOOD PRESSURE: 148 MMHG | DIASTOLIC BLOOD PRESSURE: 78 MMHG | OXYGEN SATURATION: 96 % | HEART RATE: 76 BPM | HEIGHT: 74 IN | BODY MASS INDEX: 28.95 KG/M2 | TEMPERATURE: 96.6 F | RESPIRATION RATE: 16 BRPM | WEIGHT: 225.6 LBS

## 2025-01-22 DIAGNOSIS — D3A.8 NEUROENDOCRINE TUMOR: ICD-10-CM

## 2025-01-22 PROCEDURE — 2500000004 HC RX 250 GENERAL PHARMACY W/ HCPCS (ALT 636 FOR OP/ED): Mod: JZ,TB | Performed by: INTERNAL MEDICINE

## 2025-01-22 PROCEDURE — 96372 THER/PROPH/DIAG INJ SC/IM: CPT

## 2025-01-22 RX ORDER — ALBUTEROL SULFATE 0.83 MG/ML
3 SOLUTION RESPIRATORY (INHALATION) AS NEEDED
OUTPATIENT
Start: 2025-02-18

## 2025-01-22 RX ORDER — LANREOTIDE ACETATE 120 MG/.5ML
120 INJECTION SUBCUTANEOUS ONCE
Status: COMPLETED | OUTPATIENT
Start: 2025-01-22 | End: 2025-01-22

## 2025-01-22 RX ORDER — LANREOTIDE ACETATE 120 MG/.5ML
120 INJECTION SUBCUTANEOUS ONCE
OUTPATIENT
Start: 2025-02-18

## 2025-01-22 RX ORDER — EPINEPHRINE 0.3 MG/.3ML
0.3 INJECTION SUBCUTANEOUS EVERY 5 MIN PRN
OUTPATIENT
Start: 2025-02-18

## 2025-01-22 RX ORDER — DIPHENHYDRAMINE HYDROCHLORIDE 50 MG/ML
50 INJECTION INTRAMUSCULAR; INTRAVENOUS AS NEEDED
OUTPATIENT
Start: 2025-02-18

## 2025-01-22 RX ORDER — FAMOTIDINE 10 MG/ML
20 INJECTION INTRAVENOUS ONCE AS NEEDED
OUTPATIENT
Start: 2025-02-18

## 2025-01-22 RX ADMIN — LANREOTIDE ACETATE 120 MG: 120 INJECTION SUBCUTANEOUS at 14:10

## 2025-01-22 ASSESSMENT — PAIN SCALES - GENERAL: PAINLEVEL_OUTOF10: 0-NO PAIN

## 2025-01-22 NOTE — PROGRESS NOTES
Patient identified by name &    Patient denies acute distress- none noted at this time. Site care given with band aid. Patient returns every 28 days. Patient discharged home in stable condition.

## 2025-01-24 LAB — CHROMOGRANIN A, LC/MS/MS: 455 NG/ML

## 2025-02-18 ENCOUNTER — INFUSION (OUTPATIENT)
Dept: HEMATOLOGY/ONCOLOGY | Facility: CLINIC | Age: 75
End: 2025-02-18
Payer: MEDICARE

## 2025-02-18 VITALS
HEART RATE: 88 BPM | OXYGEN SATURATION: 97 % | SYSTOLIC BLOOD PRESSURE: 143 MMHG | HEIGHT: 74 IN | BODY MASS INDEX: 28.99 KG/M2 | RESPIRATION RATE: 16 BRPM | DIASTOLIC BLOOD PRESSURE: 82 MMHG | WEIGHT: 225.9 LBS | TEMPERATURE: 97.2 F

## 2025-02-18 DIAGNOSIS — D3A.8 NEUROENDOCRINE TUMOR: ICD-10-CM

## 2025-02-18 PROCEDURE — 2500000004 HC RX 250 GENERAL PHARMACY W/ HCPCS (ALT 636 FOR OP/ED): Mod: JZ,TB | Performed by: INTERNAL MEDICINE

## 2025-02-18 PROCEDURE — 96372 THER/PROPH/DIAG INJ SC/IM: CPT

## 2025-02-18 RX ORDER — EPINEPHRINE 0.3 MG/.3ML
0.3 INJECTION SUBCUTANEOUS EVERY 5 MIN PRN
OUTPATIENT
Start: 2025-02-19

## 2025-02-18 RX ORDER — ALBUTEROL SULFATE 0.83 MG/ML
3 SOLUTION RESPIRATORY (INHALATION) AS NEEDED
OUTPATIENT
Start: 2025-02-19

## 2025-02-18 RX ORDER — DIPHENHYDRAMINE HYDROCHLORIDE 50 MG/ML
50 INJECTION INTRAMUSCULAR; INTRAVENOUS AS NEEDED
OUTPATIENT
Start: 2025-02-19

## 2025-02-18 RX ORDER — FAMOTIDINE 10 MG/ML
20 INJECTION, SOLUTION INTRAVENOUS ONCE AS NEEDED
OUTPATIENT
Start: 2025-02-19

## 2025-02-18 RX ORDER — LANREOTIDE ACETATE 120 MG/.5ML
120 INJECTION SUBCUTANEOUS ONCE
OUTPATIENT
Start: 2025-02-18

## 2025-02-18 RX ORDER — LANREOTIDE ACETATE 120 MG/.5ML
120 INJECTION SUBCUTANEOUS ONCE
Status: COMPLETED | OUTPATIENT
Start: 2025-02-18 | End: 2025-02-18

## 2025-02-18 RX ADMIN — LANREOTIDE ACETATE 120 MG: 120 INJECTION SUBCUTANEOUS at 12:32

## 2025-02-18 ASSESSMENT — PAIN SCALES - GENERAL: PAINLEVEL_OUTOF10: 0-NO PAIN

## 2025-02-18 NOTE — PROGRESS NOTES
Patient presents for lanreotide treatment,  has no complaints alert and oriented x 4. Patient meets parameters for treatment. Patient tolerated treatment well, no reaction noted. Patient feels well and has no complaints, vital signs stable. Patient given medication information from [facility]. Patient verbalized understanding of all teaching and education. Returns in 28 days for next shot. Patient discharged in stable condition with no further needs.      Shot given in left upper buttock

## 2025-03-18 ENCOUNTER — INFUSION (OUTPATIENT)
Dept: HEMATOLOGY/ONCOLOGY | Facility: CLINIC | Age: 75
End: 2025-03-18
Payer: MEDICARE

## 2025-03-18 VITALS
WEIGHT: 228.9 LBS | OXYGEN SATURATION: 100 % | TEMPERATURE: 97.7 F | DIASTOLIC BLOOD PRESSURE: 75 MMHG | HEART RATE: 67 BPM | HEIGHT: 74 IN | RESPIRATION RATE: 16 BRPM | SYSTOLIC BLOOD PRESSURE: 141 MMHG | BODY MASS INDEX: 29.38 KG/M2

## 2025-03-18 DIAGNOSIS — D3A.8 NEUROENDOCRINE TUMOR: ICD-10-CM

## 2025-03-18 PROCEDURE — 96372 THER/PROPH/DIAG INJ SC/IM: CPT

## 2025-03-18 PROCEDURE — 2500000004 HC RX 250 GENERAL PHARMACY W/ HCPCS (ALT 636 FOR OP/ED): Mod: JZ,TB | Performed by: INTERNAL MEDICINE

## 2025-03-18 RX ORDER — EPINEPHRINE 0.3 MG/.3ML
0.3 INJECTION SUBCUTANEOUS EVERY 5 MIN PRN
OUTPATIENT
Start: 2025-04-15

## 2025-03-18 RX ORDER — ALBUTEROL SULFATE 0.83 MG/ML
3 SOLUTION RESPIRATORY (INHALATION) AS NEEDED
OUTPATIENT
Start: 2025-04-15

## 2025-03-18 RX ORDER — LANREOTIDE ACETATE 120 MG/.5ML
120 INJECTION SUBCUTANEOUS ONCE
Status: COMPLETED | OUTPATIENT
Start: 2025-03-18 | End: 2025-03-18

## 2025-03-18 RX ORDER — DIPHENHYDRAMINE HYDROCHLORIDE 50 MG/ML
50 INJECTION, SOLUTION INTRAMUSCULAR; INTRAVENOUS AS NEEDED
OUTPATIENT
Start: 2025-04-15

## 2025-03-18 RX ORDER — LANREOTIDE ACETATE 120 MG/.5ML
120 INJECTION SUBCUTANEOUS ONCE
OUTPATIENT
Start: 2025-04-15

## 2025-03-18 RX ORDER — FAMOTIDINE 10 MG/ML
20 INJECTION, SOLUTION INTRAVENOUS ONCE AS NEEDED
OUTPATIENT
Start: 2025-04-15

## 2025-03-18 RX ADMIN — LANREOTIDE ACETATE 120 MG: 120 INJECTION SUBCUTANEOUS at 14:18

## 2025-03-18 ASSESSMENT — PAIN SCALES - GENERAL: PAINLEVEL_OUTOF10: 0-NO PAIN

## 2025-03-18 NOTE — PROGRESS NOTES
Patient here for 6th lanreotide injection. Patient tolerated well, injection given Right side. AVS given to patient, he will return same day as follow up visit. Discharged in stable condition.

## 2025-03-19 ENCOUNTER — APPOINTMENT (OUTPATIENT)
Dept: HEMATOLOGY/ONCOLOGY | Facility: CLINIC | Age: 75
End: 2025-03-19
Payer: MEDICARE

## 2025-03-24 DIAGNOSIS — E11.9 TYPE 2 DIABETES MELLITUS WITHOUT COMPLICATION, UNSPECIFIED WHETHER LONG TERM INSULIN USE: ICD-10-CM

## 2025-03-24 DIAGNOSIS — E11.649 TYPE 2 DIABETES MELLITUS WITH HYPOGLYCEMIA WITHOUT COMA, WITH LONG-TERM CURRENT USE OF INSULIN: Chronic | ICD-10-CM

## 2025-03-24 DIAGNOSIS — Z79.4 TYPE 2 DIABETES MELLITUS WITH HYPOGLYCEMIA WITHOUT COMA, WITH LONG-TERM CURRENT USE OF INSULIN: Chronic | ICD-10-CM

## 2025-03-24 NOTE — TELEPHONE ENCOUNTER
Mr. Varela is a saying that the cost of  Jardiance is too high and that he will stop taking the medication  if there is not a generic brand for him to take.

## 2025-03-24 NOTE — TELEPHONE ENCOUNTER
Mr. Varela would like a refill for Lantus U-100 (90 day Rx) and Empagliflozin (generic brand) 25 mg (90 tablets) sent to [id number].    Next appointment 6/5/25.

## 2025-03-25 RX ORDER — INSULIN GLARGINE 100 [IU]/ML
40 INJECTION, SOLUTION SUBCUTANEOUS NIGHTLY
Qty: 20 ML | Refills: 5 | Status: SHIPPED | OUTPATIENT
Start: 2025-03-25 | End: 2025-09-21

## 2025-04-08 ENCOUNTER — HOSPITAL ENCOUNTER (OUTPATIENT)
Dept: RADIOLOGY | Facility: HOSPITAL | Age: 75
Discharge: HOME | End: 2025-04-08
Payer: MEDICARE

## 2025-04-08 DIAGNOSIS — D3A.8 NEUROENDOCRINE TUMOR: ICD-10-CM

## 2025-04-08 DIAGNOSIS — K76.9 LIVER LESION: ICD-10-CM

## 2025-04-08 DIAGNOSIS — N40.0 PROSTATE ENLARGEMENT: ICD-10-CM

## 2025-04-08 DIAGNOSIS — R16.0 LIVER MASS: ICD-10-CM

## 2025-04-08 PROCEDURE — 2550000001 HC RX 255 CONTRASTS: Performed by: INTERNAL MEDICINE

## 2025-04-08 PROCEDURE — 74183 MRI ABD W/O CNTR FLWD CNTR: CPT

## 2025-04-08 PROCEDURE — A9575 INJ GADOTERATE MEGLUMI 0.1ML: HCPCS | Performed by: INTERNAL MEDICINE

## 2025-04-08 RX ORDER — GADOTERATE MEGLUMINE 376.9 MG/ML
0.2 INJECTION INTRAVENOUS
Status: COMPLETED | OUTPATIENT
Start: 2025-04-08 | End: 2025-04-08

## 2025-04-08 RX ORDER — GADOTERATE MEGLUMINE 376.9 MG/ML
0.2 INJECTION INTRAVENOUS
Status: DISCONTINUED | OUTPATIENT
Start: 2025-04-08 | End: 2025-04-09 | Stop reason: HOSPADM

## 2025-04-08 RX ADMIN — GADOTERATE MEGLUMINE 20 ML: 376.9 INJECTION INTRAVENOUS at 13:10

## 2025-04-15 ENCOUNTER — INFUSION (OUTPATIENT)
Dept: HEMATOLOGY/ONCOLOGY | Facility: CLINIC | Age: 75
End: 2025-04-15
Payer: MEDICARE

## 2025-04-15 ENCOUNTER — OFFICE VISIT (OUTPATIENT)
Dept: HEMATOLOGY/ONCOLOGY | Facility: CLINIC | Age: 75
End: 2025-04-15
Payer: MEDICARE

## 2025-04-15 ENCOUNTER — APPOINTMENT (OUTPATIENT)
Dept: HEMATOLOGY/ONCOLOGY | Facility: CLINIC | Age: 75
End: 2025-04-15
Payer: MEDICARE

## 2025-04-15 VITALS
TEMPERATURE: 97.9 F | BODY MASS INDEX: 29.09 KG/M2 | HEIGHT: 74 IN | OXYGEN SATURATION: 100 % | DIASTOLIC BLOOD PRESSURE: 79 MMHG | HEART RATE: 78 BPM | SYSTOLIC BLOOD PRESSURE: 138 MMHG | WEIGHT: 226.63 LBS | RESPIRATION RATE: 16 BRPM

## 2025-04-15 DIAGNOSIS — D3A.8 NEUROENDOCRINE TUMOR: ICD-10-CM

## 2025-04-15 DIAGNOSIS — K76.9 LIVER LESION: ICD-10-CM

## 2025-04-15 DIAGNOSIS — N40.0 PROSTATE ENLARGEMENT: ICD-10-CM

## 2025-04-15 DIAGNOSIS — R16.0 LIVER MASS: ICD-10-CM

## 2025-04-15 LAB
ALBUMIN SERPL BCP-MCNC: 4.2 G/DL (ref 3.4–5)
ALP SERPL-CCNC: 90 U/L (ref 33–136)
ALT SERPL W P-5'-P-CCNC: 27 U/L (ref 10–52)
ANION GAP SERPL CALC-SCNC: 14 MMOL/L (ref 10–20)
AST SERPL W P-5'-P-CCNC: 21 U/L (ref 9–39)
BASOPHILS # BLD AUTO: 0.05 X10*3/UL (ref 0–0.1)
BASOPHILS NFR BLD AUTO: 0.7 %
BILIRUB SERPL-MCNC: 0.6 MG/DL (ref 0–1.2)
BUN SERPL-MCNC: 21 MG/DL (ref 6–23)
CALCIUM SERPL-MCNC: 9 MG/DL (ref 8.6–10.3)
CHLORIDE SERPL-SCNC: 99 MMOL/L (ref 98–107)
CO2 SERPL-SCNC: 26 MMOL/L (ref 21–32)
CREAT SERPL-MCNC: 1.39 MG/DL (ref 0.5–1.3)
EGFRCR SERPLBLD CKD-EPI 2021: 53 ML/MIN/1.73M*2
EOSINOPHIL # BLD AUTO: 0.19 X10*3/UL (ref 0–0.4)
EOSINOPHIL NFR BLD AUTO: 2.5 %
ERYTHROCYTE [DISTWIDTH] IN BLOOD BY AUTOMATED COUNT: 13.9 % (ref 11.5–14.5)
GLUCOSE SERPL-MCNC: 289 MG/DL (ref 74–99)
HCT VFR BLD AUTO: 38.3 % (ref 41–52)
HGB BLD-MCNC: 12.2 G/DL (ref 13.5–17.5)
IMM GRANULOCYTES # BLD AUTO: 0.01 X10*3/UL (ref 0–0.5)
IMM GRANULOCYTES NFR BLD AUTO: 0.1 % (ref 0–0.9)
LYMPHOCYTES # BLD AUTO: 1.29 X10*3/UL (ref 0.8–3)
LYMPHOCYTES NFR BLD AUTO: 17.1 %
MCH RBC QN AUTO: 25.4 PG (ref 26–34)
MCHC RBC AUTO-ENTMCNC: 31.9 G/DL (ref 32–36)
MCV RBC AUTO: 80 FL (ref 80–100)
MONOCYTES # BLD AUTO: 0.52 X10*3/UL (ref 0.05–0.8)
MONOCYTES NFR BLD AUTO: 6.9 %
NEUTROPHILS # BLD AUTO: 5.49 X10*3/UL (ref 1.6–5.5)
NEUTROPHILS NFR BLD AUTO: 72.7 %
PLATELET # BLD AUTO: 191 X10*3/UL (ref 150–450)
POTASSIUM SERPL-SCNC: 4.3 MMOL/L (ref 3.5–5.3)
PROT SERPL-MCNC: 7 G/DL (ref 6.4–8.2)
RBC # BLD AUTO: 4.81 X10*6/UL (ref 4.5–5.9)
SODIUM SERPL-SCNC: 135 MMOL/L (ref 136–145)
WBC # BLD AUTO: 7.6 X10*3/UL (ref 4.4–11.3)

## 2025-04-15 PROCEDURE — 96372 THER/PROPH/DIAG INJ SC/IM: CPT

## 2025-04-15 PROCEDURE — 99214 OFFICE O/P EST MOD 30 MIN: CPT | Mod: 25 | Performed by: INTERNAL MEDICINE

## 2025-04-15 PROCEDURE — 85025 COMPLETE CBC W/AUTO DIFF WBC: CPT | Performed by: INTERNAL MEDICINE

## 2025-04-15 PROCEDURE — 86316 IMMUNOASSAY TUMOR OTHER: CPT | Performed by: INTERNAL MEDICINE

## 2025-04-15 PROCEDURE — 1159F MED LIST DOCD IN RCRD: CPT | Performed by: INTERNAL MEDICINE

## 2025-04-15 PROCEDURE — 36415 COLL VENOUS BLD VENIPUNCTURE: CPT | Performed by: INTERNAL MEDICINE

## 2025-04-15 PROCEDURE — 1160F RVW MEDS BY RX/DR IN RCRD: CPT | Performed by: INTERNAL MEDICINE

## 2025-04-15 PROCEDURE — 1126F AMNT PAIN NOTED NONE PRSNT: CPT | Performed by: INTERNAL MEDICINE

## 2025-04-15 PROCEDURE — 1123F ACP DISCUSS/DSCN MKR DOCD: CPT | Performed by: INTERNAL MEDICINE

## 2025-04-15 PROCEDURE — 99214 OFFICE O/P EST MOD 30 MIN: CPT | Performed by: INTERNAL MEDICINE

## 2025-04-15 PROCEDURE — 80053 COMPREHEN METABOLIC PANEL: CPT | Performed by: INTERNAL MEDICINE

## 2025-04-15 PROCEDURE — 2500000004 HC RX 250 GENERAL PHARMACY W/ HCPCS (ALT 636 FOR OP/ED): Mod: JZ,TB | Performed by: INTERNAL MEDICINE

## 2025-04-15 RX ORDER — ALBUTEROL SULFATE 0.83 MG/ML
3 SOLUTION RESPIRATORY (INHALATION) AS NEEDED
OUTPATIENT
Start: 2025-05-13

## 2025-04-15 RX ORDER — LANREOTIDE ACETATE 120 MG/.5ML
120 INJECTION SUBCUTANEOUS ONCE
OUTPATIENT
Start: 2025-05-13

## 2025-04-15 RX ORDER — DIPHENHYDRAMINE HYDROCHLORIDE 50 MG/ML
50 INJECTION, SOLUTION INTRAMUSCULAR; INTRAVENOUS AS NEEDED
OUTPATIENT
Start: 2025-05-13

## 2025-04-15 RX ORDER — FAMOTIDINE 10 MG/ML
20 INJECTION, SOLUTION INTRAVENOUS ONCE AS NEEDED
OUTPATIENT
Start: 2025-05-13

## 2025-04-15 RX ORDER — LANREOTIDE ACETATE 120 MG/.5ML
120 INJECTION SUBCUTANEOUS ONCE
Status: COMPLETED | OUTPATIENT
Start: 2025-04-15 | End: 2025-04-15

## 2025-04-15 RX ORDER — EPINEPHRINE 0.3 MG/.3ML
0.3 INJECTION SUBCUTANEOUS EVERY 5 MIN PRN
OUTPATIENT
Start: 2025-05-13

## 2025-04-15 RX ADMIN — LANREOTIDE ACETATE 120 MG: 120 INJECTION SUBCUTANEOUS at 14:10

## 2025-04-15 ASSESSMENT — PAIN SCALES - GENERAL: PAINLEVEL_OUTOF10: 0-NO PAIN

## 2025-04-15 NOTE — PATIENT INSTRUCTIONS
Follow up visit for history of neuroendocrine tumor.     MRI is unchanged.     Continue lanreotide every 28 days.     Follow up with Dr. Turner in 3 months.

## 2025-04-15 NOTE — PROGRESS NOTES
Patient here for Lanreotide injection, tolerated well. Patient aware of future appointments and denies any questions at this time. Discharged in stable condition.

## 2025-04-15 NOTE — PROGRESS NOTES
Patient ID: Juan Varela is a 74 y.o. male.  Referring Physician: No referring provider defined for this encounter.  Primary Care Provider: Roseanna Durbin DO    Oncology history  #1 metastatic neuroendocrine tumor  Presented with syncope, CAT scan of chest abdomen pelvis did show rectal wall thickening and ill-defined liver lesions  8/20/24 , colonoscopy normal no any abnormal pathology  7/26/24 , MRI of liver,Multiple (approximately 6) bilobar enhancing liver lesions, concerning for metastases.  9/9/24  , liver biopsy, well-differentiated neuroendocrine tumor involving liver, WHO grade 2 , Immunostains show that the tumor cells are positive for CKAE1/3, synaptophysin, chromogranin and INSM1, while negative for GATA3, NKX3.1, TTF-1, CK7, and CK20. The immunoprofile is consistent with a neuroendocrine neoplasm.   The ki-67 index is 3.6%   10/03/24 , NETSPOT 1.  Multiple Dotatate avid hypodense hepatic masses compatible with metastatic neuroendocrine malignancy. 2. An enlarged Dotatate avid small-bowel mesenteric lymph node iscompatible with katerina malignancy of neuroendocrine origin.  3. No definite Dotatate avid lesion within the small bowel, pancreas, or elsewhere within the abdomen and pelvis to suggest the site of primary malignancy.  Lanreotide started on 10/31/24  10/17/24 , chromogranin A, 843  1/15/25 , chromogranin A, 455    4/08/25 , MRI of liver multiple hepatic lesion no change in size, no new lesion as compared to PET scan done in October 2024 (dotatate avid hepatic lesion)  Subjective    HPI   Juan Varela is a 74 y.o. male with PMH of afib, DM2, HTN, afib, and hypothyroidism who was   seen in ER 7/7/24 for near syncope. While in ER, he reported abdominal pain. CT A/P noted diffuse rectal wall thickening and multifocal ill-defined liver lesions c/f metastasis. He was discharged with follow up recommendations to oncology and GI.    GI evaluation done, 8/20/24 , colonoscopy normal no any abnormal  pathology  7/26/24 , MRI of liver,Multiple (approximately 6) bilobar enhancing liver lesions,  concerning for metastases.    Patient is doing very well patient denies any headache no dizziness no nausea vomiting, no chest pain, no shortness of breath, nonspecific abdominal pain which happened couple of time, no diarrhea and constipation no rectal bleed something patient loose bowel movement, no dysuria and hematuria, history of weight loss now body weight is stable     He was hospitalized in June 2024 for afib, hypoglycemia, and near syncope. He is currently on sotolol. His most recent EKG 7/7/24 showed NSR. Qtc has returned to normal.       Interval history  4/15/25  CAT scan of chest abdominal pelvis did show multiple hepatic lesion which were confirmed by MRI.  Liver biopsy done pathology positive for well-differentiated neuroendocrine tumor.     No chest pain no shortness of breath, no skin rash, no diarrhea, no hot flashes patient is asymptomatic.  NETSPOT, positive for hepatic lesions and small mesenteric lymphadenopathy, no abnormal activity seen in the small intestine and colon, lungs, rectum.  Lanreotide was started on October 31, 2024 which is well-tolerated.  No nausea vomiting no abdominal pain patient has 1 bowel movement every day  MRI result discussed with patient, hepatic lesions are stable, chromogranin level is decreasing  Review of Systems:  Review of Systems   Constitutional:  Negative for chills and fever.   HENT:  Negative for sore throat and trouble swallowing.    Respiratory:  Negative for cough and shortness of breath.    Cardiovascular:  Negative for chest pain and palpitations.   Gastrointestinal:       Endocrine: Negative for cold intolerance and heat intolerance.   Genitourinary:  Negative for difficulty urinating.   Musculoskeletal:  Negative for arthralgias and joint swelling.   Skin:  Negative for rash and wound.   Neurological:  Negative for dizziness and weakness.   Hematological:   Negative for adenopathy. Does not bruise/bleed easily.   Psychiatric/Behavioral:  Negative for confusion.          Medications:          Prior to Admission medications    Medication Sig Start Date End Date Taking? Authorizing Provider   atorvastatin (Lipitor) 40 mg tablet TAKE 1 TABLET BY MOUTH EVERYDAY AT BEDTIME 3/8/24     Genesis Mancuso MD   empagliflozin (Jardiance) 25 mg Take 1 tablet (25 mg) by mouth once daily. 6/4/24 12/1/24   Genesis Mancuso MD   insulin glargine (Lantus) 100 unit/mL injection Inject 40 Units under the skin once daily in the morning. Take before meals. Take as directed per insulin instructions. Do not fill before June 29, 2024. 6/29/24     Rd Esquivel,    levothyroxine (Synthroid, Levoxyl) 150 mcg tablet TAKE 1 TABLET BY MOUTH EVERY DAY AS DIRECTED 5/24/24     Genesis Mancuso MD   lisinopril 2.5 mg tablet TAKE 1 TABLET BY MOUTH ONCE DAILY. 4/26/24     Genesis Mancuso MD   pioglitazone (Actos) 45 mg tablet TAKE 1 TABLET (45 MG) BY MOUTH ONCE DAILY 5/24/24 11/20/24   Genesis Mancuso MD   sotalol (Betapace) 120 mg tablet TAKE 1/2 TABLET (60 MG) BY MOUTH 2 TIMES A DAY. 3/6/24     Chet Ley PA-C         Allergies:  Patient has no known allergies.     Past Medical History:  He has a past medical history of Atrial fibrillation (Multi), Diabetes mellitus (Multi), and Hypertension.     Past Surgical History:  He has a past surgical history that includes Other surgical history (09/02/2021).     Social History:  He reports that he has never smoked. He has never been exposed to tobacco smoke. He has never used smokeless tobacco. He reports that he does not drink alcohol and does not use drugs.        Review of Systems - Oncology   Review of Systems:  Review of Systems   Constitutional:  Negative for chills and fever.   HENT:  Negative for sore throat and trouble swallowing.    Respiratory:  Negative for cough and shortness of  breath.    Cardiovascular:  Negative for chest pain and palpitations.   Gastrointestinal:       Endocrine: Negative for cold intolerance and heat intolerance.   Genitourinary:  Negative for difficulty urinating.   Musculoskeletal:  Negative for arthralgias and joint swelling.   Skin:  Negative for rash and wound.   Neurological:  Negative for dizziness and weakness.   Hematological:  Negative for adenopathy. Does not bruise/bleed easily.   Psychiatric/Behavioral:  Negative for confusion.      Medications:          Prior to Admission medications    Medication Sig Start Date End Date Taking? Authorizing Provider   atorvastatin (Lipitor) 40 mg tablet TAKE 1 TABLET BY MOUTH EVERYDAY AT BEDTIME 3/8/24     Genesis Mancuso MD   empagliflozin (Jardiance) 25 mg Take 1 tablet (25 mg) by mouth once daily. 6/4/24 12/1/24   Genesis Mancuso MD   insulin glargine (Lantus) 100 unit/mL injection Inject 40 Units under the skin once daily in the morning. Take before meals. Take as directed per insulin instructions. Do not fill before June 29, 2024. 6/29/24     Rd Esquivel DO   levothyroxine (Synthroid, Levoxyl) 150 mcg tablet TAKE 1 TABLET BY MOUTH EVERY DAY AS DIRECTED 5/24/24     Genesis Mancuso MD   lisinopril 2.5 mg tablet TAKE 1 TABLET BY MOUTH ONCE DAILY. 4/26/24     Genesis Mancuso MD   pioglitazone (Actos) 45 mg tablet TAKE 1 TABLET (45 MG) BY MOUTH ONCE DAILY 5/24/24 11/20/24   Genesis Mancuso MD   sotalol (Betapace) 120 mg tablet TAKE 1/2 TABLET (60 MG) BY MOUTH 2 TIMES A DAY. 3/6/24     Chet Ley PA-C      Allergies:  Patient has no known allergies.      Family History   Family history unknown: Yes     Juan Varela  reports that he has never smoked. He has never been exposed to tobacco smoke. He has never used smokeless tobacco.  He  reports no history of alcohol use.  He  reports no history of drug use.    Objective   BSA: 2.32 meters squared  BP  "157/77 (BP Location: Left arm, Patient Position: Sitting)   Pulse 79   Temp 36.7 °C (98.1 °F) (Temporal)   Resp 16   Ht (S) 1.887 m (6' 2.29\")   Wt 103 kg (227 lb 15.3 oz)   SpO2 98%   BMI 29.04 kg/m²     Physical Exam  Vitals are stable    HEENT examination normal limit    Neck is supple, no carotid bruit, no thyromegaly no cervical lymphadenopathy    Lungs clear on the both side no wheezing    Heart with normal regular rhythm    Abdomen is soft, nontender no hepatosplenomegaly, no any other masses noted, normotonic bowel sound    Extremity patient with trace edema left lower extremity    Neuro examination nonfocal    Lymphatic no peripheral lymphadenopathy    Skin examination did show dry skin no rash no pigmented lesion    Labs       0 Result Notes       Component  Ref Range & Units 3 mo ago 6 mo ago   CHROMOGRANIN A,LC/MS/MS  ADULTS: <311 ng/mL 455 High  843 High  CM   Comment:          Radiology report  CT abdominal pelvis,Multifocal ill-defined lesions within the liver. Findings are  concerning for malignancy/metastasis. Dedicated contrast-enhanced  liver MRI is suggested for further evaluation.      Diffuse rectal wall thickening noted which could be related to  proctitis however correlation for neoplasia is suggested. Inspissated  stool within the rectum with mild inflammatory changes of the distal  colon noted. Correlation for proctocolitis also recommended    MRI of liver,Multiple (approximately 6) bilobar enhancing liver lesions,  concerning for metastases.   4/08/25 , MRI of liver,All of the Dotatate avid liver lesions noted on PET-CT 3 October 2024  (the most recent comparison exam of any kind) are known previously  existing metastases based on MRI 26 July 2024 (the most recent MRI  for comparison)      Comparing MRI with MRI, today's exam shows no interval change in size  of any of the enhancing hepatic metastases  Pathology  FINAL DIAGNOSIS   A.  Liver, mass, biopsy:  -Well-differentiated " neuroendocrine tumor involving the liver, WHO grade 2 (see comment).     Comment:  Immunostains show that the tumor cells are positive for CKAE1/3, synaptophysin, chromogranin and INSM1, while negative for GATA3, NKX3.1, TTF-1, CK7, and CK20.  The immunoprofile is consistent with a neuroendocrine neoplasm. The ki-67 index is 3.6%   NETSPOT  1.  Multiple Dotatate avid hypodense hepatic masses compatible with  metastatic neuroendocrine malignancy.  2. An enlarged Dotatate avid small-bowel mesenteric lymph node is  compatible with katerina malignancy of neuroendocrine origin.  3. No definite Dotatate avid lesion within the small bowel, pancreas,  or elsewhere within the abdomen and pelvis to suggest the site of  primary malignancy.  Assessment/Plan      ##1 metastatic neuroendocrine tumor  Presented with syncope, CAT scan of chest abdomen pelvis did show rectal wall thickening and ill-defined liver lesions  8/20/24 , colonoscopy normal no any abnormal pathology  7/26/24 , MRI of liver,Multiple (approximately 6) bilobar enhancing liver lesions, concerning for metastases.  9/9/24 , liver biopsy, well-differentiated neuroendocrine tumor involving liver, WHO grade 2       lanreotide every 28 on 10/31/24  10/17/24 , chromogranin A, 843  1/15/25 , chromogranin A, 455    4/08/25 , MRI of liver multiple hepatic lesion no change in size, no new lesion as compared to PET scan done in October 2024 (dotatate avid hepatic lesion)    Plan , 3415/25  Patient not interested for redo ablative therapy.  Will continue lanreotide, monitor chromogranin level.  MRI result discussed with the patient patient has stable hepatic lesion no evidence of new hepatic lesion.    Continue same treatment reevaluation after 3-month    time spent 30 minutes  Juan David Turner MD

## 2025-04-20 DIAGNOSIS — E78.49 OTHER HYPERLIPIDEMIA: Chronic | ICD-10-CM

## 2025-04-20 DIAGNOSIS — R80.9 MICROALBUMINURIA: ICD-10-CM

## 2025-04-21 DIAGNOSIS — E78.49 OTHER HYPERLIPIDEMIA: Chronic | ICD-10-CM

## 2025-04-21 RX ORDER — LISINOPRIL 2.5 MG/1
2.5 TABLET ORAL DAILY
Qty: 90 TABLET | Refills: 1 | Status: SHIPPED | OUTPATIENT
Start: 2025-04-21

## 2025-04-21 RX ORDER — SOTALOL HYDROCHLORIDE 120 MG/1
60 TABLET ORAL 2 TIMES DAILY
Qty: 90 TABLET | Refills: 2 | OUTPATIENT
Start: 2025-04-21

## 2025-04-22 DIAGNOSIS — E78.49 OTHER HYPERLIPIDEMIA: Chronic | ICD-10-CM

## 2025-04-22 RX ORDER — SOTALOL HYDROCHLORIDE 120 MG/1
120 TABLET ORAL EVERY 12 HOURS
Qty: 90 TABLET | Refills: 2 | Status: SHIPPED | OUTPATIENT
Start: 2025-04-22

## 2025-04-28 LAB — CHROMOGRANIN A, LC/MS/MS: 367 NG/ML

## 2025-04-28 RX ORDER — SOTALOL HYDROCHLORIDE 120 MG/1
TABLET ORAL
Qty: 90 TABLET | Refills: 2 | OUTPATIENT
Start: 2025-04-28

## 2025-05-13 ENCOUNTER — INFUSION (OUTPATIENT)
Dept: HEMATOLOGY/ONCOLOGY | Facility: CLINIC | Age: 75
End: 2025-05-13
Payer: MEDICARE

## 2025-05-13 VITALS
RESPIRATION RATE: 16 BRPM | DIASTOLIC BLOOD PRESSURE: 73 MMHG | OXYGEN SATURATION: 98 % | BODY MASS INDEX: 29.21 KG/M2 | WEIGHT: 227.6 LBS | TEMPERATURE: 97.5 F | HEIGHT: 74 IN | HEART RATE: 68 BPM | SYSTOLIC BLOOD PRESSURE: 132 MMHG

## 2025-05-13 DIAGNOSIS — N40.0 PROSTATE ENLARGEMENT: ICD-10-CM

## 2025-05-13 DIAGNOSIS — R16.0 LIVER MASS: ICD-10-CM

## 2025-05-13 DIAGNOSIS — D3A.8 NEUROENDOCRINE TUMOR: ICD-10-CM

## 2025-05-13 DIAGNOSIS — K76.9 LIVER LESION: ICD-10-CM

## 2025-05-13 LAB
ALBUMIN SERPL BCP-MCNC: 3.9 G/DL (ref 3.4–5)
ALP SERPL-CCNC: 86 U/L (ref 33–136)
ALT SERPL W P-5'-P-CCNC: 22 U/L (ref 10–52)
ANION GAP SERPL CALC-SCNC: 11 MMOL/L (ref 10–20)
AST SERPL W P-5'-P-CCNC: 19 U/L (ref 9–39)
BASOPHILS # BLD AUTO: 0.04 X10*3/UL (ref 0–0.1)
BASOPHILS NFR BLD AUTO: 0.6 %
BILIRUB SERPL-MCNC: 0.5 MG/DL (ref 0–1.2)
BUN SERPL-MCNC: 19 MG/DL (ref 6–23)
CALCIUM SERPL-MCNC: 8.5 MG/DL (ref 8.6–10.3)
CHLORIDE SERPL-SCNC: 102 MMOL/L (ref 98–107)
CO2 SERPL-SCNC: 26 MMOL/L (ref 21–32)
CREAT SERPL-MCNC: 1.38 MG/DL (ref 0.5–1.3)
EGFRCR SERPLBLD CKD-EPI 2021: 53 ML/MIN/1.73M*2
EOSINOPHIL # BLD AUTO: 0.2 X10*3/UL (ref 0–0.4)
EOSINOPHIL NFR BLD AUTO: 2.9 %
ERYTHROCYTE [DISTWIDTH] IN BLOOD BY AUTOMATED COUNT: 13.7 % (ref 11.5–14.5)
GLUCOSE SERPL-MCNC: 425 MG/DL (ref 74–99)
HCT VFR BLD AUTO: 34.5 % (ref 41–52)
HGB BLD-MCNC: 10.9 G/DL (ref 13.5–17.5)
IMM GRANULOCYTES # BLD AUTO: 0.02 X10*3/UL (ref 0–0.5)
IMM GRANULOCYTES NFR BLD AUTO: 0.3 % (ref 0–0.9)
LYMPHOCYTES # BLD AUTO: 1.28 X10*3/UL (ref 0.8–3)
LYMPHOCYTES NFR BLD AUTO: 18.9 %
MCH RBC QN AUTO: 25.3 PG (ref 26–34)
MCHC RBC AUTO-ENTMCNC: 31.6 G/DL (ref 32–36)
MCV RBC AUTO: 80 FL (ref 80–100)
MONOCYTES # BLD AUTO: 0.53 X10*3/UL (ref 0.05–0.8)
MONOCYTES NFR BLD AUTO: 7.8 %
NEUTROPHILS # BLD AUTO: 4.72 X10*3/UL (ref 1.6–5.5)
NEUTROPHILS NFR BLD AUTO: 69.5 %
PLATELET # BLD AUTO: 164 X10*3/UL (ref 150–450)
POTASSIUM SERPL-SCNC: 4.5 MMOL/L (ref 3.5–5.3)
PROT SERPL-MCNC: 6.3 G/DL (ref 6.4–8.2)
RBC # BLD AUTO: 4.31 X10*6/UL (ref 4.5–5.9)
SODIUM SERPL-SCNC: 134 MMOL/L (ref 136–145)
WBC # BLD AUTO: 6.8 X10*3/UL (ref 4.4–11.3)

## 2025-05-13 PROCEDURE — 2500000004 HC RX 250 GENERAL PHARMACY W/ HCPCS (ALT 636 FOR OP/ED): Mod: JZ,TB | Performed by: INTERNAL MEDICINE

## 2025-05-13 PROCEDURE — 85025 COMPLETE CBC W/AUTO DIFF WBC: CPT

## 2025-05-13 PROCEDURE — 96372 THER/PROPH/DIAG INJ SC/IM: CPT

## 2025-05-13 PROCEDURE — 80053 COMPREHEN METABOLIC PANEL: CPT

## 2025-05-13 PROCEDURE — 36415 COLL VENOUS BLD VENIPUNCTURE: CPT

## 2025-05-13 RX ORDER — LANREOTIDE ACETATE 120 MG/.5ML
120 INJECTION SUBCUTANEOUS ONCE
OUTPATIENT
Start: 2025-06-10

## 2025-05-13 RX ORDER — DIPHENHYDRAMINE HYDROCHLORIDE 50 MG/ML
50 INJECTION, SOLUTION INTRAMUSCULAR; INTRAVENOUS AS NEEDED
OUTPATIENT
Start: 2025-06-10

## 2025-05-13 RX ORDER — LANREOTIDE ACETATE 120 MG/.5ML
120 INJECTION SUBCUTANEOUS ONCE
Status: COMPLETED | OUTPATIENT
Start: 2025-05-13 | End: 2025-05-13

## 2025-05-13 RX ORDER — EPINEPHRINE 0.3 MG/.3ML
0.3 INJECTION SUBCUTANEOUS EVERY 5 MIN PRN
OUTPATIENT
Start: 2025-06-10

## 2025-05-13 RX ORDER — FAMOTIDINE 10 MG/ML
20 INJECTION, SOLUTION INTRAVENOUS ONCE AS NEEDED
OUTPATIENT
Start: 2025-06-10

## 2025-05-13 RX ORDER — ALBUTEROL SULFATE 0.83 MG/ML
3 SOLUTION RESPIRATORY (INHALATION) AS NEEDED
OUTPATIENT
Start: 2025-06-10

## 2025-05-13 RX ADMIN — LANREOTIDE ACETATE 120 MG: 120 INJECTION SUBCUTANEOUS at 13:57

## 2025-05-13 ASSESSMENT — PAIN SCALES - GENERAL: PAINLEVEL_OUTOF10: 0-NO PAIN

## 2025-05-13 NOTE — PROGRESS NOTES
Patient here for monthly lanreotide. Patient tolerated well, had labs done prior. AVS given to patient, discharged in stable condition.

## 2025-05-21 DIAGNOSIS — E11.9 CONTROLLED TYPE 2 DIABETES MELLITUS WITHOUT COMPLICATION, WITHOUT LONG-TERM CURRENT USE OF INSULIN: Chronic | ICD-10-CM

## 2025-05-21 DIAGNOSIS — E03.9 HYPOTHYROIDISM, UNSPECIFIED: ICD-10-CM

## 2025-05-21 RX ORDER — LEVOTHYROXINE SODIUM 150 UG/1
150 TABLET ORAL DAILY
Qty: 90 TABLET | Refills: 1 | Status: SHIPPED | OUTPATIENT
Start: 2025-05-21

## 2025-05-21 RX ORDER — PIOGLITAZONE 45 MG/1
45 TABLET ORAL DAILY
Qty: 90 TABLET | Refills: 1 | Status: SHIPPED | OUTPATIENT
Start: 2025-05-21 | End: 2025-11-17

## 2025-06-04 LAB
ALBUMIN SERPL-MCNC: 4.2 G/DL (ref 3.6–5.1)
ALBUMIN/CREAT UR: 10 MG/G CREAT
ALP SERPL-CCNC: 84 U/L (ref 35–144)
ALT SERPL-CCNC: 23 U/L (ref 9–46)
ANION GAP SERPL CALCULATED.4IONS-SCNC: 8 MMOL/L (CALC) (ref 7–17)
AST SERPL-CCNC: 16 U/L (ref 10–35)
BILIRUB SERPL-MCNC: 0.7 MG/DL (ref 0.2–1.2)
BUN SERPL-MCNC: 24 MG/DL (ref 7–25)
CALCIUM SERPL-MCNC: 9.1 MG/DL (ref 8.6–10.3)
CHLORIDE SERPL-SCNC: 103 MMOL/L (ref 98–110)
CO2 SERPL-SCNC: 27 MMOL/L (ref 20–32)
CREAT SERPL-MCNC: 1.22 MG/DL (ref 0.7–1.28)
CREAT UR-MCNC: 61 MG/DL (ref 20–320)
EGFRCR SERPLBLD CKD-EPI 2021: 61 ML/MIN/1.73M2
EST. AVERAGE GLUCOSE BLD GHB EST-MCNC: 301 MG/DL
EST. AVERAGE GLUCOSE BLD GHB EST-SCNC: 16.6 MMOL/L
GLUCOSE SERPL-MCNC: 261 MG/DL (ref 65–99)
HBA1C MFR BLD: 12.1 %
MICROALBUMIN UR-MCNC: 0.6 MG/DL
POTASSIUM SERPL-SCNC: 4.2 MMOL/L (ref 3.5–5.3)
PROT SERPL-MCNC: 6.5 G/DL (ref 6.1–8.1)
PSA SERPL-MCNC: 5.56 NG/ML
SODIUM SERPL-SCNC: 138 MMOL/L (ref 135–146)
TSH SERPL-ACNC: 3.16 MIU/L (ref 0.4–4.5)

## 2025-06-04 NOTE — PROGRESS NOTES
"Subjective   Juan Varela is a 75 y.o. male who presents for follow-up of Type 2 diabetes mellitus.     He was found to have metastatic neuroendocrine tumor, for which he is on Lanreotide.    Known complications due to diabetes included none.      Cardiovascular risk factors include advanced age (older than 55 for men, 65 for women), diabetes mellitus, male gender, and microalbuminuria. The patient is on an ACE inhibitor or angiotensin II receptor blocker.  The patient has not been previously hospitalized due to diabetic ketoacidosis.     Current symptoms/problems include none. His clinical course has been stable.     The patient is not currently checking the blood glucose.      Hypoglycemia frequency: Denies  Symptoms of hypoglycemia: N/A      Current Medication Regimen  Lantus 40 units SQ bedtime   Pioglitazone 45mg once daily   Jardiance 25mg once daily      MEALS:  increased carbs and calories   Breakfast - cereal, oatmeal - rice kirspies, raison bran, frosted flakes  Lunch - tuna sandwich with white bread, pizza   Dinner - soup, steak and onions   Beverages - Propel, diet iced tea      Denies exercise regimen - cuts grass and works outsidde     Review of Systems   Constitutional:  Positive for appetite change.   Eyes:  Negative for visual disturbance.   Cardiovascular:  Positive for leg swelling.   Endocrine: Negative for polydipsia.   Genitourinary:         Nocturia x 1    All other systems reviewed and are negative.      Objective   /64 (BP Location: Left arm, Patient Position: Sitting, BP Cuff Size: Adult)   Pulse 69   Ht 1.93 m (6' 4\")   Wt 103 kg (227 lb)   BMI 27.63 kg/m²   Physical Exam  Vitals and nursing note reviewed.   Constitutional:       General: He is not in acute distress.     Appearance: Normal appearance. He is normal weight.   HENT:      Head: Normocephalic and atraumatic.      Nose: Nose normal.      Mouth/Throat:      Mouth: Mucous membranes are moist.   Eyes:      Extraocular " Movements: Extraocular movements intact.   Pulmonary:      Effort: Pulmonary effort is normal.   Musculoskeletal:         General: Normal range of motion.   Neurological:      Mental Status: He is alert and oriented to person, place, and time.   Psychiatric:         Mood and Affect: Mood normal.         Lab Review  GLUCOSE (mg/dL)   Date Value   06/03/2025 261 (H)     Glucose (mg/dL)   Date Value   05/13/2025 425 (H)   04/15/2025 289 (H)   01/15/2025 323 (H)     POC HEMOGLOBIN A1c (%)   Date Value   12/04/2023 8.2 (A)     HEMOGLOBIN A1c (%)   Date Value   06/03/2025 12.1 (H)     Hemoglobin A1C (%)   Date Value   05/31/2024 7.9 (H)   06/26/2023 8.3 (A)   06/26/2023 8.2 (A)   08/09/2022 8.2 (A)     CARBON DIOXIDE (mmol/L)   Date Value   06/03/2025 27     Bicarbonate (mmol/L)   Date Value   05/13/2025 26   04/15/2025 26   01/15/2025 27     UREA NITROGEN (BUN) (mg/dL)   Date Value   06/03/2025 24     Urea Nitrogen (mg/dL)   Date Value   05/13/2025 19   04/15/2025 21   01/15/2025 23     Creatinine (mg/dL)   Date Value   05/13/2025 1.38 (H)   04/15/2025 1.39 (H)   01/15/2025 1.24     CREATININE (mg/dL)   Date Value   06/03/2025 1.22     Lab Results   Component Value Date    CHOL 111 05/31/2024    CHOL 116 06/26/2023    CHOL 115 06/26/2023     Lab Results   Component Value Date    HDL 36.1 05/31/2024    HDL 32.8 (A) 06/26/2023    HDL 32.3 (A) 06/26/2023     Lab Results   Component Value Date    LDLCALC 48 05/31/2024     Lab Results   Component Value Date    TRIG 137 05/31/2024    TRIG 203 (H) 06/26/2023    TRIG 204 (H) 06/26/2023     Lab Results   Component Value Date    TSH 3.16 06/03/2025    M2TKPUJ 8.6 05/31/2024    THYROIDPAB >1000 (A) 01/09/2023       Health Maintenance:   Foot Exam:  June 6, 2023  Eye Exam:  2022  Urine Albumin: [place] 3, 2025    Assessment/Plan     75-year-old male presents for follow up for type 2 diabetes mellitus. His blood pressure is at goal. He is noted to be on a statin.     He has Hashimoto's  thyroiditis.      Type 2 diabetes mellitus (Multi)  To continue Jardiance 25mg once daily   To continue pioglitazone 45mg once daily   To commence Glimepiride 2mg twice daily   To increase Lantus to 44 units SQ bedtime   To obtain blood tests before the next appointment  For a diabetic dilated eye examination  Counseled that the goal A1C should be 7% or less  Counseled glycemic control is warranted to prevent microvascular complications  Hyperglycemia can be as a result of Lanreotide     Long-term insulin use (Multi)  Please rotate insulin injection sites      Hypothyroidism  To continue Levothyroxine 150mcg po daily   Take levothyroxine on an empty stomach with water alone, 30-60 minutes before eating or taking other medications, 4 hours before any calcium or iron supplement    For follow up in 6 months

## 2025-06-04 NOTE — PATIENT INSTRUCTIONS
Thank you for choosing [facility]  for your health care needs.  If you have any questions, concerns or medical needs, please feel free to contact our office at [phone].    Please ensure you complete your blood work one week before the next scheduled appointment.    To continue Jardiance 25mg once daily   To continue pioglitazone 45mg once daily   To commence Glimepiride 2mg twice daily   To increase Lantus to 44 units SQ bedtime   To continue Levothyroxine 150mcg po daily   Take levothyroxine on an empty stomach with water alone, 30-60 minutes before eating or taking other medications, 4 hours before any calcium or iron supplement  To obtain blood tests before the next appointment  For a diabetic dilated eye examination  For follow up in 6 months

## 2025-06-05 ENCOUNTER — APPOINTMENT (OUTPATIENT)
Dept: ENDOCRINOLOGY | Facility: CLINIC | Age: 75
End: 2025-06-05
Payer: MEDICARE

## 2025-06-05 VITALS
BODY MASS INDEX: 27.64 KG/M2 | DIASTOLIC BLOOD PRESSURE: 64 MMHG | HEIGHT: 76 IN | SYSTOLIC BLOOD PRESSURE: 110 MMHG | WEIGHT: 227 LBS | HEART RATE: 69 BPM

## 2025-06-05 DIAGNOSIS — E11.9 TYPE 2 DIABETES MELLITUS WITHOUT COMPLICATION, UNSPECIFIED WHETHER LONG TERM INSULIN USE: Primary | ICD-10-CM

## 2025-06-05 DIAGNOSIS — E11.649 TYPE 2 DIABETES MELLITUS WITH HYPOGLYCEMIA WITHOUT COMA, WITH LONG-TERM CURRENT USE OF INSULIN: Chronic | ICD-10-CM

## 2025-06-05 DIAGNOSIS — Z79.4 LONG-TERM INSULIN USE (MULTI): ICD-10-CM

## 2025-06-05 DIAGNOSIS — E03.9 ACQUIRED HYPOTHYROIDISM: ICD-10-CM

## 2025-06-05 DIAGNOSIS — Z79.4 TYPE 2 DIABETES MELLITUS WITH HYPOGLYCEMIA WITHOUT COMA, WITH LONG-TERM CURRENT USE OF INSULIN: Chronic | ICD-10-CM

## 2025-06-05 LAB — POC FINGERSTICK BLOOD GLUCOSE: 293 MG/DL (ref 70–100)

## 2025-06-05 PROCEDURE — 99214 OFFICE O/P EST MOD 30 MIN: CPT | Performed by: INTERNAL MEDICINE

## 2025-06-05 PROCEDURE — 1160F RVW MEDS BY RX/DR IN RCRD: CPT | Performed by: INTERNAL MEDICINE

## 2025-06-05 PROCEDURE — G2211 COMPLEX E/M VISIT ADD ON: HCPCS | Performed by: INTERNAL MEDICINE

## 2025-06-05 PROCEDURE — 1159F MED LIST DOCD IN RCRD: CPT | Performed by: INTERNAL MEDICINE

## 2025-06-05 PROCEDURE — 82962 GLUCOSE BLOOD TEST: CPT | Performed by: INTERNAL MEDICINE

## 2025-06-05 RX ORDER — INSULIN GLARGINE 100 [IU]/ML
44 INJECTION, SOLUTION SUBCUTANEOUS NIGHTLY
Qty: 30 ML | Refills: 5 | Status: SHIPPED | OUTPATIENT
Start: 2025-06-05 | End: 2025-12-02

## 2025-06-05 RX ORDER — GLIMEPIRIDE 2 MG/1
2 TABLET ORAL
Qty: 180 TABLET | Refills: 1 | Status: SHIPPED | OUTPATIENT
Start: 2025-06-05 | End: 2025-12-02

## 2025-06-05 ASSESSMENT — ENCOUNTER SYMPTOMS
POLYDIPSIA: 0
APPETITE CHANGE: 1

## 2025-06-06 DIAGNOSIS — E78.5 HYPERLIPIDEMIA, UNSPECIFIED: ICD-10-CM

## 2025-06-06 RX ORDER — ATORVASTATIN CALCIUM 40 MG/1
40 TABLET, FILM COATED ORAL NIGHTLY
Qty: 90 TABLET | Refills: 1 | OUTPATIENT
Start: 2025-06-06

## 2025-06-09 RX ORDER — ATORVASTATIN CALCIUM 40 MG/1
40 TABLET, FILM COATED ORAL NIGHTLY
Qty: 90 TABLET | Refills: 1 | Status: SHIPPED | OUTPATIENT
Start: 2025-06-09

## 2025-06-09 NOTE — ASSESSMENT & PLAN NOTE
To continue Jardiance 25mg once daily   To continue pioglitazone 45mg once daily   To commence Glimepiride 2mg twice daily   To increase Lantus to 44 units SQ bedtime   To obtain blood tests before the next appointment  For a diabetic dilated eye examination  Counseled that the goal A1C should be 7% or less  Counseled glycemic control is warranted to prevent microvascular complications  Hyperglycemia can be as a result of Lanreotide

## 2025-06-09 NOTE — TELEPHONE ENCOUNTER
(Last seen 6/5/2025)    Patient need 90 day supply of Atorvastin sent to [facility] pharmacy.

## 2025-06-10 ENCOUNTER — INFUSION (OUTPATIENT)
Dept: HEMATOLOGY/ONCOLOGY | Facility: CLINIC | Age: 75
End: 2025-06-10
Payer: MEDICARE

## 2025-06-10 VITALS
TEMPERATURE: 97.2 F | HEIGHT: 74 IN | RESPIRATION RATE: 16 BRPM | DIASTOLIC BLOOD PRESSURE: 77 MMHG | SYSTOLIC BLOOD PRESSURE: 126 MMHG | WEIGHT: 227.7 LBS | HEART RATE: 65 BPM | BODY MASS INDEX: 29.22 KG/M2 | OXYGEN SATURATION: 100 %

## 2025-06-10 DIAGNOSIS — D3A.8 NEUROENDOCRINE TUMOR: ICD-10-CM

## 2025-06-10 PROCEDURE — 96372 THER/PROPH/DIAG INJ SC/IM: CPT

## 2025-06-10 PROCEDURE — 2500000004 HC RX 250 GENERAL PHARMACY W/ HCPCS (ALT 636 FOR OP/ED): Mod: JZ,TB | Performed by: INTERNAL MEDICINE

## 2025-06-10 RX ORDER — EPINEPHRINE 0.3 MG/.3ML
0.3 INJECTION SUBCUTANEOUS EVERY 5 MIN PRN
OUTPATIENT
Start: 2025-07-08

## 2025-06-10 RX ORDER — LANREOTIDE ACETATE 120 MG/.5ML
120 INJECTION SUBCUTANEOUS ONCE
OUTPATIENT
Start: 2025-07-08

## 2025-06-10 RX ORDER — DIPHENHYDRAMINE HYDROCHLORIDE 50 MG/ML
50 INJECTION, SOLUTION INTRAMUSCULAR; INTRAVENOUS AS NEEDED
OUTPATIENT
Start: 2025-07-08

## 2025-06-10 RX ORDER — ALBUTEROL SULFATE 0.83 MG/ML
3 SOLUTION RESPIRATORY (INHALATION) AS NEEDED
OUTPATIENT
Start: 2025-07-08

## 2025-06-10 RX ORDER — FAMOTIDINE 10 MG/ML
20 INJECTION, SOLUTION INTRAVENOUS ONCE AS NEEDED
OUTPATIENT
Start: 2025-07-08

## 2025-06-10 RX ORDER — LANREOTIDE ACETATE 120 MG/.5ML
120 INJECTION SUBCUTANEOUS ONCE
Status: COMPLETED | OUTPATIENT
Start: 2025-06-10 | End: 2025-06-10

## 2025-06-10 RX ADMIN — LANREOTIDE ACETATE 120 MG: 120 INJECTION SUBCUTANEOUS at 14:16

## 2025-06-10 ASSESSMENT — PAIN SCALES - GENERAL: PAINLEVEL_OUTOF10: 0-NO PAIN

## 2025-06-10 NOTE — PROGRESS NOTES
Patient here for monthly lanreotide. Patient tolerated well, aware to return in one month for injection and follow up visit. AVS given to patient, discharged in stable condition.

## 2025-06-18 DIAGNOSIS — E78.49 OTHER HYPERLIPIDEMIA: Chronic | ICD-10-CM

## 2025-06-19 RX ORDER — SOTALOL HYDROCHLORIDE 120 MG/1
60 TABLET ORAL 2 TIMES DAILY
Qty: 90 TABLET | Refills: 0 | Status: SHIPPED | OUTPATIENT
Start: 2025-06-19

## 2025-06-24 ENCOUNTER — TELEPHONE (OUTPATIENT)
Dept: CARDIOLOGY | Facility: HOSPITAL | Age: 75
End: 2025-06-24

## 2025-06-24 ENCOUNTER — APPOINTMENT (OUTPATIENT)
Dept: CARDIOLOGY | Facility: CLINIC | Age: 75
End: 2025-06-24
Payer: MEDICARE

## 2025-06-24 DIAGNOSIS — I48.0 AF (PAROXYSMAL ATRIAL FIBRILLATION) (MULTI): Chronic | ICD-10-CM

## 2025-06-24 NOTE — PROGRESS NOTES
Counseling:  The patient was counseled regarding diagnostic results, instructions for management, risk factor reductions, prognosis, patient and family education, impressions, risks and benefits of treatment options and importance of compliance with treatment.      Chief Complaint:   The patient presents today for annual followup of a-fib.     History Of Present Illness:    Juan Varela is a 75 year old male patient who presents today for annual followup of a-fib. His PMH is significant for atrial fibrillation, DM type 2, hyperlipidemia and hypothyroidism.      Last Recorded Vitals:  There were no vitals filed for this visit.    Past Surgical History:  He has a past surgical history that includes Other surgical history (09/02/2021).      Social History:  He reports that he has never smoked. He has never been exposed to tobacco smoke. He has never used smokeless tobacco. He reports that he does not drink alcohol and does not use drugs.    Family History:  Family History   Family history unknown: Yes        Allergies:  Patient has no known allergies.    Outpatient Medications:  Current Outpatient Medications   Medication Instructions    atorvastatin (LIPITOR) 40 mg, oral, Nightly    empagliflozin (JARDIANCE) 25 mg, oral, Daily    glimepiride (AMARYL) 2 mg, oral, 2 times daily before meals    Lantus U-100 Insulin 44 Units, subcutaneous, Nightly    levothyroxine (SYNTHROID, LEVOXYL) 150 mcg, oral, Daily, as directed    lisinopril 2.5 mg, oral, Daily    pioglitazone (ACTOS) 45 mg, oral, Daily    sotalol (BETAPACE) 60 mg, oral, 2 times daily     Review of Systems   All other systems reviewed and are negative.     Physical Exam:  Constitutional:       Appearance: Healthy appearance. Not in distress.   Neck:      Vascular: No JVR. JVD normal.   Pulmonary:      Effort: Pulmonary effort is normal.      Breath sounds: Normal breath sounds. No wheezing. No rhonchi. No rales.   Chest:      Chest wall: Not tender to  palpatation.   Cardiovascular:      PMI at left midclavicular line. Normal rate. Regular rhythm. Normal S1. Normal S2.       Murmurs: There is no murmur.      No gallop.  No click. No rub.   Pulses:     Intact distal pulses.   Edema:     Peripheral edema absent.   Abdominal:      General: Bowel sounds are normal.      Palpations: Abdomen is soft.      Tenderness: There is no abdominal tenderness.   Musculoskeletal: Normal range of motion.         General: No tenderness. Skin:     General: Skin is warm and dry.   Neurological:      General: No focal deficit present.      Mental Status: Alert and oriented to person, place and time.          Last Labs:  CBC -  Lab Results   Component Value Date    WBC 6.8 05/13/2025    HGB 10.9 (L) 05/13/2025    HCT 34.5 (L) 05/13/2025    MCV 80 05/13/2025     05/13/2025       CMP -  Lab Results   Component Value Date    CALCIUM 9.1 06/03/2025    PHOS 2.9 06/27/2024    PROT 6.5 06/03/2025    ALBUMIN 4.2 06/03/2025    AST 16 06/03/2025    ALT 23 06/03/2025    ALKPHOS 84 06/03/2025    BILITOT 0.7 06/03/2025       LIPID PANEL -   Lab Results   Component Value Date    CHOL 111 05/31/2024    TRIG 137 05/31/2024    HDL 36.1 05/31/2024    CHHDL 3.1 05/31/2024    LDLF 43 06/26/2023    VLDL 27 05/31/2024    NHDL 75 05/31/2024       RENAL FUNCTION PANEL -   Lab Results   Component Value Date    GLUCOSE 261 (H) 06/03/2025     06/03/2025    K 4.2 06/03/2025     06/03/2025    CO2 27 06/03/2025    ANIONGAP 8 06/03/2025    BUN 24 06/03/2025    CREATININE 1.22 06/03/2025    GFRMALE 77 06/26/2023    CALCIUM 9.1 06/03/2025    PHOS 2.9 06/27/2024    ALBUMIN 4.2 06/03/2025        Lab Results   Component Value Date    BNP 51 07/07/2024    HGBA1C 12.1 (H) 06/03/2025    HGBA1C 8.2 (A) 12/04/2023       Last Cardiology Tests:  08/30/2022 - TTE  1. Left ventricular systolic function is normal.  2. Spectral Doppler shows an impaired relaxation pattern of left ventricular diastolic filling.  3.  "Moderately enlarged right ventricle.  4. There is low normal right ventricular systolic function.     08/30/2022 - Vascular Lab Carotid Artery Duplex U/S   1. Right Carotid: Findings are consistent with less than 50% stenosis of the right proximal ICA. Laminar flow seen by color Doppler. Right external carotid artery appears patent with no evidence of stenosis. No evidence of hemodynamically significant stenosis of the right common carotid artery. The right vertebral artery is patent with antegrade flow. No evidence of hemodynamically significant stenosis in the right subclavian.  2. Left Carotid: Findings are consistent with less than 50% stenosis of the left proximal ICA. Laminar flow seen by color Doppler. Left external carotid artery appears patent with no evidence of stenosis. No evidence of hemodynamically significant stenosis of the left common carotid artery. The left vertebral artery is patent with antegrade flow. No evidence of hemodynamically significant stenosis in the left subclavian.    Lab review: I have personally reviewed the laboratory result(s).    Assessment/Plan   1) Dizziness and Lightheadedness  Negative Carotid Doppler  Holter negative for pauses  Started on lisinopril by endocrinologist for management of dizziness and balance issues with \"90-95%\" improvement in symptoms.     2) Paroxysmal A-Fib  On Sotalol 120 mg daily  QTc ok  Echocardiogram negative  Saw Dr. Marsh 09/26/2022 who recommended ILR - insurance would not cover procedure       Scribe Attestation  By signing my name below, I, Dilan Maloney, attest that this documentation has been prepared under the direction and in the presence of Rick Reynolds MD.   "

## 2025-06-24 NOTE — TELEPHONE ENCOUNTER
Pt reported for annual follow up today with Dr. Reynolds scheduled for 2:45pm. Pt opted to r/s d/t wait time, per Shira we are adding pt to [facility] schedule tomorrow at 10:30am so pt may address some medication questions and annual follow up to be scheduled with Dr. Reynolds at a later date. Pt agreeable to see [facility] MONTGOM[facility] 6/25 10:30am.

## 2025-06-25 ENCOUNTER — OFFICE VISIT (OUTPATIENT)
Dept: CARDIOLOGY | Facility: HOSPITAL | Age: 75
End: 2025-06-25
Payer: MEDICARE

## 2025-06-25 VITALS
WEIGHT: 229.6 LBS | HEART RATE: 65 BPM | SYSTOLIC BLOOD PRESSURE: 146 MMHG | HEIGHT: 74 IN | BODY MASS INDEX: 29.46 KG/M2 | DIASTOLIC BLOOD PRESSURE: 84 MMHG

## 2025-06-25 DIAGNOSIS — I10 HYPERTENSION, UNSPECIFIED TYPE: ICD-10-CM

## 2025-06-25 DIAGNOSIS — I48.0 AF (PAROXYSMAL ATRIAL FIBRILLATION) (MULTI): Primary | Chronic | ICD-10-CM

## 2025-06-25 DIAGNOSIS — E78.49 OTHER HYPERLIPIDEMIA: Chronic | ICD-10-CM

## 2025-06-25 LAB
ATRIAL RATE: 65 BPM
P AXIS: 54 DEGREES
P OFFSET: 183 MS
P ONSET: 123 MS
PR INTERVAL: 178 MS
Q ONSET: 212 MS
QRS COUNT: 10 BEATS
QRS DURATION: 86 MS
QT INTERVAL: 432 MS
QTC CALCULATION(BAZETT): 449 MS
QTC FREDERICIA: 443 MS
R AXIS: -30 DEGREES
T AXIS: 26 DEGREES
T OFFSET: 428 MS
VENTRICULAR RATE: 65 BPM

## 2025-06-25 PROCEDURE — 1036F TOBACCO NON-USER: CPT | Performed by: NURSE PRACTITIONER

## 2025-06-25 PROCEDURE — 99213 OFFICE O/P EST LOW 20 MIN: CPT | Performed by: NURSE PRACTITIONER

## 2025-06-25 PROCEDURE — 3079F DIAST BP 80-89 MM HG: CPT | Performed by: NURSE PRACTITIONER

## 2025-06-25 PROCEDURE — 99214 OFFICE O/P EST MOD 30 MIN: CPT | Performed by: NURSE PRACTITIONER

## 2025-06-25 PROCEDURE — 1160F RVW MEDS BY RX/DR IN RCRD: CPT | Performed by: NURSE PRACTITIONER

## 2025-06-25 PROCEDURE — 93005 ELECTROCARDIOGRAM TRACING: CPT | Performed by: NURSE PRACTITIONER

## 2025-06-25 PROCEDURE — 3077F SYST BP >= 140 MM HG: CPT | Performed by: NURSE PRACTITIONER

## 2025-06-25 PROCEDURE — 1159F MED LIST DOCD IN RCRD: CPT | Performed by: NURSE PRACTITIONER

## 2025-06-25 RX ORDER — SOTALOL HYDROCHLORIDE 120 MG/1
60 TABLET ORAL 2 TIMES DAILY
Qty: 90 TABLET | Refills: 2 | Status: SHIPPED | OUTPATIENT
Start: 2025-06-25 | End: 2026-03-22

## 2025-06-25 ASSESSMENT — ENCOUNTER SYMPTOMS
CHILLS: 0
ALTERED MENTAL STATUS: 0
HEMATURIA: 0
NAUSEA: 0
SYNCOPE: 0
DYSPNEA ON EXERTION: 0
BLOATING: 0
SHORTNESS OF BREATH: 0
WEIGHT GAIN: 1
ORTHOPNEA: 0
WHEEZING: 0
PALPITATIONS: 0
IRREGULAR HEARTBEAT: 0
DIZZINESS: 1
NEAR-SYNCOPE: 0
HEADACHES: 1
HEMATOCHEZIA: 0
DIARRHEA: 0
CONSTIPATION: 0
FEVER: 0
COUGH: 0
VOMITING: 0

## 2025-06-25 NOTE — PROGRESS NOTES
Chief Complaint/Reason for Visit:  No chief complaint on file. 1 year cardiovascular follow up    History Of Present Illness:    Juan Varela is a 75 y.o. male that presents to the office for 1 year follow up.    Taking medications as prescribed.     PMH significant for atrial fibrillation, DM type 2, hyperlipidemia and hypothyroidism.     He reports that he can tell when he is in AF and last episode was about 1 year ago.     He lives with his son and grandson.    EKG personally reviewed today showed SR with HR 65 bpm.  This will go to the cardiologist for final review.     Past Medical History:  He has a past medical history of Atrial fibrillation (Multi), Diabetes mellitus (Multi), and Hypertension.    Past Surgical History:  He has a past surgical history that includes Other surgical history (09/02/2021).      Social History:  He reports that he has never smoked. He has never been exposed to tobacco smoke. He has never used smokeless tobacco. He reports that he does not drink alcohol and does not use drugs.    Family History:  Family History[1]     Allergies:  Patient has no known allergies.    Review of Systems   Constitutional: Positive for weight gain (a couple lbs). Negative for chills and fever.   HENT:          +seasonal allergies with runny nose/eyes   Cardiovascular:  Negative for chest pain, dyspnea on exertion, irregular heartbeat, leg swelling, near-syncope, orthopnea, palpitations and syncope.   Respiratory:  Negative for cough, shortness of breath and wheezing.    Gastrointestinal:  Negative for bloating, constipation, diarrhea, hematochezia, melena, nausea and vomiting.   Genitourinary:  Negative for hematuria.   Neurological:  Positive for dizziness (one time episode) and headaches (occassionally that he attributes to seasonal allergies).   Psychiatric/Behavioral:  Negative for altered mental status.        Objective      Vitals reviewed.   Constitutional:       Appearance: Not in distress.    Pulmonary:      Effort: Pulmonary effort is normal.      Breath sounds: Normal breath sounds.   Cardiovascular:      PMI at left midclavicular line. Normal rate. Regular rhythm. S1 with normal intensity. S2 with normal intensity.       Murmurs: There is no murmur.   Edema:     Peripheral edema absent.   Abdominal:      General: Bowel sounds are normal.   Skin:     General: Skin is warm and dry.   Psychiatric:         Attention and Perception: Attention normal.         Mood and Affect: Mood normal.         Behavior: Behavior is cooperative.         Current Outpatient Medications   Medication Instructions    atorvastatin (LIPITOR) 40 mg, oral, Nightly    empagliflozin (JARDIANCE) 25 mg, oral, Daily    glimepiride (AMARYL) 2 mg, oral, 2 times daily before meals    Lantus U-100 Insulin 44 Units, subcutaneous, Nightly    levothyroxine (SYNTHROID, LEVOXYL) 150 mcg, oral, Daily, as directed    lisinopril 2.5 mg, oral, Daily    pioglitazone (ACTOS) 45 mg, oral, Daily    sotalol (BETAPACE) 60 mg, oral, 2 times daily        Reviewed the following Labs:  CBC -  Lab Results   Component Value Date    WBC 6.8 05/13/2025    HGB 10.9 (L) 05/13/2025    HCT 34.5 (L) 05/13/2025    MCV 80 05/13/2025     05/13/2025       RENAL FUNCTION PANEL -   Lab Results   Component Value Date    GLUCOSE 261 (H) 06/03/2025     06/03/2025    K 4.2 06/03/2025     06/03/2025    CO2 27 06/03/2025    ANIONGAP 8 06/03/2025    BUN 24 06/03/2025    CREATININE 1.22 06/03/2025    GFRMALE 77 06/26/2023    CALCIUM 9.1 06/03/2025    PHOS 2.9 06/27/2024    ALBUMIN 4.2 06/03/2025        CMP -  Lab Results   Component Value Date    CALCIUM 9.1 06/03/2025    PHOS 2.9 06/27/2024    PROT 6.5 06/03/2025    ALBUMIN 4.2 06/03/2025    AST 16 06/03/2025    ALT 23 06/03/2025    ALKPHOS 84 06/03/2025    BILITOT 0.7 06/03/2025       LIPID PANEL -   Lab Results   Component Value Date    CHOL 111 05/31/2024    TRIG 137 05/31/2024    HDL 36.1 05/31/2024     "CHHDL 3.1 05/31/2024    LDLF 43 06/26/2023    VLDL 27 05/31/2024    NHDL 75 05/31/2024     Lab Results   Component Value Date    LDLCALC 48 05/31/2024       Lab Results   Component Value Date    BNP 51 07/07/2024    HGBA1C 12.1 (H) 06/03/2025    HGBA1C 8.2 (A) 12/04/2023       Lab Results   Component Value Date    TSH 3.16 06/03/2025       No results found for this or any previous visit.     Reviewed the following Cardiology Tests:    Echo 6/28/24:   1. The left ventricular systolic function is normal, with a Sauceda's biplane calculated ejection fraction of 62%.   2. Left ventricular diastolic filling was indeterminate.   3. There is normal right ventricular global systolic function.    Carotid artery duplex 8/30/22  Right Carotid: Findings are consistent with less than 50% stenosis of the right proximal ICA. Laminar flow seen by color Doppler. Right external carotid artery appears patent with no evidence of stenosis. No evidence of hemodynamically significant stenosis of the right common carotid artery. The right vertebral artery is patent with antegrade flow. No evidence of hemodynamically significant stenosis in the right subclavian.  Left Carotid: Findings are consistent with less than 50% stenosis of the left proximal ICA. Laminar flow seen by color Doppler. Left external carotid artery appears patent with no evidence of stenosis. No evidence of hemodynamically significant stenosis of the left common carotid artery. The left vertebral artery is patent with antegrade flow. No evidence of hemodynamically significant stenosis in the left subclavian.    Visit Vitals  /84   Pulse 65   Ht 1.887 m (6' 2.29\")   Wt 104 kg (229 lb 9.6 oz)   BMI 29.25 kg/m²   Smoking Status Never   BSA 2.33 m²       Assessment/Plan   The primary encounter diagnosis was AF (paroxysmal atrial fibrillation) (Multi). A diagnosis of Hypertension, unspecified type was also pertinent to this visit.    1. Paroxysmal atrial " fibrillation  Hypertension - Yes, 1 point, Diabetes - Yes, 1 point, and Age over 75 years - 2 points   [id number]-[id number] score is at least 4.   He is not on anticoagulation. Discussed [id number] score and recommendations for anticoagulation.  Patient declines anticoagulation. Would need to confirm with oncology that anticoagulation is okay prior to initiation.  Patient will let us know if he changes his mind.   Continue sotalol 60 mg BID     Saw Dr. Marsh 09/26/2022 who recommended ILR - insurance would not cover procedure     2. Dyslipidemia  Recommend Mediterranean style of eating  Goal LDL <70.  Currently at goal.  Continue atorvastatin 40 mg daily.   Check repeat lipid panel as ordered by endocrinology    3. DM type 2   Management per endocrinology    4. HTN   Stable  Continue current antihypertensives: lisinopril 2.5 mg daily     5. Metastatic neuroendocrine tumor to liver  Management per oncology    50 minutes spent on office visit today.     CLAUDETTE Horner-CNP          [1]   Family History  Family history unknown: Yes

## 2025-06-25 NOTE — PATIENT INSTRUCTIONS
Recommend Mediterranean style of eating  Continue current medications  Let us know if you change mind regarding anticoagulation/Eliquis to reduce your risk of stroke  Follow-up with Dr. Reynolds in 3 months  If you have any questions or cardiac concerns, please call our office at [phone].

## 2025-06-26 ENCOUNTER — OFFICE VISIT (OUTPATIENT)
Dept: PRIMARY CARE | Facility: CLINIC | Age: 75
End: 2025-06-26
Payer: MEDICARE

## 2025-06-26 ENCOUNTER — APPOINTMENT (OUTPATIENT)
Dept: PRIMARY CARE | Facility: CLINIC | Age: 75
End: 2025-06-26
Payer: MEDICARE

## 2025-06-26 VITALS
HEART RATE: 67 BPM | SYSTOLIC BLOOD PRESSURE: 168 MMHG | DIASTOLIC BLOOD PRESSURE: 78 MMHG | BODY MASS INDEX: 29.59 KG/M2 | WEIGHT: 230.6 LBS | HEIGHT: 74 IN | OXYGEN SATURATION: 98 %

## 2025-06-26 DIAGNOSIS — Z79.4 TYPE 2 DIABETES MELLITUS WITH HYPERGLYCEMIA, WITH LONG-TERM CURRENT USE OF INSULIN: ICD-10-CM

## 2025-06-26 DIAGNOSIS — Z00.00 ROUTINE GENERAL MEDICAL EXAMINATION AT HEALTH CARE FACILITY: ICD-10-CM

## 2025-06-26 DIAGNOSIS — R97.20 PSA ELEVATION: Chronic | ICD-10-CM

## 2025-06-26 DIAGNOSIS — I48.0 AF (PAROXYSMAL ATRIAL FIBRILLATION) (MULTI): Chronic | ICD-10-CM

## 2025-06-26 DIAGNOSIS — Z00.00 MEDICARE ANNUAL WELLNESS VISIT, SUBSEQUENT: Primary | ICD-10-CM

## 2025-06-26 DIAGNOSIS — I10 PRIMARY HYPERTENSION: ICD-10-CM

## 2025-06-26 DIAGNOSIS — E11.65 TYPE 2 DIABETES MELLITUS WITH HYPERGLYCEMIA, WITH LONG-TERM CURRENT USE OF INSULIN: ICD-10-CM

## 2025-06-26 DIAGNOSIS — R80.9 MICROALBUMINURIA: ICD-10-CM

## 2025-06-26 PROCEDURE — 1036F TOBACCO NON-USER: CPT

## 2025-06-26 PROCEDURE — 1159F MED LIST DOCD IN RCRD: CPT

## 2025-06-26 PROCEDURE — 3077F SYST BP >= 140 MM HG: CPT

## 2025-06-26 PROCEDURE — 1160F RVW MEDS BY RX/DR IN RCRD: CPT

## 2025-06-26 PROCEDURE — G0439 PPPS, SUBSEQ VISIT: HCPCS

## 2025-06-26 PROCEDURE — 3078F DIAST BP <80 MM HG: CPT

## 2025-06-26 PROCEDURE — 1170F FXNL STATUS ASSESSED: CPT

## 2025-06-26 RX ORDER — LISINOPRIL 2.5 MG/1
5 TABLET ORAL DAILY
Qty: 90 TABLET | Refills: 3 | Status: SHIPPED | OUTPATIENT
Start: 2025-06-26

## 2025-06-26 RX ORDER — LISINOPRIL 2.5 MG/1
5 TABLET ORAL DAILY
Qty: 90 TABLET | Refills: 1 | Status: SHIPPED | OUTPATIENT
Start: 2025-06-26 | End: 2025-06-26

## 2025-06-26 ASSESSMENT — ACTIVITIES OF DAILY LIVING (ADL)
BATHING: INDEPENDENT
TAKING_MEDICATION: INDEPENDENT
MANAGING_FINANCES: INDEPENDENT
GROCERY_SHOPPING: INDEPENDENT
DOING_HOUSEWORK: INDEPENDENT
DRESSING: INDEPENDENT

## 2025-06-26 ASSESSMENT — ENCOUNTER SYMPTOMS
DIARRHEA: 0
SHORTNESS OF BREATH: 0
LIGHT-HEADEDNESS: 0
DEPRESSION: 0
FREQUENCY: 0
CONSTIPATION: 0
BLOOD IN STOOL: 0
PALPITATIONS: 0
HEMATURIA: 0
DIZZINESS: 0
DYSURIA: 0
DIFFICULTY URINATING: 0
LOSS OF SENSATION IN FEET: 0
OCCASIONAL FEELINGS OF UNSTEADINESS: 0

## 2025-06-26 NOTE — ASSESSMENT & PLAN NOTE
"Lab Results   Component Value Date    HGBA1C 12.1 (H) 06/03/2025    HGBA1C 7.9 (H) 05/31/2024    HGBA1C 8.2 (A) 12/04/2023     -Following with Dr. Jamee gilmore  -on Lantus 44 units, Jardiance 25 mg, Amaryl, 2 mg twice a day and Actos 45 mg.  -is taking Lantus in the morning and not at night because it is easier to remember  -States he jumped on a candy wagon but now is watching that and modifying to get that down, watching his portion but still eats what he likes and not completely worried about it being up like that, will not go out of way to control that. States \" I will not follow baby food\"   -Has not been to Optho in some years for eye exam, report they only want his money and he does not need to see them, education provided on importance of annual diabetes eye exam  -Reports mando recently added glimepiride twice a day and increased Lantus to 44 units after his A1c went up  -Does not check his BG or BP at home, advised him to monitor at home  -Discussed the importance of making dietary and lifestyle changes, carb control diet and increasing daily activity     Orders:    Follow Up [facility] - PCP - Established    Follow Up In [facility] - PCP - Established; Future    "
-Declined referral to urology saying he does not have issue with his prostate, no trouble urinating, no blood in urine and that he has always had PSA fluctuate up and down & he is not worried, education provided         
-Following with cardiology  -On sotalol, continue  -Not on anticoagulation       
-on Sotalol and Lisinopril 2.5 mg   -BP elevated in office, recent BP in cardiology office was 146/84  -I increased his lisinopril to 5 mg and advised patient to monitor his BP at home and keep a log  -Monitor sodium intake  Orders:    lisinopril 2.5 mg tablet; Take 2 tablets (5 mg) by mouth once daily.    Follow Up In [facility] - PCP - Established; Future    
PNEUMONIA vaccine- Completed  SHINGLES vaccine- Recommend at pharmacy     Screening tests:  Colon cancer screening--> Not indicated  Prostate Cancer Screening--> elevated PSA, 5.56     During the course of the visit the patient was educated and counseled about age appropriate screening and preventive services. Completed preventive screenings were documented in the chart and orders were placed for outstanding screenings/procedures as documented in the Assessment and Plan.     Patient Instructions (the written plan) was given to the patient at check out that include any community based lifestyle interventions.     Other risk factors and conditions for which interventions are recommended are addressed as the other tagged diagnoses in this encounter.      
done

## 2025-06-26 NOTE — PROGRESS NOTES
"Subjective   Reason for Visit: Juan Varela is an 75 y.o. male here for a Medicare Wellness visit.     Past Medical, Surgical, and Family History reviewed and updated in chart.    Reviewed all medications by prescribing practitioner or clinical pharmacist (such as prescriptions, OTCs, herbal therapies and supplements) and documented in the medical record.    HPI  76 yo male presents for medicare wellness. Following with oncology for metastatic neuroendocrine tumor to liver.     Recent labs: PSA 5.56 (elevated for the past couple of years, never been to urology and is declining) , A1c 12.1, urine albumin normal.    His son and grandson are dependent on him.     Patient Care Team:  Roseanna Durbin DO as PCP - General (Family Medicine)  Genesis Mancuso MD as Endocrinologist (Endocrinology)  Rick Reynolds MD as Consulting Physician (Cardiology)  Juan David Turner MD as Consulting Physician (Hematology and Oncology)     Review of Systems   Respiratory:  Negative for shortness of breath.    Cardiovascular:  Positive for leg swelling. Negative for chest pain and palpitations.   Gastrointestinal:  Negative for blood in stool, constipation and diarrhea.   Genitourinary:  Negative for difficulty urinating, dysuria, frequency, hematuria and urgency.   Neurological:  Negative for dizziness and light-headedness.       Objective   Vitals:  /78   Pulse 67   Ht 1.887 m (6' 2.29\")   Wt 105 kg (230 lb 9.6 oz)   SpO2 98%   BMI 29.38 kg/m²       Physical Exam  Constitutional:       Appearance: Normal appearance.   HENT:      Head: Normocephalic.   Cardiovascular:      Rate and Rhythm: Normal rate and regular rhythm.      Pulses: Normal pulses.   Pulmonary:      Effort: Pulmonary effort is normal.      Breath sounds: Normal breath sounds.   Abdominal:      General: Bowel sounds are normal.      Palpations: Abdomen is soft.   Musculoskeletal:      Right lower leg: Edema present.      Left lower leg: Edema present. "   Skin:     General: Skin is warm and dry.   Neurological:      Mental Status: He is alert and oriented to person, place, and time.   Psychiatric:         Mood and Affect: Mood normal.         Behavior: Behavior normal.         Assessment & Plan  Medicare annual wellness visit, subsequent  PNEUMONIA vaccine- Completed  SHINGLES vaccine- Recommend at pharmacy     Screening tests:  Colon cancer screening--> Not indicated  Prostate Cancer Screening--> elevated PSA, 5.56     During the course of the visit the patient was educated and counseled about age appropriate screening and preventive services. Completed preventive screenings were documented in the chart and orders were placed for outstanding screenings/procedures as documented in the Assessment and Plan.     Patient Instructions (the written plan) was given to the patient at check out that include any community based lifestyle interventions.     Other risk factors and conditions for which interventions are recommended are addressed as the other tagged diagnoses in this encounter.      PSA elevation  -Declined referral to urology saying he does not have issue with his prostate, no trouble urinating, no blood in urine and that he has always had PSA fluctuate up and down & he is not worried, education provided         Primary hypertension  -on Sotalol and Lisinopril 2.5 mg   -BP elevated in office, recent BP in cardiology office was 146/84  -I increased his lisinopril to 5 mg and advised patient to monitor his BP at home and keep a log  -Monitor sodium intake  Orders:    lisinopril 2.5 mg tablet; Take 2 tablets (5 mg) by mouth once daily.    Follow Up In [facility] - PCP - Established; Future    Type 2 diabetes mellitus with hyperglycemia, with long-term current use of insulin  Lab Results   Component Value Date    HGBA1C 12.1 (H) 06/03/2025    HGBA1C 7.9 (H) 05/31/2024    HGBA1C 8.2 (A) 12/04/2023     -Following with Dr. Jamee gilmore  -on Lantus 44 units,  "Jardiance 25 mg, Amaryl, 2 mg twice a day and Actos 45 mg.  -is taking Lantus in the morning and not at night because it is easier to remember  -States he jumped on a candy wagon but now is watching that and modifying to get that down, watching his portion but still eats what he likes and not completely worried about it being up like that, will not go out of way to control that. States \" I will not follow baby food\"   -Has not been to Optho in some years for eye exam, report they only want his money and he does not need to see them, education provided on importance of annual diabetes eye exam  -Reports endo recently added glimepiride twice a day and increased Lantus to 44 units after his A1c went up  -Does not check his BG or BP at home, advised him to monitor at home  -Discussed the importance of making dietary and lifestyle changes, carb control diet and increasing daily activity     Orders:    Follow [facility] - PCP - Established    Follow Up [facility] - PCP - Established; Future    Routine general medical examination at health care facility    Orders:    1 Year Follow Up In [facility] - PCP - Wellness Exam; Future    AF (paroxysmal atrial fibrillation) (Multi)  -Following with cardiology  -On sotalol, continue  -Not on anticoagulation       Microalbuminuria  -on ACE, SGLT-2  -A1c poorly controlled, BP elevated   -Educated about blood sugar and BP control, avoid nephrotoxic agents, hydrate with non-sugar drinks  Orders:    lisinopril 2.5 mg tablet; Take 2 tablets (5 mg) by mouth once daily.              "

## 2025-07-08 ENCOUNTER — OFFICE VISIT (OUTPATIENT)
Dept: HEMATOLOGY/ONCOLOGY | Facility: CLINIC | Age: 75
End: 2025-07-08
Payer: MEDICARE

## 2025-07-08 ENCOUNTER — INFUSION (OUTPATIENT)
Dept: HEMATOLOGY/ONCOLOGY | Facility: CLINIC | Age: 75
End: 2025-07-08
Payer: MEDICARE

## 2025-07-08 VITALS
SYSTOLIC BLOOD PRESSURE: 144 MMHG | OXYGEN SATURATION: 100 % | RESPIRATION RATE: 16 BRPM | HEIGHT: 74 IN | TEMPERATURE: 97 F | DIASTOLIC BLOOD PRESSURE: 78 MMHG | WEIGHT: 227.74 LBS | BODY MASS INDEX: 29.23 KG/M2 | HEART RATE: 81 BPM

## 2025-07-08 DIAGNOSIS — D3A.8 NEUROENDOCRINE TUMOR: ICD-10-CM

## 2025-07-08 DIAGNOSIS — K76.9 LIVER LESION: ICD-10-CM

## 2025-07-08 DIAGNOSIS — N40.0 PROSTATE ENLARGEMENT: ICD-10-CM

## 2025-07-08 DIAGNOSIS — R16.0 LIVER MASS: ICD-10-CM

## 2025-07-08 LAB
ALBUMIN SERPL BCP-MCNC: 4.1 G/DL (ref 3.4–5)
ALP SERPL-CCNC: 70 U/L (ref 33–136)
ALT SERPL W P-5'-P-CCNC: 24 U/L (ref 10–52)
ANION GAP SERPL CALC-SCNC: 12 MMOL/L (ref 10–20)
AST SERPL W P-5'-P-CCNC: 18 U/L (ref 9–39)
BASOPHILS # BLD AUTO: 0.04 X10*3/UL (ref 0–0.1)
BASOPHILS NFR BLD AUTO: 0.6 %
BILIRUB SERPL-MCNC: 0.7 MG/DL (ref 0–1.2)
BUN SERPL-MCNC: 23 MG/DL (ref 6–23)
CALCIUM SERPL-MCNC: 8.6 MG/DL (ref 8.6–10.3)
CHLORIDE SERPL-SCNC: 104 MMOL/L (ref 98–107)
CO2 SERPL-SCNC: 26 MMOL/L (ref 21–32)
CREAT SERPL-MCNC: 1.18 MG/DL (ref 0.5–1.3)
EGFRCR SERPLBLD CKD-EPI 2021: 64 ML/MIN/1.73M*2
EOSINOPHIL # BLD AUTO: 0.23 X10*3/UL (ref 0–0.4)
EOSINOPHIL NFR BLD AUTO: 3.5 %
ERYTHROCYTE [DISTWIDTH] IN BLOOD BY AUTOMATED COUNT: 14.1 % (ref 11.5–14.5)
GLUCOSE SERPL-MCNC: 219 MG/DL (ref 74–99)
HCT VFR BLD AUTO: 34.7 % (ref 41–52)
HGB BLD-MCNC: 11 G/DL (ref 13.5–17.5)
IMM GRANULOCYTES # BLD AUTO: 0.01 X10*3/UL (ref 0–0.5)
IMM GRANULOCYTES NFR BLD AUTO: 0.2 % (ref 0–0.9)
LYMPHOCYTES # BLD AUTO: 1.3 X10*3/UL (ref 0.8–3)
LYMPHOCYTES NFR BLD AUTO: 19.5 %
MCH RBC QN AUTO: 25.2 PG (ref 26–34)
MCHC RBC AUTO-ENTMCNC: 31.7 G/DL (ref 32–36)
MCV RBC AUTO: 80 FL (ref 80–100)
MONOCYTES # BLD AUTO: 0.45 X10*3/UL (ref 0.05–0.8)
MONOCYTES NFR BLD AUTO: 6.8 %
NEUTROPHILS # BLD AUTO: 4.62 X10*3/UL (ref 1.6–5.5)
NEUTROPHILS NFR BLD AUTO: 69.4 %
PLATELET # BLD AUTO: 175 X10*3/UL (ref 150–450)
POTASSIUM SERPL-SCNC: 4.2 MMOL/L (ref 3.5–5.3)
PROT SERPL-MCNC: 6.7 G/DL (ref 6.4–8.2)
RBC # BLD AUTO: 4.36 X10*6/UL (ref 4.5–5.9)
SODIUM SERPL-SCNC: 138 MMOL/L (ref 136–145)
WBC # BLD AUTO: 6.7 X10*3/UL (ref 4.4–11.3)

## 2025-07-08 PROCEDURE — 36415 COLL VENOUS BLD VENIPUNCTURE: CPT | Performed by: INTERNAL MEDICINE

## 2025-07-08 PROCEDURE — 1160F RVW MEDS BY RX/DR IN RCRD: CPT | Performed by: INTERNAL MEDICINE

## 2025-07-08 PROCEDURE — 3078F DIAST BP <80 MM HG: CPT | Performed by: INTERNAL MEDICINE

## 2025-07-08 PROCEDURE — 96372 THER/PROPH/DIAG INJ SC/IM: CPT

## 2025-07-08 PROCEDURE — 2500000004 HC RX 250 GENERAL PHARMACY W/ HCPCS (ALT 636 FOR OP/ED): Mod: JZ,TB | Performed by: INTERNAL MEDICINE

## 2025-07-08 PROCEDURE — 85025 COMPLETE CBC W/AUTO DIFF WBC: CPT | Performed by: INTERNAL MEDICINE

## 2025-07-08 PROCEDURE — 1126F AMNT PAIN NOTED NONE PRSNT: CPT | Performed by: INTERNAL MEDICINE

## 2025-07-08 PROCEDURE — 99213 OFFICE O/P EST LOW 20 MIN: CPT | Mod: 25 | Performed by: INTERNAL MEDICINE

## 2025-07-08 PROCEDURE — 80053 COMPREHEN METABOLIC PANEL: CPT | Performed by: INTERNAL MEDICINE

## 2025-07-08 PROCEDURE — 1159F MED LIST DOCD IN RCRD: CPT | Performed by: INTERNAL MEDICINE

## 2025-07-08 PROCEDURE — 99213 OFFICE O/P EST LOW 20 MIN: CPT | Performed by: INTERNAL MEDICINE

## 2025-07-08 PROCEDURE — 86316 IMMUNOASSAY TUMOR OTHER: CPT | Performed by: INTERNAL MEDICINE

## 2025-07-08 PROCEDURE — 3077F SYST BP >= 140 MM HG: CPT | Performed by: INTERNAL MEDICINE

## 2025-07-08 RX ORDER — LANREOTIDE ACETATE 120 MG/.5ML
120 INJECTION SUBCUTANEOUS ONCE
Status: COMPLETED | OUTPATIENT
Start: 2025-07-08 | End: 2025-07-08

## 2025-07-08 RX ORDER — ALBUTEROL SULFATE 0.83 MG/ML
3 SOLUTION RESPIRATORY (INHALATION) AS NEEDED
OUTPATIENT
Start: 2025-08-05

## 2025-07-08 RX ORDER — FAMOTIDINE 10 MG/ML
20 INJECTION, SOLUTION INTRAVENOUS ONCE AS NEEDED
OUTPATIENT
Start: 2025-08-05

## 2025-07-08 RX ORDER — LANREOTIDE ACETATE 120 MG/.5ML
120 INJECTION SUBCUTANEOUS ONCE
OUTPATIENT
Start: 2025-08-05

## 2025-07-08 RX ORDER — DIPHENHYDRAMINE HYDROCHLORIDE 50 MG/ML
50 INJECTION, SOLUTION INTRAMUSCULAR; INTRAVENOUS AS NEEDED
OUTPATIENT
Start: 2025-08-05

## 2025-07-08 RX ORDER — EPINEPHRINE 0.3 MG/.3ML
0.3 INJECTION SUBCUTANEOUS EVERY 5 MIN PRN
OUTPATIENT
Start: 2025-08-05

## 2025-07-08 RX ADMIN — LANREOTIDE ACETATE 120 MG: 120 INJECTION SUBCUTANEOUS at 15:15

## 2025-07-08 ASSESSMENT — PAIN SCALES - GENERAL: PAINLEVEL_OUTOF10: 0-NO PAIN

## 2025-07-08 NOTE — PROGRESS NOTES
Pt arrived awake, alert, oriented, no apparent distress with unlabored breaths. Pt reports feeling well today without s/sx of acute illness. Pt here for his Q 28 day lanreotide injection, in which he received and tolerated well as of thus far. Pt with next appointment set for 8/5/25, no further questions or concerns, discharged in stable condition.

## 2025-07-08 NOTE — PROGRESS NOTES
Patient ID: Juan Varela is a 75 y.o. male.  Referring Physician: Juan David Turner MD  [address] [facility], [address]  [place] [id number]  Primary Care Provider: Roseanna Durbin DO    Oncology history  #1 metastatic neuroendocrine tumor  Presented with syncope, CAT scan of chest abdomen pelvis did show rectal wall thickening and ill-defined liver lesions  8/20/24 , colonoscopy normal no any abnormal pathology  7/26/24 , MRI of liver,Multiple (approximately 6) bilobar enhancing liver lesions, concerning for metastases.  9/9/24  , liver biopsy, well-differentiated neuroendocrine tumor involving liver, WHO grade 2 , Immunostains show that the tumor cells are positive for CKAE1/3, synaptophysin, chromogranin and INSM1, while negative for GATA3, NKX3.1, TTF-1, CK7, and CK20. The immunoprofile is consistent with a neuroendocrine neoplasm.   The ki-67 index is 3.6%   10/03/24 , NETSPOT 1.  Multiple Dotatate avid hypodense hepatic masses compatible with metastatic neuroendocrine malignancy. 2. An enlarged Dotatate avid small-bowel mesenteric lymph node iscompatible with katerina malignancy of neuroendocrine origin.  3. No definite Dotatate avid lesion within the small bowel, pancreas, or elsewhere within the abdomen and pelvis to suggest the site of primary malignancy.  Lanreotide started on 10/31/24  10/17/24 , chromogranin A, 843  1/15/25 , chromogranin A, 455  4/08/25 , MRI of liver multiple hepatic lesion no change in size, no new lesion as compared to PET scan done in October 2024 (dotatate avid hepatic lesion)  4/15/25 , chromogranin A, 367    Subjective    HPI   Juan Varela is a 74 y.o. male with PMH of afib, DM2, HTN, afib, and hypothyroidism who was   seen in ER 7/7/24 for near syncope. While in ER, he reported abdominal pain. CT A/P noted diffuse rectal wall thickening and multifocal ill-defined liver lesions c/f metastasis. He was discharged with follow up recommendations to oncology and  GI.    GI evaluation done, 8/20/24 , colonoscopy normal no any abnormal pathology  7/26/24 , MRI of liver,Multiple (approximately 6) bilobar enhancing liver lesions,  concerning for metastases.    Patient is doing very well patient denies any headache no dizziness no nausea vomiting, no chest pain, no shortness of breath, nonspecific abdominal pain which happened couple of time, no diarrhea and constipation no rectal bleed something patient loose bowel movement, no dysuria and hematuria, history of weight loss now body weight is stable     He was hospitalized in June 2024 for afib, hypoglycemia, and near syncope. He is currently on sotolol. His most recent EKG 7/7/24 showed NSR. Qtc has returned to normal.       Interval history  7/8/25  CAT scan of chest abdominal pelvis did show multiple hepatic lesion which were confirmed by MRI.  Liver biopsy done pathology positive for well-differentiated neuroendocrine tumor.     No chest pain no shortness of breath, no skin rash, no diarrhea, no hot flashes patient is asymptomatic.  NETSPOT, positive for hepatic lesions and small mesenteric lymphadenopathy, no abnormal activity seen in the small intestine and colon, lungs, rectum.  Lanreotide was started on October 31, 2024 which is well-tolerated.  No nausea vomiting no abdominal pain patient has 1 bowel movement every day  MRI liver was stable, chromogranin level has been stable actually decreasing  Patient is feeling better no nausea vomiting no abdominal pain, no diarrhea    Review of Systems:  Review of Systems   Constitutional:  Negative for chills and fever.   HENT:  Negative for sore throat and trouble swallowing.    Respiratory:  Negative for cough and shortness of breath.    Cardiovascular:  Negative for chest pain and palpitations.   Gastrointestinal:       Endocrine: Negative for cold intolerance and heat intolerance.   Genitourinary:  Negative for difficulty urinating.   Musculoskeletal:  Negative for arthralgias  and joint swelling.   Skin:  Negative for rash and wound.   Neurological:  Negative for dizziness and weakness.   Hematological:  Negative for adenopathy. Does not bruise/bleed easily.   Psychiatric/Behavioral:  Negative for confusion.          Medications:          Prior to Admission medications    Medication Sig Start Date End Date Taking? Authorizing Provider   atorvastatin (Lipitor) 40 mg tablet TAKE 1 TABLET BY MOUTH EVERYDAY AT BEDTIME 3/8/24     Genesis Mancuso MD   empagliflozin (Jardiance) 25 mg Take 1 tablet (25 mg) by mouth once daily. 6/4/24 12/1/24   Genesis Mancuso MD   insulin glargine (Lantus) 100 unit/mL injection Inject 40 Units under the skin once daily in the morning. Take before meals. Take as directed per insulin instructions. Do not fill before June 29, 2024. 6/29/24     Rd Esquivel,    levothyroxine (Synthroid, Levoxyl) 150 mcg tablet TAKE 1 TABLET BY MOUTH EVERY DAY AS DIRECTED 5/24/24     Genesis Mancuso MD   lisinopril 2.5 mg tablet TAKE 1 TABLET BY MOUTH ONCE DAILY. 4/26/24     Genesis Mancuso MD   pioglitazone (Actos) 45 mg tablet TAKE 1 TABLET (45 MG) BY MOUTH ONCE DAILY 5/24/24 11/20/24   Genesis Mancuso MD   sotalol (Betapace) 120 mg tablet TAKE 1/2 TABLET (60 MG) BY MOUTH 2 TIMES A DAY. 3/6/24     Chet Ley PA-C         Allergies:  Patient has no known allergies.     Past Medical History:  He has a past medical history of Atrial fibrillation (Multi), Diabetes mellitus (Multi), and Hypertension.     Past Surgical History:  He has a past surgical history that includes Other surgical history (09/02/2021).     Social History:  He reports that he has never smoked. He has never been exposed to tobacco smoke. He has never used smokeless tobacco. He reports that he does not drink alcohol and does not use drugs.        Review of Systems - Oncology   Review of Systems:  Review of Systems   Constitutional:  Negative for  chills and fever.   HENT:  Negative for sore throat and trouble swallowing.    Respiratory:  Negative for cough and shortness of breath.    Cardiovascular:  Negative for chest pain and palpitations.   Gastrointestinal:       Endocrine: Negative for cold intolerance and heat intolerance.   Genitourinary:  Negative for difficulty urinating.   Musculoskeletal:  Negative for arthralgias and joint swelling.   Skin:  Negative for rash and wound.   Neurological:  Negative for dizziness and weakness.   Hematological:  Negative for adenopathy. Does not bruise/bleed easily.   Psychiatric/Behavioral:  Negative for confusion.      Medications:          Prior to Admission medications    Medication Sig Start Date End Date Taking? Authorizing Provider   atorvastatin (Lipitor) 40 mg tablet TAKE 1 TABLET BY MOUTH EVERYDAY AT BEDTIME 3/8/24     Genesis Mancuso MD   empagliflozin (Jardiance) 25 mg Take 1 tablet (25 mg) by mouth once daily. 6/4/24 12/1/24   Genesis Mancuso MD   insulin glargine (Lantus) 100 unit/mL injection Inject 40 Units under the skin once daily in the morning. Take before meals. Take as directed per insulin instructions. Do not fill before June 29, 2024. 6/29/24     Rd Esquivel,    levothyroxine (Synthroid, Levoxyl) 150 mcg tablet TAKE 1 TABLET BY MOUTH EVERY DAY AS DIRECTED 5/24/24     Genesis Mancuso MD   lisinopril 2.5 mg tablet TAKE 1 TABLET BY MOUTH ONCE DAILY. 4/26/24     Genesis Mancuso MD   pioglitazone (Actos) 45 mg tablet TAKE 1 TABLET (45 MG) BY MOUTH ONCE DAILY 5/24/24 11/20/24   Genesis Mancuso MD   sotalol (Betapace) 120 mg tablet TAKE 1/2 TABLET (60 MG) BY MOUTH 2 TIMES A DAY. 3/6/24     Chet Ley PA-C      Allergies:  Patient has no known allergies.      Family History   Family history unknown: Yes     Juan Varela  reports that he has never smoked. He has never been exposed to tobacco smoke. He has never used smokeless  "tobacco.  He  reports no history of alcohol use.  He  reports no history of drug use.    Objective   BSA: 2.32 meters squared  /77 (BP Location: Left arm, Patient Position: Sitting)   Pulse 79   Temp 36.7 °C (98.1 °F) (Temporal)   Resp 16   Ht (S) 1.887 m (6' 2.29\")   Wt 103 kg (227 lb 15.3 oz)   SpO2 98%   BMI 29.04 kg/m²     Physical Exam  Vitals are stable    HEENT examination normal limit    Neck is supple, no carotid bruit, no thyromegaly no cervical lymphadenopathy    Lungs clear on the both side no wheezing    Heart with normal regular rhythm    Abdomen is soft, nontender no hepatosplenomegaly, no any other masses noted, normotonic bowel sound    Extremity patient with trace edema left lower extremity    Neuro examination nonfocal    Lymphatic no peripheral lymphadenopathy    Skin examination did show dry skin no rash no pigmented lesion    Labs          Chromogranin A  Order: [id number]   Status: Final result          Test Result Released: No (inaccessible in [facility])    0 Result Notes        Component  Ref Range & Units 2 mo ago 5 mo ago 8 mo ago   CHROMOGRANIN A,LC/MS/MS  ADULTS: <311 ng/mL 367 High  455 High   High  CM        Radiology report  CT abdominal pelvis,Multifocal ill-defined lesions within the liver. Findings are  concerning for malignancy/metastasis. Dedicated contrast-enhanced  liver MRI is suggested for further evaluation.      Diffuse rectal wall thickening noted which could be related to  proctitis however correlation for neoplasia is suggested. Inspissated  stool within the rectum with mild inflammatory changes of the distal  colon noted. Correlation for proctocolitis also recommended    MRI of liver,Multiple (approximately 6) bilobar enhancing liver lesions,  concerning for metastases.   4/08/25 , MRI of liver,All of the Dotatate avid liver lesions noted on PET-CT 3 October 2024  (the most recent comparison exam of any kind) are known previously  existing metastases " based on MRI 26 July 2024 (the most recent MRI  for comparison)      Comparing MRI with MRI, today's exam shows no interval change in size  of any of the enhancing hepatic metastases  Pathology  FINAL DIAGNOSIS   A.  Liver, mass, biopsy:  -Well-differentiated neuroendocrine tumor involving the liver, WHO grade 2 (see comment).     Comment:  Immunostains show that the tumor cells are positive for CKAE1/3, synaptophysin, chromogranin and INSM1, while negative for GATA3, NKX3.1, TTF-1, CK7, and CK20.  The immunoprofile is consistent with a neuroendocrine neoplasm. The ki-67 index is 3.6%   NETSPOT  1.  Multiple Dotatate avid hypodense hepatic masses compatible with  metastatic neuroendocrine malignancy.  2. An enlarged Dotatate avid small-bowel mesenteric lymph node is  compatible with katerina malignancy of neuroendocrine origin.  3. No definite Dotatate avid lesion within the small bowel, pancreas,  or elsewhere within the abdomen and pelvis to suggest the site of  primary malignancy.  Assessment/Plan      ##1 metastatic neuroendocrine tumor  Presented with syncope, CAT scan of chest abdomen pelvis did show rectal wall thickening and ill-defined liver lesions  8/20/24 , colonoscopy normal no any abnormal pathology  7/26/24 , MRI of liver,Multiple (approximately 6) bilobar enhancing liver lesions, concerning for metastases.  9/9/24 , liver biopsy, well-differentiated neuroendocrine tumor involving liver, WHO grade 2       lanreotide every 28 on 10/31/24  10/17/24 , chromogranin A, 843  1/15/25 , chromogranin A, 455    4/08/25 , MRI of liver multiple hepatic lesion no change in size, no new lesion as compared to PET scan done in October 2024 (dotatate avid hepatic lesion)    Plan , 7/8/25  Patient not interested for redo ablative therapy.  Will continue lanreotide, monitor chromogranin level.  MRI result discussed with the patient patient has stable hepatic lesion no evidence of new hepatic lesion.    Continue same  treatment reevaluation after 3-month    time spent 20 minutes  Juan David Turner MD

## 2025-07-08 NOTE — PATIENT INSTRUCTIONS
Follow up visit for history of neuroendocrine tumor.     Continue lanreotide monthly.     Follow up with Dr. Turner in 3 months.

## 2025-07-15 ENCOUNTER — APPOINTMENT (OUTPATIENT)
Dept: HEMATOLOGY/ONCOLOGY | Facility: CLINIC | Age: 75
End: 2025-07-15
Payer: MEDICARE

## 2025-07-17 LAB — CHROMOGRANIN A, LC/MS/MS: 354 NG/ML

## 2025-08-05 ENCOUNTER — APPOINTMENT (OUTPATIENT)
Dept: HEMATOLOGY/ONCOLOGY | Facility: CLINIC | Age: 75
End: 2025-08-05
Payer: MEDICARE

## 2025-08-05 ENCOUNTER — INFUSION (OUTPATIENT)
Dept: HEMATOLOGY/ONCOLOGY | Facility: CLINIC | Age: 75
End: 2025-08-05
Payer: MEDICARE

## 2025-08-05 VITALS
WEIGHT: 234 LBS | OXYGEN SATURATION: 100 % | DIASTOLIC BLOOD PRESSURE: 69 MMHG | BODY MASS INDEX: 30.03 KG/M2 | HEIGHT: 74 IN | RESPIRATION RATE: 16 BRPM | SYSTOLIC BLOOD PRESSURE: 129 MMHG | TEMPERATURE: 97.2 F | HEART RATE: 68 BPM

## 2025-08-05 DIAGNOSIS — D3A.8 NEUROENDOCRINE TUMOR: ICD-10-CM

## 2025-08-05 PROCEDURE — 96372 THER/PROPH/DIAG INJ SC/IM: CPT

## 2025-08-05 PROCEDURE — 2500000004 HC RX 250 GENERAL PHARMACY W/ HCPCS (ALT 636 FOR OP/ED): Mod: JZ,TB | Performed by: INTERNAL MEDICINE

## 2025-08-05 RX ORDER — FAMOTIDINE 10 MG/ML
20 INJECTION, SOLUTION INTRAVENOUS ONCE AS NEEDED
OUTPATIENT
Start: 2025-09-02

## 2025-08-05 RX ORDER — EPINEPHRINE 0.3 MG/.3ML
0.3 INJECTION SUBCUTANEOUS EVERY 5 MIN PRN
OUTPATIENT
Start: 2025-09-02

## 2025-08-05 RX ORDER — LANREOTIDE ACETATE 120 MG/.5ML
120 INJECTION SUBCUTANEOUS ONCE
OUTPATIENT
Start: 2025-09-02

## 2025-08-05 RX ORDER — LANREOTIDE ACETATE 120 MG/.5ML
120 INJECTION SUBCUTANEOUS ONCE
Status: COMPLETED | OUTPATIENT
Start: 2025-08-05 | End: 2025-08-05

## 2025-08-05 RX ORDER — DIPHENHYDRAMINE HYDROCHLORIDE 50 MG/ML
50 INJECTION, SOLUTION INTRAMUSCULAR; INTRAVENOUS AS NEEDED
OUTPATIENT
Start: 2025-09-02

## 2025-08-05 RX ORDER — ALBUTEROL SULFATE 0.83 MG/ML
3 SOLUTION RESPIRATORY (INHALATION) AS NEEDED
OUTPATIENT
Start: 2025-09-02

## 2025-08-05 RX ADMIN — LANREOTIDE ACETATE 120 MG: 120 INJECTION SUBCUTANEOUS at 14:13

## 2025-08-05 ASSESSMENT — PAIN SCALES - GENERAL: PAINLEVEL_OUTOF10: 0-NO PAIN

## 2025-08-05 NOTE — PROGRESS NOTES
Patient here for Lanreotide injection, tolerated well. Patient to return 9/2 for injection. Denies any questions at this time. Discharged in stable condition.

## 2025-09-02 ENCOUNTER — INFUSION (OUTPATIENT)
Dept: HEMATOLOGY/ONCOLOGY | Facility: CLINIC | Age: 75
End: 2025-09-02
Payer: MEDICARE

## 2025-09-02 VITALS
RESPIRATION RATE: 16 BRPM | DIASTOLIC BLOOD PRESSURE: 79 MMHG | HEART RATE: 68 BPM | SYSTOLIC BLOOD PRESSURE: 134 MMHG | BODY MASS INDEX: 29.56 KG/M2 | HEIGHT: 74 IN | TEMPERATURE: 97.5 F | OXYGEN SATURATION: 100 % | WEIGHT: 230.3 LBS

## 2025-09-02 DIAGNOSIS — D3A.8 NEUROENDOCRINE TUMOR: ICD-10-CM

## 2025-09-02 PROCEDURE — 96372 THER/PROPH/DIAG INJ SC/IM: CPT

## 2025-09-02 PROCEDURE — 2500000004 HC RX 250 GENERAL PHARMACY W/ HCPCS (ALT 636 FOR OP/ED): Mod: JZ,TB | Performed by: INTERNAL MEDICINE

## 2025-09-02 RX ORDER — LANREOTIDE ACETATE 120 MG/.5ML
120 INJECTION SUBCUTANEOUS ONCE
OUTPATIENT
Start: 2025-09-30

## 2025-09-02 RX ORDER — DIPHENHYDRAMINE HYDROCHLORIDE 50 MG/ML
50 INJECTION, SOLUTION INTRAMUSCULAR; INTRAVENOUS AS NEEDED
OUTPATIENT
Start: 2025-09-30

## 2025-09-02 RX ORDER — LANREOTIDE ACETATE 120 MG/.5ML
120 INJECTION SUBCUTANEOUS ONCE
Status: COMPLETED | OUTPATIENT
Start: 2025-09-02 | End: 2025-09-02

## 2025-09-02 RX ORDER — FAMOTIDINE 10 MG/ML
20 INJECTION, SOLUTION INTRAVENOUS ONCE AS NEEDED
OUTPATIENT
Start: 2025-09-30

## 2025-09-02 RX ORDER — EPINEPHRINE 0.3 MG/.3ML
0.3 INJECTION SUBCUTANEOUS EVERY 5 MIN PRN
OUTPATIENT
Start: 2025-09-30

## 2025-09-02 RX ORDER — ALBUTEROL SULFATE 0.83 MG/ML
3 SOLUTION RESPIRATORY (INHALATION) AS NEEDED
OUTPATIENT
Start: 2025-09-30

## 2025-09-02 RX ADMIN — LANREOTIDE ACETATE 120 MG: 120 INJECTION SUBCUTANEOUS at 15:24

## 2025-09-02 ASSESSMENT — PAIN SCALES - GENERAL: PAINLEVEL_OUTOF10: 0-NO PAIN

## 2025-09-22 ENCOUNTER — APPOINTMENT (OUTPATIENT)
Dept: PRIMARY CARE | Facility: CLINIC | Age: 75
End: 2025-09-22
Payer: MEDICARE

## 2025-12-02 ENCOUNTER — APPOINTMENT (OUTPATIENT)
Dept: ENDOCRINOLOGY | Facility: CLINIC | Age: 75
End: 2025-12-02
Payer: MEDICARE

## 2026-06-25 ENCOUNTER — APPOINTMENT (OUTPATIENT)
Dept: PRIMARY CARE | Facility: CLINIC | Age: 76
End: 2026-06-25
Payer: MEDICARE